# Patient Record
Sex: FEMALE | Race: WHITE | NOT HISPANIC OR LATINO | Employment: OTHER | URBAN - METROPOLITAN AREA
[De-identification: names, ages, dates, MRNs, and addresses within clinical notes are randomized per-mention and may not be internally consistent; named-entity substitution may affect disease eponyms.]

---
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
## 2022-06-16 ENCOUNTER — HOSPITAL ENCOUNTER (EMERGENCY)
Facility: HOSPITAL | Age: 22
Discharge: HOME | End: 2022-06-16
Attending: STUDENT IN AN ORGANIZED HEALTH CARE EDUCATION/TRAINING PROGRAM
Payer: MEDICAID

## 2022-06-16 VITALS
SYSTOLIC BLOOD PRESSURE: 141 MMHG | TEMPERATURE: 97 F | RESPIRATION RATE: 16 BRPM | DIASTOLIC BLOOD PRESSURE: 65 MMHG | HEART RATE: 82 BPM | OXYGEN SATURATION: 99 %

## 2022-06-16 DIAGNOSIS — R56.9 SEIZURE: ICD-10-CM

## 2022-06-16 DIAGNOSIS — Z34.90 PREGNANCY, UNSPECIFIED GESTATIONAL AGE: Primary | ICD-10-CM

## 2022-06-16 LAB
ALBUMIN SERPL-MCNC: 4.2 GM/DL (ref 3.5–5)
ALBUMIN/GLOB SERPL: 1.1 RATIO (ref 1.1–2)
ALP SERPL-CCNC: 103 UNIT/L (ref 40–150)
ALT SERPL-CCNC: 21 UNIT/L (ref 0–55)
AMPHET UR QL SCN: NEGATIVE
APPEARANCE UR: CLEAR
AST SERPL-CCNC: 20 UNIT/L (ref 5–34)
B-HCG SERPL QL: POSITIVE
BACTERIA #/AREA URNS AUTO: ABNORMAL /HPF
BARBITURATE SCN PRESENT UR: NEGATIVE
BASOPHILS # BLD AUTO: 0.08 X10(3)/MCL (ref 0–0.2)
BASOPHILS NFR BLD AUTO: 0.9 %
BENZODIAZ UR QL SCN: NEGATIVE
BILIRUB UR QL STRIP.AUTO: NEGATIVE MG/DL
BILIRUBIN DIRECT+TOT PNL SERPL-MCNC: 0.6 MG/DL
BUN SERPL-MCNC: 8 MG/DL (ref 7–18.7)
CALCIUM SERPL-MCNC: 9.5 MG/DL (ref 8.4–10.2)
CANNABINOIDS UR QL SCN: NEGATIVE
CHLORIDE SERPL-SCNC: 105 MMOL/L (ref 98–107)
CO2 SERPL-SCNC: 21 MMOL/L (ref 22–29)
COCAINE UR QL SCN: NEGATIVE
COLOR UR AUTO: YELLOW
CREAT SERPL-MCNC: 0.71 MG/DL (ref 0.55–1.02)
EOSINOPHIL # BLD AUTO: 0 X10(3)/MCL (ref 0–0.9)
EOSINOPHIL NFR BLD AUTO: 0 %
ERYTHROCYTE [DISTWIDTH] IN BLOOD BY AUTOMATED COUNT: 17.3 % (ref 11.5–17)
FENTANYL UR QL SCN: NEGATIVE
GLOBULIN SER-MCNC: 3.8 GM/DL (ref 2.4–3.5)
GLUCOSE SERPL-MCNC: 125 MG/DL (ref 74–100)
GLUCOSE UR QL STRIP.AUTO: NEGATIVE MG/DL
HCT VFR BLD AUTO: 31 % (ref 37–47)
HGB BLD-MCNC: 9.4 GM/DL (ref 12–16)
HYALINE CASTS URNS QL MICRO: ABNORMAL /HPF
IMM GRANULOCYTES # BLD AUTO: 0.01 X10(3)/MCL (ref 0–0.02)
IMM GRANULOCYTES NFR BLD AUTO: 0.1 % (ref 0–0.43)
KETONES UR QL STRIP.AUTO: ABNORMAL MG/DL
LEUKOCYTE ESTERASE UR QL STRIP.AUTO: NEGATIVE UNIT/L
LYMPHOCYTES # BLD AUTO: 2.67 X10(3)/MCL (ref 0.6–4.6)
LYMPHOCYTES NFR BLD AUTO: 29.9 %
MAGNESIUM SERPL-MCNC: 1.8 MG/DL (ref 1.6–2.6)
MCH RBC QN AUTO: 21.1 PG (ref 27–31)
MCHC RBC AUTO-ENTMCNC: 30.3 MG/DL (ref 33–36)
MCV RBC AUTO: 69.7 FL (ref 80–94)
MDMA UR QL SCN: NEGATIVE
MONOCYTES # BLD AUTO: 0.69 X10(3)/MCL (ref 0.1–1.3)
MONOCYTES NFR BLD AUTO: 7.7 %
MUCOUS THREADS URNS QL MICRO: ABNORMAL /LPF
NEUTROPHILS # BLD AUTO: 5.5 X10(3)/MCL (ref 2.1–9.2)
NEUTROPHILS NFR BLD AUTO: 61.4 %
NITRITE UR QL STRIP.AUTO: NEGATIVE
NRBC BLD AUTO-RTO: 0 %
OPIATES UR QL SCN: NEGATIVE
PCP UR QL: NEGATIVE
PH UR STRIP.AUTO: 6 [PH]
PH UR: 6 [PH] (ref 3–11)
PLATELET # BLD AUTO: 384 X10(3)/MCL (ref 130–400)
PMV BLD AUTO: 8.8 FL (ref 9.4–12.4)
POTASSIUM SERPL-SCNC: 3.5 MMOL/L (ref 3.5–5.1)
PROT SERPL-MCNC: 8 GM/DL (ref 6.4–8.3)
PROT UR QL STRIP.AUTO: ABNORMAL MG/DL
RBC # BLD AUTO: 4.45 X10(6)/MCL (ref 4.2–5.4)
RBC #/AREA URNS AUTO: ABNORMAL /HPF
RBC UR QL AUTO: NEGATIVE UNIT/L
SODIUM SERPL-SCNC: 139 MMOL/L (ref 136–145)
SP GR UR STRIP.AUTO: >=1.03
SPECIFIC GRAVITY, URINE AUTO (.000) (OHS): >=1.03 (ref 1–1.03)
SQUAMOUS #/AREA URNS AUTO: ABNORMAL /HPF
UROBILINOGEN UR STRIP-ACNC: 1 MG/DL
WBC # SPEC AUTO: 8.9 X10(3)/MCL (ref 4.5–11.5)
WBC #/AREA URNS AUTO: ABNORMAL /HPF

## 2022-06-16 PROCEDURE — 80053 COMPREHEN METABOLIC PANEL: CPT | Performed by: STUDENT IN AN ORGANIZED HEALTH CARE EDUCATION/TRAINING PROGRAM

## 2022-06-16 PROCEDURE — 93005 ELECTROCARDIOGRAM TRACING: CPT

## 2022-06-16 PROCEDURE — 99900031 HC PATIENT EDUCATION (STAT)

## 2022-06-16 PROCEDURE — 25000003 PHARM REV CODE 250: Performed by: STUDENT IN AN ORGANIZED HEALTH CARE EDUCATION/TRAINING PROGRAM

## 2022-06-16 PROCEDURE — 99284 EMERGENCY DEPT VISIT MOD MDM: CPT | Mod: 25

## 2022-06-16 PROCEDURE — 80307 DRUG TEST PRSMV CHEM ANLYZR: CPT | Performed by: STUDENT IN AN ORGANIZED HEALTH CARE EDUCATION/TRAINING PROGRAM

## 2022-06-16 PROCEDURE — 83735 ASSAY OF MAGNESIUM: CPT | Performed by: STUDENT IN AN ORGANIZED HEALTH CARE EDUCATION/TRAINING PROGRAM

## 2022-06-16 PROCEDURE — 81025 URINE PREGNANCY TEST: CPT | Performed by: STUDENT IN AN ORGANIZED HEALTH CARE EDUCATION/TRAINING PROGRAM

## 2022-06-16 PROCEDURE — 81001 URINALYSIS AUTO W/SCOPE: CPT | Mod: 59 | Performed by: STUDENT IN AN ORGANIZED HEALTH CARE EDUCATION/TRAINING PROGRAM

## 2022-06-16 PROCEDURE — 85025 COMPLETE CBC W/AUTO DIFF WBC: CPT | Performed by: STUDENT IN AN ORGANIZED HEALTH CARE EDUCATION/TRAINING PROGRAM

## 2022-06-16 PROCEDURE — 36415 COLL VENOUS BLD VENIPUNCTURE: CPT | Performed by: STUDENT IN AN ORGANIZED HEALTH CARE EDUCATION/TRAINING PROGRAM

## 2022-06-16 RX ORDER — CEPHALEXIN 500 MG/1
500 CAPSULE ORAL 2 TIMES DAILY
Qty: 14 CAPSULE | Refills: 0 | Status: SHIPPED | OUTPATIENT
Start: 2022-06-16 | End: 2022-06-23

## 2022-06-16 RX ORDER — CEPHALEXIN 500 MG/1
500 CAPSULE ORAL
Status: COMPLETED | OUTPATIENT
Start: 2022-06-16 | End: 2022-06-16

## 2022-06-16 RX ORDER — ALBUTEROL SULFATE 90 UG/1
1-2 AEROSOL, METERED RESPIRATORY (INHALATION) EVERY 6 HOURS PRN
Qty: 6.7 G | Refills: 3 | Status: SHIPPED | OUTPATIENT
Start: 2022-06-16 | End: 2024-03-11

## 2022-06-16 RX ADMIN — CEPHALEXIN 500 MG: 500 CAPSULE ORAL at 02:06

## 2022-06-16 NOTE — ED PROVIDER NOTES
Encounter Date: 6/16/2022       History     Chief Complaint   Patient presents with    Seizures     Reports of asthma attack followed by a seizure      22-year-old female who reports past medical history of seizures, DM, depression, anxiety, asthma presents to the emergency department the [facility] for evaluation after seizure.  EMS reports that they were recalled for an asthma attack that was followed by a seizure which lasted approximately 5 minutes and aborted without medications.  EMS arrived after 5 minutes and noted that the patient was alert and oriented with no respiratory distress.  Her vital signs are stable and her blood sugar was 134. Did not administer any medications.  Patient tells me that she has a period of memory loss, before that she was walking with her friends someplace but has difficulty remembering where.  She feels tired, has some pain of her left arm and left, has a headache described as pressure in, she has slurred speech, she had wheezing which has resolved.  She denies vision or hearing changes, sleep disturbance, urinary incontinence wears she tells me that she takes a pill in the morning and at night for seizures but does not know the name of the medication.  She thinks she had a seizure last line and tells me that she has seizures frequently.  She denies alcohol, drug use.  She smokes occasionally.  Denies medication allergies or previous surgeries.    The history is provided by the patient.     Review of patient's allergies indicates:  No Known Allergies  Past Medical History:   Diagnosis Date    Adjustment disorder     Anxiety     Bipolar disorder     Depression     History of psychiatric hospitalization     Hx of psychiatric care     Psychiatric exam requested by authority     Psychiatric problem     Sleep difficulties     Substance abuse     Suicide attempt      No past surgical history on file.  Family History   Problem Relation Age of Onset    Bipolar disorder Mother      Schizophrenia Mother     No Known Problems Father     Bipolar disorder Sister     Schizophrenia Sister     No Known Problems Brother     No Known Problems Maternal Aunt     No Known Problems Paternal Aunt     No Known Problems Maternal Uncle     No Known Problems Paternal Uncle     No Known Problems Maternal Grandfather     No Known Problems Maternal Grandmother     No Known Problems Paternal Grandfather     No Known Problems Paternal Grandmother     No Known Problems Cousin      Social History     Tobacco Use    Smoking status: Light Tobacco Smoker     Types: Vaping with nicotine, Cigarettes     Start date: 2/5/2020    Smokeless tobacco: Never Used   Substance Use Topics    Alcohol use: Yes     Comment: 1-2 beers very occasionally.    Drug use: Yes     Frequency: 21.0 times per week     Types: Marijuana     Review of Systems   Constitutional: Positive for fatigue.   HENT: Negative for hearing loss.    Eyes: Negative for visual disturbance.   Respiratory: Positive for wheezing.    Neurological: Positive for seizures and speech difficulty.   All other systems reviewed and are negative.      Physical Exam     Initial Vitals [06/16/22 0026]   BP Pulse Resp Temp SpO2   (!) 146/69 87 16 97.3 °F (36.3 °C) 100 %      MAP       --         Physical Exam    Constitutional: Vital signs are normal. She appears well-developed. She does not appear ill. No distress.   Somewhat tired-appearing female   HENT:   Head: Normocephalic and atraumatic.   Eyes: EOM are normal. Pupils are equal, round, and reactive to light.   Cardiovascular: Normal rate and regular rhythm.   Pulses:       Radial pulses are 2+ on the right side and 2+ on the left side.   Pulmonary/Chest: Breath sounds normal. No respiratory distress. She has no wheezes. She has no rhonchi. She has no rales.   Abdominal: Abdomen is soft. There is no abdominal tenderness.     Neurological: She is alert and oriented to person, place, and time. No cranial nerve  deficit or sensory deficit.   Equal strength of bilateral upper and lower extremities.  Subtle leftward nystagmus   Skin: Skin is warm and dry.         ED Course   Procedures  Labs Reviewed   COMPREHENSIVE METABOLIC PANEL - Abnormal; Notable for the following components:       Result Value    Carbon Dioxide 21 (*)     Glucose Level 125 (*)     Globulin 3.8 (*)     All other components within normal limits   URINALYSIS, REFLEX TO URINE CULTURE - Abnormal; Notable for the following components:    Protein, UA 2+ (*)     Ketones, UA 1+ (*)     All other components within normal limits   HCG QUALITATIVE URINE - Abnormal; Notable for the following components:    Beta hCG Qualitative, Urine Positive (*)     All other components within normal limits   CBC WITH DIFFERENTIAL - Abnormal; Notable for the following components:    Hgb 9.4 (*)     Hct 31.0 (*)     MCV 69.7 (*)     MCH 21.1 (*)     MCHC 30.3 (*)     RDW 17.3 (*)     MPV 8.8 (*)     All other components within normal limits   URINALYSIS, MICROSCOPIC - Abnormal; Notable for the following components:    Bacteria, UA Few (*)     Hyaline Casts, UA Rare (*)     Mucous, UA Many (*)     WBC, UA 11-20 (*)     Squamous Epithelial Cells, UA Many (*)     All other components within normal limits   MAGNESIUM - Normal   DRUG SCREEN, URINE (BEAKER) - Normal    Narrative:     Cut off concentrations:    Amphetamines - 1000 ng/ml  Barbiturates - 200 ng/ml  Benzodiazepine - 200 ng/ml  Cannabinoids (THC) - 50 ng/ml  Cocaine - 300 ng/ml  Fentanyl - 1.0 ng/ml  MDMA - 500 ng/ml  Opiates - 300 ng/ml   Phencyclidine (PCP) - 25 ng/ml    Specimen submitted for drug analysis and tested for pH and specific gravity in order to evaluate sample integrity. Suspect tampering if specific gravity is <1.003 and/or pH is not within the range of 4.5 - 8.0  False negatives may result form substances such as bleach added to urine.  False positives may result for the presence of a substance with similar  chemical structure to the drug or its metabolite.    This test provides only a PRELIMINARY analytical test result. A more specific alternate chemical method must be used in order to obtain a confirmed analytical result. Gas chromatography/mass spectrometry (GC/MS) is the preferred confirmatory method. Other chemical confirmation methods are available. Clinical consideration and professional judgement should be applied to any drug of abuse test result, particularly when preliminary positive results are used.    Positive results will be confirmed only at the physicians request. Unconfirmed screening results are to be used only for medical purposes (treatment).        CULTURE, URINE   CBC W/ AUTO DIFFERENTIAL    Narrative:     The following orders were created for panel order CBC auto differential.  Procedure                               Abnormality         Status                     ---------                               -----------         ------                     CBC with Differential[[id number]]        Abnormal            Final result                 Please view results for these tests on the individual orders.        ECG Results          EKG 12-lead (Preliminary result)  Result time 06/16/22 06:51:10    ED Interpretation by Porfirio oRsado MD (06/16/22 06:51:10, [facility] - Emergency Dept, Emergency Medicine)    Normal sinus rhythm with a rate of 83 beats per minute.  Parent will commence years duration, axis, QTC within normal limits.  No significant ST elevation or depression.  No hyperacute nor peaked T-waves.                  In process by Interface, Lab In [facility] (06/16/22 01:25:33)                 Narrative:    Test Reason : R56.9,    Vent. Rate : 083 BPM     Atrial Rate : 083 BPM     P-R Int : 128 ms          QRS Dur : 082 ms      QT Int : 364 ms       P-R-T Axes : 040 006 004 degrees     QTc Int : 427 ms    Normal sinus rhythm with sinus arrhythmia  Nonspecific ST abnormality  Abnormal  ECG  When compared with ECG of 24-AUG-2021 22:35,  Inverted T waves have replaced nonspecific T wave abnormality in Inferior  leads    Referred By: [email or web address]   SELF           Confirmed By:                             Imaging Results    None          Medications   cephALEXin capsule 500 mg (500 mg Oral Given 6/16/22 0232)     Medical Decision Making:   Initial Assessment:   22-year-old female who reports past medical history of seizures, DM, depression, anxiety, asthma presents to the emergency department the [facility] for evaluation after seizure.  Somewhat slow speech, no focal neurological deficits.  Differential Diagnosis:   Differential diagnosis includes was not limited to seizure, metabolic encephalopathy, intoxication  Clinical Tests:   Lab Tests: Ordered and Reviewed  Medical Tests: Ordered and Reviewed  ED Management:  Patient's mental status has improved significantly, her speech is much improved, her boyfriend at bedside reports that she is at her baseline.  Patient's workup revealed urinalysis suggestive of UTI although patient was asymptomatic she was found to be pregnant.  She took the news well despite not planning to become pregnant and was agreeable with plan to follow-up with an OBGYN.  She declined further workup and testing in the emergency department due to wanting private CNA accompanied by her boyfriend whom she noted was likely not the father.  Given the patient instructions regarding return precautions.             ED Course as of 06/16/22 0655   Thu Jun 16, 2022   0206 Preg Test, Ur(!): Positive [CW]   0207 WBC, UA(!): 11-20  Will cover for UTI in the setting of pregnancy [CW]   0222 Patient declines further workup of her pregnancy, stating that her boyfriend is with her is not the father. [CW]      ED Course User Index  [CW] Porfirio Rosado MD             Clinical Impression:   Final diagnoses:  [R56.9] Seizure  [Z34.90] Pregnancy, unspecified gestational age (Primary)          ED Disposition  Condition    Discharge Stable        ED Prescriptions     Medication Sig Dispense Start Date End Date Auth. Provider    cephALEXin (KEFLEX) 500 MG capsule Take 1 capsule (500 mg total) by mouth 2 (two) times a day. for 7 days 14 capsule 6/16/2022 6/23/2022 Porfirio Rosado MD    albuterol (PROVENTIL/VENTOLIN HFA) 90 mcg/actuation inhaler Inhale 1-2 puffs into the lungs every 6 (six) hours as needed for Wheezing. Rescue 6.7 g 6/16/2022 6/16/2023 Porfirio Rosado MD        Follow-up Information     Follow up With Specialties Details Why Contact Info    Kayla Blandon MD Obstetrics Call  For follow-up of today's complaints [address] [place] [address]  [phone]      [facility] - Emergency Dept Emergency Medicine Go to  If symptoms worsen [address] [facility] [id number] [phone]           Porfirio Rosado MD  06/16/22 0695

## 2022-06-16 NOTE — DISCHARGE INSTRUCTIONS
You were seen in the emergency department today for seizures, your lab revealed evidence of a urinary tract infection for which I am writing an antibiotic prescription.  Please call the number provided to follow-up.  Return to the emergency department for new or worsening symptoms.

## 2022-06-18 LAB — BACTERIA UR CULT: NO GROWTH

## 2022-07-20 ENCOUNTER — HOSPITAL ENCOUNTER (EMERGENCY)
Facility: HOSPITAL | Age: 22
Discharge: HOME | End: 2022-07-20
Attending: STUDENT IN AN ORGANIZED HEALTH CARE EDUCATION/TRAINING PROGRAM
Payer: MEDICAID

## 2022-07-20 VITALS
BODY MASS INDEX: 25.71 KG/M2 | HEART RATE: 87 BPM | WEIGHT: 160 LBS | DIASTOLIC BLOOD PRESSURE: 67 MMHG | HEIGHT: 66 IN | SYSTOLIC BLOOD PRESSURE: 132 MMHG | RESPIRATION RATE: 18 BRPM | TEMPERATURE: 98 F | OXYGEN SATURATION: 98 %

## 2022-07-20 DIAGNOSIS — Z34.90 PREGNANCY, UNSPECIFIED GESTATIONAL AGE: ICD-10-CM

## 2022-07-20 DIAGNOSIS — Z3A.20 20 WEEKS GESTATION OF PREGNANCY: Primary | ICD-10-CM

## 2022-07-20 DIAGNOSIS — O23.40 URINARY TRACT INFECTION AFFECTING PREGNANCY: ICD-10-CM

## 2022-07-20 DIAGNOSIS — Z55.6 DIFFICULTY DEMONSTRATING HEALTH LITERACY: ICD-10-CM

## 2022-07-20 LAB
ALBUMIN SERPL-MCNC: 4.3 GM/DL (ref 3.5–5)
ALBUMIN/GLOB SERPL: 1.2 RATIO (ref 1.1–2)
ALP SERPL-CCNC: 87 UNIT/L (ref 40–150)
ALT SERPL-CCNC: 19 UNIT/L (ref 0–55)
AMPHET UR QL SCN: NEGATIVE
APPEARANCE UR: ABNORMAL
AST SERPL-CCNC: 23 UNIT/L (ref 5–34)
BACTERIA #/AREA URNS AUTO: ABNORMAL /HPF
BARBITURATE SCN PRESENT UR: NEGATIVE
BASOPHILS # BLD AUTO: 0.06 X10(3)/MCL (ref 0–0.2)
BASOPHILS NFR BLD AUTO: 0.7 %
BENZODIAZ UR QL SCN: NEGATIVE
BILIRUB UR QL STRIP.AUTO: NEGATIVE MG/DL
BILIRUBIN DIRECT+TOT PNL SERPL-MCNC: 0.7 MG/DL
BUN SERPL-MCNC: 6 MG/DL (ref 7–18.7)
CALCIUM SERPL-MCNC: 9.7 MG/DL (ref 8.4–10.2)
CANNABINOIDS UR QL SCN: NEGATIVE
CHLORIDE SERPL-SCNC: 107 MMOL/L (ref 98–107)
CO2 SERPL-SCNC: 22 MMOL/L (ref 22–29)
COCAINE UR QL SCN: NEGATIVE
COLOR UR AUTO: YELLOW
CREAT SERPL-MCNC: 0.62 MG/DL (ref 0.55–1.02)
EOSINOPHIL # BLD AUTO: 0.14 X10(3)/MCL (ref 0–0.9)
EOSINOPHIL NFR BLD AUTO: 1.7 %
ERYTHROCYTE [DISTWIDTH] IN BLOOD BY AUTOMATED COUNT: 17.7 % (ref 11.5–17)
FENTANYL UR QL SCN: NEGATIVE
FLUAV AG UPPER RESP QL IA.RAPID: NOT DETECTED
FLUBV AG UPPER RESP QL IA.RAPID: NOT DETECTED
GLOBULIN SER-MCNC: 3.7 GM/DL (ref 2.4–3.5)
GLUCOSE SERPL-MCNC: 90 MG/DL (ref 74–100)
GLUCOSE UR QL STRIP.AUTO: NEGATIVE MG/DL
HCT VFR BLD AUTO: 30.6 % (ref 37–47)
HGB BLD-MCNC: 9.4 GM/DL (ref 12–16)
IMM GRANULOCYTES # BLD AUTO: 0.03 X10(3)/MCL (ref 0–0.04)
IMM GRANULOCYTES NFR BLD AUTO: 0.4 %
KETONES UR QL STRIP.AUTO: ABNORMAL MG/DL
LEUKOCYTE ESTERASE UR QL STRIP.AUTO: ABNORMAL UNIT/L
LYMPHOCYTES # BLD AUTO: 1.95 X10(3)/MCL (ref 0.6–4.6)
LYMPHOCYTES NFR BLD AUTO: 24.2 %
MCH RBC QN AUTO: 21.4 PG (ref 27–31)
MCHC RBC AUTO-ENTMCNC: 30.7 MG/DL (ref 33–36)
MCV RBC AUTO: 69.5 FL (ref 80–94)
MDMA UR QL SCN: NEGATIVE
MONOCYTES # BLD AUTO: 0.53 X10(3)/MCL (ref 0.1–1.3)
MONOCYTES NFR BLD AUTO: 6.6 %
NEUTROPHILS # BLD AUTO: 5.4 X10(3)/MCL (ref 2.1–9.2)
NEUTROPHILS NFR BLD AUTO: 66.4 %
NITRITE UR QL STRIP.AUTO: NEGATIVE
NRBC BLD AUTO-RTO: 0 %
OPIATES UR QL SCN: NEGATIVE
PCP UR QL: NEGATIVE
PH UR STRIP.AUTO: 5.5 [PH]
PH UR: 5.5 [PH] (ref 3–11)
PLATELET # BLD AUTO: 348 X10(3)/MCL (ref 130–400)
PMV BLD AUTO: 9.2 FL (ref 7.4–10.4)
POTASSIUM SERPL-SCNC: 3.5 MMOL/L (ref 3.5–5.1)
PROT SERPL-MCNC: 8 GM/DL (ref 6.4–8.3)
PROT UR QL STRIP.AUTO: ABNORMAL MG/DL
RBC # BLD AUTO: 4.4 X10(6)/MCL (ref 4.2–5.4)
RBC #/AREA URNS AUTO: ABNORMAL /HPF
RBC UR QL AUTO: NEGATIVE UNIT/L
SARS-COV-2 RNA RESP QL NAA+PROBE: NOT DETECTED
SODIUM SERPL-SCNC: 140 MMOL/L (ref 136–145)
SP GR UR STRIP.AUTO: >=1.03
SPECIFIC GRAVITY, URINE AUTO (.000) (OHS): >=1.03 (ref 1–1.03)
SQUAMOUS #/AREA URNS AUTO: ABNORMAL /HPF
UROBILINOGEN UR STRIP-ACNC: 1 MG/DL
WBC # SPEC AUTO: 8.1 X10(3)/MCL (ref 4.5–11.5)
WBC #/AREA URNS AUTO: ABNORMAL /HPF

## 2022-07-20 PROCEDURE — 63600175 PHARM REV CODE 636 W HCPCS: Performed by: STUDENT IN AN ORGANIZED HEALTH CARE EDUCATION/TRAINING PROGRAM

## 2022-07-20 PROCEDURE — 87636 SARSCOV2 & INF A&B AMP PRB: CPT | Performed by: STUDENT IN AN ORGANIZED HEALTH CARE EDUCATION/TRAINING PROGRAM

## 2022-07-20 PROCEDURE — 85025 COMPLETE CBC W/AUTO DIFF WBC: CPT | Performed by: STUDENT IN AN ORGANIZED HEALTH CARE EDUCATION/TRAINING PROGRAM

## 2022-07-20 PROCEDURE — 80307 DRUG TEST PRSMV CHEM ANLYZR: CPT | Performed by: STUDENT IN AN ORGANIZED HEALTH CARE EDUCATION/TRAINING PROGRAM

## 2022-07-20 PROCEDURE — 96360 HYDRATION IV INFUSION INIT: CPT

## 2022-07-20 PROCEDURE — 36415 COLL VENOUS BLD VENIPUNCTURE: CPT | Performed by: STUDENT IN AN ORGANIZED HEALTH CARE EDUCATION/TRAINING PROGRAM

## 2022-07-20 PROCEDURE — 99284 EMERGENCY DEPT VISIT MOD MDM: CPT | Mod: 25

## 2022-07-20 PROCEDURE — 81001 URINALYSIS AUTO W/SCOPE: CPT | Performed by: STUDENT IN AN ORGANIZED HEALTH CARE EDUCATION/TRAINING PROGRAM

## 2022-07-20 PROCEDURE — 80053 COMPREHEN METABOLIC PANEL: CPT | Performed by: STUDENT IN AN ORGANIZED HEALTH CARE EDUCATION/TRAINING PROGRAM

## 2022-07-20 RX ORDER — PNV NO.95/FERROUS FUM/FOLIC AC 28MG-0.8MG
1 TABLET ORAL DAILY
Qty: 30 TABLET | Refills: 10 | Status: SHIPPED | OUTPATIENT
Start: 2022-07-20 | End: 2022-07-20 | Stop reason: SDUPTHER

## 2022-07-20 RX ORDER — CEPHALEXIN 500 MG/1
500 CAPSULE ORAL 4 TIMES DAILY
Qty: 28 CAPSULE | Refills: 0 | Status: SHIPPED | OUTPATIENT
Start: 2022-07-20 | End: 2022-07-27

## 2022-07-20 RX ORDER — PNV NO.95/FERROUS FUM/FOLIC AC 28MG-0.8MG
1 TABLET ORAL DAILY
Qty: 30 TABLET | Refills: 10 | Status: SHIPPED | OUTPATIENT
Start: 2022-07-20

## 2022-07-20 RX ORDER — CEPHALEXIN 500 MG/1
500 CAPSULE ORAL 4 TIMES DAILY
Qty: 28 CAPSULE | Refills: 0 | Status: SHIPPED | OUTPATIENT
Start: 2022-07-20 | End: 2022-07-20 | Stop reason: SDUPTHER

## 2022-07-20 RX ADMIN — SODIUM CHLORIDE, POTASSIUM CHLORIDE, SODIUM LACTATE AND CALCIUM CHLORIDE 1000 ML: 600; 310; 30; 20 INJECTION, SOLUTION INTRAVENOUS at 03:07

## 2022-07-20 SDOH — SOCIAL DETERMINANTS OF HEALTH (SDOH): PROBLEMS RELATED TO HEALTH LITERACY: Z55.6

## 2022-07-20 NOTE — ED PROVIDER NOTES
Encounter Date: 7/20/2022       History     Chief Complaint   Patient presents with    Fatigue     20 weeks pregnant, fatigued, homeless     HPI     That this is a 22-year-old female at approximately 20 weeks gestational age presents emergency department for fatigue.  Patient had a positive pregnancy test on 06/16.  She has not followed up with OBGYN stating she cannot find any body take her insurance.  She also notes that her psychiatric doctor has stopped all her medications because they were not sure which was safe in pregnancy.  She is also not taking any of her seizure medication.  Denies having any recent seizures.  Patient states that she is relatively homeless at the time and has been spitting on time outside and feels like she might be dehydrated and she is fatigued.  She also notes she has intermittent headaches and took some medication from her friend which she thinks is Tylenol.    Review of patient's allergies indicates:  No Known Allergies  Past Medical History:   Diagnosis Date    Adjustment disorder     Anxiety     Bipolar disorder     Depression     History of psychiatric hospitalization     Hx of psychiatric care     Psychiatric exam requested by authority     Psychiatric problem     Sleep difficulties     Substance abuse     Suicide attempt      History reviewed. No pertinent surgical history.  Family History   Problem Relation Age of Onset    Bipolar disorder Mother     Schizophrenia Mother     No Known Problems Father     Bipolar disorder Sister     Schizophrenia Sister     No Known Problems Brother     No Known Problems Maternal Aunt     No Known Problems Paternal Aunt     No Known Problems Maternal Uncle     No Known Problems Paternal Uncle     No Known Problems Maternal Grandfather     No Known Problems Maternal Grandmother     No Known Problems Paternal Grandfather     No Known Problems Paternal Grandmother     No Known Problems Cousin      Social History      Tobacco Use    Smoking status: Light Tobacco Smoker     Types: Vaping with nicotine, Cigarettes     Start date: 2/5/2020    Smokeless tobacco: Never Used   Substance Use Topics    Alcohol use: Yes     Comment: 1-2 beers very occasionally.    Drug use: Yes     Frequency: 21.0 times per week     Types: Marijuana     Review of Systems   Constitutional: Positive for fatigue. Negative for fever.   Respiratory: Negative for cough and shortness of breath.    Cardiovascular: Negative for chest pain.   Gastrointestinal: Negative for abdominal pain, constipation, diarrhea, nausea and vomiting.   Genitourinary: Negative for decreased urine volume, difficulty urinating, dysuria, frequency, hematuria, pelvic pain, urgency, vaginal bleeding and vaginal discharge.   Neurological: Positive for headaches. Negative for dizziness.   All other systems reviewed and are negative.      Physical Exam     Initial Vitals [07/20/22 1430]   BP Pulse Resp Temp SpO2   (!) 148/86 81 18 98.4 °F (36.9 °C) 100 %      MAP       --         Physical Exam    Nursing note and vitals reviewed.  Constitutional: She appears well-developed and well-nourished. No distress.   Cardiovascular: Normal rate and regular rhythm.   Pulmonary/Chest: Breath sounds normal. No respiratory distress.   Abdominal: Abdomen is soft. Bowel sounds are normal. There is no abdominal tenderness.   Musculoskeletal:         General: No tenderness. Normal range of motion.     Neurological: She is alert and oriented to person, place, and time.   Skin: Skin is warm. Capillary refill takes less than 2 seconds.   Psychiatric: She has a normal mood and affect. Thought content normal.     Fetal heart tones 161    ED Course   Procedures  Labs Reviewed   COMPREHENSIVE METABOLIC PANEL - Abnormal; Notable for the following components:       Result Value    Blood Urea Nitrogen 6.0 (*)     Globulin 3.7 (*)     All other components within normal limits   URINALYSIS, REFLEX TO URINE  CULTURE - Abnormal; Notable for the following components:    Appearance, UA Cloudy (*)     Protein, UA 2+ (*)     Ketones, UA 2+ (*)     Leukocyte Esterase, UA 2+ (*)     All other components within normal limits   CBC WITH DIFFERENTIAL - Abnormal; Notable for the following components:    Hgb 9.4 (*)     Hct 30.6 (*)     MCV 69.5 (*)     MCH 21.4 (*)     MCHC 30.7 (*)     RDW 17.7 (*)     All other components within normal limits   URINALYSIS, MICROSCOPIC - Abnormal; Notable for the following components:    Bacteria, UA Many (*)     WBC, UA 51-99 (*)     Squamous Epithelial Cells, UA Many (*)     All other components within normal limits   DRUG SCREEN, URINE (BEAKER) - Normal    Narrative:     Cut off concentrations:    Amphetamines - 1000 ng/ml  Barbiturates - 200 ng/ml  Benzodiazepine - 200 ng/ml  Cannabinoids (THC) - 50 ng/ml  Cocaine - 300 ng/ml  Fentanyl - 1.0 ng/ml  MDMA - 500 ng/ml  Opiates - 300 ng/ml   Phencyclidine (PCP) - 25 ng/ml    Specimen submitted for drug analysis and tested for pH and specific gravity in order to evaluate sample integrity. Suspect tampering if specific gravity is <1.003 and/or pH is not within the range of 4.5 - 8.0  False negatives may result form substances such as bleach added to urine.  False positives may result for the presence of a substance with similar chemical structure to the drug or its metabolite.    This test provides only a PRELIMINARY analytical test result. A more specific alternate chemical method must be used in order to obtain a confirmed analytical result. Gas chromatography/mass spectrometry (GC/MS) is the preferred confirmatory method. Other chemical confirmation methods are available. Clinical consideration and professional judgement should be applied to any drug of abuse test result, particularly when preliminary positive results are used.    Positive results will be confirmed only at the physicians request. Unconfirmed screening results are to be used only  for medical purposes (treatment).        COVID/FLU A&B PCR - Normal   CULTURE, URINE   CBC W/ AUTO DIFFERENTIAL    Narrative:     The following orders were created for panel order CBC auto differential.  Procedure                               Abnormality         Status                     ---------                               -----------         ------                     CBC with Differential[[id number]]        Abnormal            Final result                 Please view results for these tests on the individual orders.          Imaging Results    None          Medications   lactated ringers bolus 1,000 mL (1,000 mLs Intravenous New Bag 7/20/22 1523)     Medical Decision Making:   Differential Diagnosis:   Dehydration, UTI, viral syndrome             ED Course as of 07/20/22 1619   Wed Jul 20, 2022   1601 Spoke with [facility] to obtain a follow-up.  Referral sent a message placed in the computer system.  Patient states she will be able to have a ride to [place] [place] for appointment. [BS]   1603 Protein, UA(!): 2+  Patient has chronic 2+ protein in her urine.  Blood pressure has now normalized. [BS]   1612 Spoke with patient's dad who states that she will move back in with him and that she left because he did not allow her boyfriend to be there.  He states that he will better bring her to an appointment and get her medications [BS]      ED Course User Index  [BS] Richard Crowe MD             Clinical Impression:   Final diagnoses:  [Z3A.20] 20 weeks gestation of pregnancy (Primary)  [Z34.90] Pregnancy, unspecified gestational age  [O23.40] Urinary tract infection affecting pregnancy  [Z78.9] Difficulty demonstrating health literacy          ED Disposition Condition    Discharge Stable        ED Prescriptions     Medication Sig Dispense Start Date End Date Auth. Provider    cephALEXin (KEFLEX) 500 MG capsule  (Status: Discontinued) Take 1 capsule (500 mg total) by mouth 4 (four) times daily. for 7 days 28  capsule 7/20/2022 7/20/2022 Richard Crowe MD    prenatal vit no.124-iron-folic (PRENATAL VITAMIN) 27 mg iron- 800 mcg Tab  (Status: Discontinued) Take 1 tablet by mouth once daily at 6am. 30 tablet 7/20/2022 7/20/2022 Richard Crowe MD    prenatal vit no.124-iron-folic (PRENATAL VITAMIN) 27 mg iron- 800 mcg Tab Take 1 tablet by mouth once daily at 6am. 30 tablet 7/20/2022  Richard Crowe MD    cephALEXin (KEFLEX) 500 MG capsule Take 1 capsule (500 mg total) by mouth 4 (four) times daily. for 7 days 28 capsule 7/20/2022 7/27/2022 Richard Crowe MD        Follow-up Information     Follow up With Specialties Details Why Contact Info    [facility] - Emergency Dept Emergency Medicine Go to  If symptoms worsen [address] [facility] [id number] [phone]    Follow-up with OB care               Richard Crowe MD  07/20/22 0723

## 2022-07-20 NOTE — ED NOTES
Pt per sintia with headache and weakness, reports 2 months pregnant.  States she's been living outside, sleeping outside near the [employer].  States her dad lives in town and will come to pick her up after this er visit.

## 2022-07-22 LAB — BACTERIA UR CULT: NORMAL

## 2023-04-11 DIAGNOSIS — R56.9 SEIZURES: Primary | ICD-10-CM

## 2023-06-22 DIAGNOSIS — O24.111 TYPE 2 DIABETES MELLITUS AFFECTING PREGNANCY IN FIRST TRIMESTER, ANTEPARTUM: Primary | ICD-10-CM

## 2023-07-13 ENCOUNTER — PROCEDURE VISIT (OUTPATIENT)
Dept: MATERNAL FETAL MEDICINE | Facility: CLINIC | Age: 23
End: 2023-07-13
Payer: MEDICAID

## 2023-07-13 DIAGNOSIS — O24.111 TYPE 2 DIABETES MELLITUS AFFECTING PREGNANCY IN FIRST TRIMESTER, ANTEPARTUM: ICD-10-CM

## 2023-07-13 PROBLEM — O03.9 SPONTANEOUS ABORTION: Status: ACTIVE | Noted: 2023-07-13

## 2023-07-13 PROBLEM — E11.9 DIABETES MELLITUS: Status: ACTIVE | Noted: 2023-07-13

## 2023-10-19 ENCOUNTER — LAB VISIT (OUTPATIENT)
Dept: LAB | Facility: HOSPITAL | Age: 23
End: 2023-10-19
Attending: STUDENT IN AN ORGANIZED HEALTH CARE EDUCATION/TRAINING PROGRAM
Payer: MEDICAID

## 2023-10-19 DIAGNOSIS — O24.419 GDM (GESTATIONAL DIABETES MELLITUS): ICD-10-CM

## 2023-10-19 DIAGNOSIS — O13.2 GESTATIONAL HYPERTENSION WITHOUT SIGNIFICANT PROTEINURIA IN SECOND TRIMESTER: Primary | ICD-10-CM

## 2023-10-19 PROBLEM — O03.9 SPONTANEOUS ABORTION: Status: RESOLVED | Noted: 2023-07-13 | Resolved: 2023-10-19

## 2023-10-19 PROBLEM — F32.A DEPRESSION: Status: RESOLVED | Noted: 2021-08-25 | Resolved: 2023-10-19

## 2023-10-19 PROBLEM — O09.92 HIGH-RISK PREGNANCY IN SECOND TRIMESTER: Status: ACTIVE | Noted: 2023-10-19

## 2023-10-19 PROBLEM — G40.909 SEIZURE DISORDER: Status: ACTIVE | Noted: 2023-10-19

## 2023-10-19 PROBLEM — E11.9 DIABETES MELLITUS: Status: RESOLVED | Noted: 2023-07-13 | Resolved: 2023-10-19

## 2023-10-19 PROBLEM — O09.299 H/O SHOULDER DYSTOCIA IN PRIOR PREGNANCY, CURRENTLY PREGNANT: Status: ACTIVE | Noted: 2023-10-19

## 2023-10-19 PROBLEM — U07.1 COVID-19: Status: RESOLVED | Noted: 2021-08-29 | Resolved: 2023-10-19

## 2023-10-19 LAB
ALBUMIN SERPL-MCNC: 3.7 G/DL (ref 3.5–5)
ALBUMIN/GLOB SERPL: 0.9 RATIO (ref 1.1–2)
ALP SERPL-CCNC: 145 UNIT/L
ALT SERPL-CCNC: 30 UNIT/L (ref 0–55)
AST SERPL-CCNC: 46 UNIT/L (ref 5–34)
BILIRUB SERPL-MCNC: 0.5 MG/DL
BUN SERPL-MCNC: 4.1 MG/DL
CALCIUM SERPL-MCNC: 9.5 MG/DL
CHLORIDE SERPL-SCNC: 104 MMOL/L (ref 98–107)
CO2 SERPL-SCNC: 23 MMOL/L (ref 22–29)
CREAT SERPL-MCNC: 0.55 MG/DL
ERYTHROCYTE [DISTWIDTH] IN BLOOD BY AUTOMATED COUNT: 18.5 % (ref 11.5–17)
EST. AVERAGE GLUCOSE BLD GHB EST-MCNC: 111.2 MG/DL
GFR SERPLBLD CREATININE-BSD FMLA CKD-EPI: >60 MLS/MIN/1.73/M2
GLOBULIN SER-MCNC: 4.1 GM/DL (ref 2.4–3.5)
GLUCOSE SERPL-MCNC: 103 MG/DL (ref 74–100)
HBA1C MFR BLD: 5.5 %
HCT VFR BLD AUTO: 30.4 % (ref 42–52)
HGB BLD-MCNC: 9.2 G/DL (ref 14–18)
LDH SERPL-CCNC: 201 U/L (ref 125–220)
MCH RBC QN AUTO: 21.6 PG (ref 27–31)
MCHC RBC AUTO-ENTMCNC: 30.3 G/DL (ref 33–36)
MCV RBC AUTO: 71.4 FL (ref 80–94)
NRBC BLD AUTO-RTO: 0 %
PLATELET # BLD AUTO: 318 X10(3)/MCL (ref 130–400)
PMV BLD AUTO: 9.3 FL (ref 7.4–10.4)
POTASSIUM SERPL-SCNC: 3.8 MMOL/L (ref 3.5–5.1)
PROT SERPL-MCNC: 7.8 GM/DL (ref 6.4–8.3)
RBC # BLD AUTO: 4.26 X10(6)/MCL
SODIUM SERPL-SCNC: 137 MMOL/L (ref 136–145)
TSH SERPL-ACNC: 1.36 UIU/ML (ref 0.35–4.94)
URATE SERPL-MCNC: 5.2 MG/DL
WBC # SPEC AUTO: 5.71 X10(3)/MCL (ref 4.5–11.5)

## 2023-10-19 PROCEDURE — 80053 COMPREHEN METABOLIC PANEL: CPT

## 2023-10-19 PROCEDURE — 84443 ASSAY THYROID STIM HORMONE: CPT

## 2023-10-19 PROCEDURE — 93010 ELECTROCARDIOGRAM REPORT: CPT | Mod: ,,, | Performed by: INTERNAL MEDICINE

## 2023-10-19 PROCEDURE — 85027 COMPLETE CBC AUTOMATED: CPT

## 2023-10-19 PROCEDURE — 84550 ASSAY OF BLOOD/URIC ACID: CPT

## 2023-10-19 PROCEDURE — 83615 LACTATE (LD) (LDH) ENZYME: CPT

## 2023-10-19 PROCEDURE — 93005 ELECTROCARDIOGRAM TRACING: CPT

## 2023-10-19 PROCEDURE — 93010 EKG 12-LEAD: ICD-10-PCS | Mod: ,,, | Performed by: INTERNAL MEDICINE

## 2023-10-19 PROCEDURE — 83036 HEMOGLOBIN GLYCOSYLATED A1C: CPT

## 2023-10-19 PROCEDURE — 36415 COLL VENOUS BLD VENIPUNCTURE: CPT

## 2023-10-31 DIAGNOSIS — O24.111 TYPE 2 DIABETES MELLITUS AFFECTING PREGNANCY IN FIRST TRIMESTER, ANTEPARTUM: Primary | ICD-10-CM

## 2023-11-01 ENCOUNTER — OFFICE VISIT (OUTPATIENT)
Dept: MATERNAL FETAL MEDICINE | Facility: CLINIC | Age: 23
End: 2023-11-01
Payer: MEDICAID

## 2023-11-01 ENCOUNTER — PROCEDURE VISIT (OUTPATIENT)
Dept: MATERNAL FETAL MEDICINE | Facility: CLINIC | Age: 23
End: 2023-11-01
Payer: MEDICAID

## 2023-11-01 VITALS
DIASTOLIC BLOOD PRESSURE: 64 MMHG | SYSTOLIC BLOOD PRESSURE: 106 MMHG | HEART RATE: 96 BPM | WEIGHT: 195 LBS | HEIGHT: 66 IN | BODY MASS INDEX: 31.34 KG/M2

## 2023-11-01 DIAGNOSIS — O99.352 SEIZURE DISORDER DURING PREGNANCY IN SECOND TRIMESTER: ICD-10-CM

## 2023-11-01 DIAGNOSIS — O99.012 ANEMIA DURING PREGNANCY IN SECOND TRIMESTER: ICD-10-CM

## 2023-11-01 DIAGNOSIS — O09.299 H/O SHOULDER DYSTOCIA IN PRIOR PREGNANCY, CURRENTLY PREGNANT: Primary | ICD-10-CM

## 2023-11-01 DIAGNOSIS — G40.909 SEIZURE DISORDER DURING PREGNANCY IN SECOND TRIMESTER: ICD-10-CM

## 2023-11-01 DIAGNOSIS — O24.111 TYPE 2 DIABETES MELLITUS AFFECTING PREGNANCY IN FIRST TRIMESTER, ANTEPARTUM: ICD-10-CM

## 2023-11-01 DIAGNOSIS — O24.112 PRE-EXISTING TYPE 2 DIABETES MELLITUS DURING PREGNANCY IN SECOND TRIMESTER: Primary | ICD-10-CM

## 2023-11-01 DIAGNOSIS — O09.292 HX OF PREECLAMPSIA, PRIOR PREGNANCY, CURRENTLY PREGNANT, SECOND TRIMESTER: ICD-10-CM

## 2023-11-01 DIAGNOSIS — O24.112 PRE-EXISTING TYPE 2 DIABETES MELLITUS DURING PREGNANCY IN SECOND TRIMESTER: ICD-10-CM

## 2023-11-01 DIAGNOSIS — O09.90 AT HIGH RISK FOR COMPLICATIONS OF INTRAUTERINE PREGNANCY (IUP): ICD-10-CM

## 2023-11-01 DIAGNOSIS — O99.212 OBESITY AFFECTING PREGNANCY IN SECOND TRIMESTER, UNSPECIFIED OBESITY TYPE: ICD-10-CM

## 2023-11-01 PROBLEM — F31.9 BIPOLAR DISEASE DURING PREGNANCY IN SECOND TRIMESTER: Status: ACTIVE | Noted: 2021-08-25

## 2023-11-01 PROBLEM — O99.342 DEPRESSION DURING PREGNANCY IN SECOND TRIMESTER: Status: ACTIVE | Noted: 2021-08-25

## 2023-11-01 PROCEDURE — 3078F DIAST BP <80 MM HG: CPT | Mod: CPTII,S$GLB,, | Performed by: OBSTETRICS & GYNECOLOGY

## 2023-11-01 PROCEDURE — 99204 OFFICE O/P NEW MOD 45 MIN: CPT | Mod: TH,S$GLB,, | Performed by: OBSTETRICS & GYNECOLOGY

## 2023-11-01 PROCEDURE — 3008F PR BODY MASS INDEX (BMI) DOCUMENTED: ICD-10-PCS | Mod: CPTII,S$GLB,, | Performed by: OBSTETRICS & GYNECOLOGY

## 2023-11-01 PROCEDURE — 3044F HG A1C LEVEL LT 7.0%: CPT | Mod: CPTII,S$GLB,, | Performed by: OBSTETRICS & GYNECOLOGY

## 2023-11-01 PROCEDURE — 76811 OB US DETAILED SNGL FETUS: CPT | Mod: S$GLB,,, | Performed by: OBSTETRICS & GYNECOLOGY

## 2023-11-01 PROCEDURE — 99204 PR OFFICE/OUTPT VISIT, NEW, LEVL IV, 45-59 MIN: ICD-10-PCS | Mod: TH,S$GLB,, | Performed by: OBSTETRICS & GYNECOLOGY

## 2023-11-01 PROCEDURE — 3074F SYST BP LT 130 MM HG: CPT | Mod: CPTII,S$GLB,, | Performed by: OBSTETRICS & GYNECOLOGY

## 2023-11-01 PROCEDURE — 1159F MED LIST DOCD IN RCRD: CPT | Mod: CPTII,S$GLB,, | Performed by: OBSTETRICS & GYNECOLOGY

## 2023-11-01 PROCEDURE — 3074F PR MOST RECENT SYSTOLIC BLOOD PRESSURE < 130 MM HG: ICD-10-PCS | Mod: CPTII,S$GLB,, | Performed by: OBSTETRICS & GYNECOLOGY

## 2023-11-01 PROCEDURE — 76811 PR US, OB FETAL EVAL & EXAM, TRANSABDOM,FIRST GESTATION: ICD-10-PCS | Mod: S$GLB,,, | Performed by: OBSTETRICS & GYNECOLOGY

## 2023-11-01 PROCEDURE — 3008F BODY MASS INDEX DOCD: CPT | Mod: CPTII,S$GLB,, | Performed by: OBSTETRICS & GYNECOLOGY

## 2023-11-01 PROCEDURE — 3078F PR MOST RECENT DIASTOLIC BLOOD PRESSURE < 80 MM HG: ICD-10-PCS | Mod: CPTII,S$GLB,, | Performed by: OBSTETRICS & GYNECOLOGY

## 2023-11-01 PROCEDURE — 1159F PR MEDICATION LIST DOCUMENTED IN MEDICAL RECORD: ICD-10-PCS | Mod: CPTII,S$GLB,, | Performed by: OBSTETRICS & GYNECOLOGY

## 2023-11-01 PROCEDURE — 3044F PR MOST RECENT HEMOGLOBIN A1C LEVEL <7.0%: ICD-10-PCS | Mod: CPTII,S$GLB,, | Performed by: OBSTETRICS & GYNECOLOGY

## 2023-11-01 RX ORDER — BLOOD-GLUCOSE METER
EACH MISCELLANEOUS
COMMUNITY
Start: 2023-10-19

## 2023-11-01 RX ORDER — VITAMIN E 268 MG
250 CAPSULE ORAL 2 TIMES DAILY
Qty: 60 EACH | Refills: 3 | Status: SHIPPED | OUTPATIENT
Start: 2023-11-01 | End: 2023-12-05 | Stop reason: SDUPTHER

## 2023-11-01 RX ORDER — NAPROXEN SODIUM 220 MG
1 TABLET ORAL 2 TIMES DAILY
Qty: 60 EACH | Refills: 3 | Status: SHIPPED | OUTPATIENT
Start: 2023-11-01 | End: 2023-12-05 | Stop reason: SDUPTHER

## 2023-11-01 RX ORDER — INSULIN HUMAN 100 [IU]/ML
INJECTION, SUSPENSION SUBCUTANEOUS
Qty: 10 ML | Refills: 3 | Status: SHIPPED | OUTPATIENT
Start: 2023-11-01 | End: 2023-12-06 | Stop reason: SDUPTHER

## 2023-11-01 RX ORDER — ASCORBIC ACID, CHOLECALCIFEROL, DL-.ALPHA.-TOCOPHEROL ACETATE, THIAMINE HYDROCHLORIDE, RIBOFLAVIN, NIACINAMIDE, PYRIDOXINE HYDROCHLORIDE, FOLIC ACID, CYANOCOBALAMIN, CALCIUM CARBONATE, FERROUS FUMARATE, ZINC OXIDE, CUPRIC SULFATE 100; 400; 30; 1.6; 1.6; 20; 3.1; 1; 12; 200; 27; 10; 2 MG/1; [IU]/1; [IU]/1; MG/1; MG/1; MG/1; MG/1; MG/1; UG/1; MG/1; MG/1; MG/1; MG/1
1 TABLET, COATED ORAL
COMMUNITY
Start: 2023-10-19 | End: 2023-11-29 | Stop reason: SDUPTHER

## 2023-11-01 RX ORDER — LANCETS 33 GAUGE
EACH MISCELLANEOUS
COMMUNITY
Start: 2023-10-19

## 2023-11-01 NOTE — PROGRESS NOTES
Maternal Fetal Medicine New Consult    Subjective     Patient ID: [id number]    Chief Complaint: MFM CONSULT W/US (T2DM, SEIZURE DISORDER)      HPI 23 y.o.  adult  at 18w4d gestation with Estimated Date of Delivery: 3/30/24 by early US, not consistent with LMP. She is sent for MFM consultation for T2DM, seizure disorder.  She has type 2 diabetes, diagnosed as a child.  She was on metformin previously but discontinued it a few months ago due to feeling sick with it.  On 10/19/2023, hemoglobin A1c was 5.5.  TSH was 1.36.  She is currently checking her sugars and reports fastings in the 100s and 1 hour postprandial in the 100s.  She has history of shoulder dystocia in her last pregnancy.  Baby weighed 8 lb 4 oz and she reports had some nerve damage to 1 arm and does physical therapy.  She had preeclampsia in her last pregnancy.  She is taking low-dose aspirin daily.  She has history of seizure disorder, diagnosed in childhood.  She is currently on Keppra 500 mg twice daily after report of recent seizure earlier in October.  She denies any personal or family history of aneuploidy or anomalies.  She reports she has an appointment with Neurology in December with Dr. High.  She has increased BMI of 31 at consult visit.  She is on low-dose aspirin daily.  She has history of bipolar disorder and depression and is on Lexapro 10 mg daily.  She has history of anemia.  She is currently on prenatal and folic acid.  On 10/19/2023, H&H 9.2/30.4.  She denies any exposure to high fevers, viral rashes, illicit drugs or alcohol in this pregnancy.  She denies any leaking fluid, vaginal bleeding, contractions, decreased fetal movement. Denies headaches, visual disturbances, or epigastric pain.    Pregnancy complications include:   Patient Active Problem List   Diagnosis    Bipolar disease during pregnancy in second trimester    Pre-existing type 2 diabetes mellitus during pregnancy in second trimester    Seizure disorder during  pregnancy in second trimester    High-risk pregnancy in second trimester    H/O shoulder dystocia in prior pregnancy, currently pregnant    Hx of preeclampsia, prior pregnancy, currently pregnant, second trimester    At high risk for complications of intrauterine pregnancy (IUP)    Increased BMI affecting pregnancy in second trimester    Anemia during pregnancy in second trimester        Past Medical History:   Diagnosis Date    Adjustment disorder 2008    Anemia 2023    BEGAN WITH PREGNANCY OF LAST BABY, CURRENT    Anxiety 2008    Asthma 2002    CURRENT    Bipolar disorder 2008    Depression 2008    Diabetes mellitus 07/13/2023    TYPE 2 DIABETIC    History of psychiatric hospitalization 2008    Hx of psychiatric care 2008    Hypertension 2023    ECLAMPSIA WITH LABOR    Psychiatric exam requested by authority 2008    Psychiatric problem 2008    Seizure disorder 2002    DIAGNOSED WHEN BORN    Sleep difficulties     Substance abuse     BEEN SOBER FOR 2 YEARS IN FEBRUARY    Suicide attempt     HAPPENS OFTEN, LAST ATTEMPT WAS 2021       Past Surgical History:   Procedure Laterality Date    NO PAST SURGERIES         Family History   Problem Relation Age of Onset    Bipolar disorder Mother     Schizophrenia Mother     No Known Problems Father     Bipolar disorder Sister     Schizophrenia Sister     No Known Problems Brother     No Known Problems Maternal Aunt     No Known Problems Paternal Aunt     No Known Problems Maternal Uncle     No Known Problems Paternal Uncle     No Known Problems Maternal Grandfather     No Known Problems Maternal Grandmother     No Known Problems Paternal Grandfather     No Known Problems Paternal Grandmother     No Known Problems Cousin        Social History     Socioeconomic History    Marital status:     Number of children: 0   Occupational History    Occupation: unemployed   Tobacco Use    Smoking status: Former     Types: Vaping with nicotine, Cigarettes     Start date: 2/5/2020      Quit date:      Years since quittin.8     Passive exposure: Never    Smokeless tobacco: Never   Substance and Sexual Activity    Alcohol use: Not Currently    Drug use: Not Currently     Frequency: 21.0 times per week     Types: Marijuana, Heroin    Sexual activity: Yes     Partners: Male   Other Topics Concern    Patient feels they ought to cut down on drinking/drug use No    Patient annoyed by others criticizing their drinking/drug use No    Patient has felt bad or guilty about drinking/drug use No    Patient has had a drink/used drugs as an eye opener in the AM No   Social History Narrative    ** Merged History Encounter **         Pt is a 21 year old female who recently broke up with her  Boyfriend, whom she had been living with.   Pt reports that she does not attend school nor does she have a job. Pt states that she completed 11 th grade.       Current Outpatient Medications   Medication Sig Dispense Refill    aspirin (ECOTRIN) 81 MG EC tablet Take 2 tablets (162 mg total) by mouth once daily. Start at 12 weeks gestation.  At 36 weeks gestation, decrease to one 81mg tablet daily. 60 tablet 6    blood sugar diagnostic (TRUE METRIX GLUCOSE TEST STRIP) Strp USE TO TEST BLOOD GLUCOSE AS DIRECTED 6-8 X/DAY      blood sugar diagnostic Strp To check BG 4 times daily, to use with insurance preferred meter 50 each 11    blood-glucose meter kit To check BG 4 times daily, to use with insurance preferred meter 1 each 0    EScitalopram oxalate (LEXAPRO) 10 MG tablet Take 1 tablet (10 mg total) by mouth once daily. 30 tablet 1    folic acid (FOLVITE) 1 MG tablet Take 1 tablet (1 mg total) by mouth once daily. 30 tablet 11    lancets Misc To check BG 4 times daily, to use with insurance preferred meter 50 each 11    levETIRAcetam (KEPPRA) 500 MG Tab Take 1 tablet (500 mg total) by mouth 2 (two) times daily. 60 tablet 11    ondansetron (ZOFRAN-ODT) 4 MG TbDL Take 1 tablet (4 mg total) by mouth every 6 (six) hours as  needed (nausea). 40 tablet 1    prenatal vit no.124-iron-folic (PRENATAL VITAMIN) 27 mg iron- 800 mcg Tab Take 1 tablet by mouth once daily at 6am. 30 tablet 10    TRUE METRIX GLUCOSE METER Misc USE TO CHECK BLOOD GLUCOSE FOUR TIMES DAILY.      TRUEPLUS LANCETS 33 gauge Misc USE TO CHECK BLOOD GLUCOSE FOUR TIMES DAILY.      albuterol (PROVENTIL/VENTOLIN HFA) 90 mcg/actuation inhaler Inhale 1-2 puffs into the lungs every 6 (six) hours as needed for Wheezing. Rescue 6.7 g 3    ascorbic acid, vitamin C, (VITAMIN C) 500 mg TbSR Take 250 mg by mouth 2 (two) times a day. 60 each 3    mirtazapine (REMERON) 30 MG tablet Take 1 tablet (30 mg total) by mouth every evening. 30 tablet 1     27 mg iron- 1 mg Tab Take 1 tablet by mouth.       No current facility-administered medications for this visit.       Review of patient's allergies indicates:  No Active Allergies      Medications:  Current Outpatient Medications   Medication Instructions    albuterol (PROVENTIL/VENTOLIN HFA) 90 mcg/actuation inhaler 1-2 puffs, Inhalation, Every 6 hours PRN, Rescue    ascorbic acid (vitamin C) (VITAMIN C) 250 mg, Oral, 2 times daily    aspirin (ECOTRIN) 162 mg, Oral, Daily, Start at 12 weeks gestation.  At 36 weeks gestation, decrease to one 81mg tablet daily.    blood sugar diagnostic (TRUE METRIX GLUCOSE TEST STRIP) Strp USE TO TEST BLOOD GLUCOSE AS DIRECTED 6-8 X/DAY    blood sugar diagnostic Strp To check BG 4 times daily, to use with insurance preferred meter    blood-glucose meter kit To check BG 4 times daily, to use with insurance preferred meter    EScitalopram oxalate (LEXAPRO) 10 mg, Oral, Daily    folic acid (FOLVITE) 1 mg, Oral, Daily    lancets Misc To check BG 4 times daily, to use with insurance preferred meter    levETIRAcetam (KEPPRA) 500 mg, Oral, 2 times daily    mirtazapine (REMERON) 30 mg, Oral, Nightly    ondansetron (ZOFRAN-ODT) 4 mg, Oral, Every 6 hours PRN    prenatal vit no.124-iron-folic (PRENATAL VITAMIN)  "27 mg iron- 800 mcg Tab 1 tablet, Oral, Daily     27 mg iron- 1 mg Tab 1 tablet, Oral    TRUE METRIX GLUCOSE METER Misc USE TO CHECK BLOOD GLUCOSE FOUR TIMES DAILY.    TRUEPLUS LANCETS 33 gauge Misc USE TO CHECK BLOOD GLUCOSE FOUR TIMES DAILY.       Review of Systems   12 point review of systems conducted, negative except as stated in the history of present illness. See HPI for details.      Objective     Visit Vitals  /64 (BP Location: Left arm, Patient Position: Sitting, BP Method: Large (Automatic))   Pulse 96   Ht 5' 6" (1.676 m)   Wt 88.5 kg (195 lb)   LMP 07/01/2023 (Exact Date)   BMI 31.47 kg/m²        Physical Exam  Vitals and nursing note reviewed.   Constitutional:       General: Arlen Denise is not in acute distress.     Appearance: Normal appearance.      Comments: Increased BMI   HENT:      Head: Normocephalic and atraumatic.      Nose: Nose normal. No congestion.      Mouth/Throat:      Pharynx: Oropharynx is clear.   Eyes:      General: No scleral icterus.     Pupils: Pupils are equal, round, and reactive to light.   Cardiovascular:      Rate and Rhythm: Normal rate and regular rhythm.   Pulmonary:      Effort: No respiratory distress.      Breath sounds: Normal breath sounds. No wheezing.   Abdominal:      General: Abdomen is flat.      Palpations: Abdomen is soft.      Tenderness: There is no abdominal tenderness. There is no right CVA tenderness, left CVA tenderness or guarding.      Comments: No CVA tenderness gravid uterus.    Musculoskeletal:         General: Normal range of motion.      Cervical back: Neck supple.      Right lower leg: No edema.      Left lower leg: No edema.   Skin:     General: Skin is warm.      Findings: No bruising or rash.   Neurological:      General: No focal deficit present.      Mental Status: Arlen Denise is oriented to person, place, and time.      Deep Tendon Reflexes: Reflexes normal.      Comments: Normal reflexes   Psychiatric:         Mood and " Affect: Mood normal.         Behavior: Behavior normal.         Thought Content: Thought content normal.         Judgment: Judgment normal.         ASSESSMENT/PLAN:     23 y.o.  female with IUP at 18w4d    Type 2 diabetes mellitus in pregnancy  I discussed with her risks associated with diabetes in pregnancy including higher risk for polyhydramnios, fetal macrosomia and  metabolic complications (hypoglycemia, hyperbilirubinemia, hypocalcemia, erythema). She was advised of the association of diabetes mellitus with anomalies especially with risk of anomalies correlated with hemoglobin A1C. The higher the hemoglobin A1C, the higher the risk of anomalies. Hemoglobin A1C was discussed with 24-hour urine for creatinine clearance and protein was discussed as baseline test to check for any micro proteinuria. The need for ophthalmology consultation to rule out diabetic retinopathy was discussed and recommended ophthalmologic evaluation for that. Patient will schedule appointment with Eye doctor when due With risk of congenital heart defect, I discussed the option of fetal echocardiogram to assess cardiac anatomy and will schedule fetal echo around 20 weeks and will schedule level 2 obstetrical ultrasound around 20 weeks. .    I have shared with her the options of treatment including insulin treatment which is the gold standard of care for diabetes in pregnancy versus a recent use of glyburide or metformin as alternatives. Neither Glyburide nor metformin are indicated for treatment of Type 1 DM. Although, glyburide has been used for around 20 years as primary alternative to insulin, a recent meta-analyis (2015) suggested that metformin should be the alternative to insulin and not glyburide. The conclusion of the study showed a higher birth weight (100 g) associated with glyburide use compared to insulin, two fold higher risk of  hypoglycemia, and more than 2x higher risk of macrosomia associated with  glyburide use. This difference is likely to be secondary to hyperinsulinism in fetus secondary to fetal exposure to glyburide. Metformin, also had lower maternal weight gain, lower birth rate and less risk of macrosomia when compared with glyburide. When compared to insulin, Metformin had lower maternal weight gain, increased  birth and lower gestational age at delivery and lower incidence of gestational hypertension. There was a trend for less  hypoglycemia and high failure rate of 30-35%, when compared to insulin. In addition, the lack of long term data on glyburide and metformin use regarding safety was addressed and recommended use of Insulin for treatment, which is the gold standard, with excellent efficiency and safety, albeit more inconvenience. Questions were answered.    I recommend 2200 calorie ADA diet during pregnancy. It is recommended that she have 30 grams of carbohydrates with breakfast, and 60 grams with lunch and dinner. In addition, she should have three snacks in between meals with 15 grams of carbohydrates and at bedtime with 30 grams of carbohydrates.    Values discussed.  After counseling on Insulin use, side effects and administration, the patient agreed agreed to start outpatient treatment with insulin 14 of NPH in a.m. and 14 units of NPH at bedtime. With insulin use/metformin, there is risk for hypoglycemia. I discussed symptoms of hypoglycemia with recommendation to assess blood sugar then eat something high in sugar. I informed her that the best way to decrease episodes of low blood sugar is well balanced healthy diabetic diet with appropriate snacks between meals. Brochures given. Questions answered.    She was advised to keep her blood sugars checks and to call me weekly with her blood sugars. The need for strict blood sugar control with goals of treatment to keep pre-prandial blood sugars between 65 and 90 and 1 hr postprandial blood sugars less than 140 was  discussed.    Use asa as discussed.    Will plan to do level 2 ultrasound around 20 weeks.  Fetal testing  could be started in this setting around 230 weeks gestation. If additional complications occur, then closer fetal surveillance will be needed. She needs to do fetal kick counts throughout the pregnancy starting at 24 weeks.     She has order to do 24 hour urine, advised to do as soon as possible.      History of shoulder dystocia in previous pregnancy, antepartum  The risk of induction were discussed, including the risk of recurrence of shoulder dystocia. Maneuvers to relieve shoulder dystocia were explained. Recommend avoiding operative vaginal delivery in view of risk of shoulder dystocia in this setting, if EFW is similar or slight less than weight in affected pregnancy.     It would be reasonable to consider a primary cs if EFW is more than 8 lb (previous baby was 8 lb 4 oz), understanding the degree of error of ultrasound with potential lesser or higher actual fetal weight. Recommend limiting weight gain in pregnancy, as it is an independent risk factor for recurrence.      Seizure disorder in pregnancy  I discussed with her the association of seizure disorder with higher risk of anomalies even without any use of medication. There is an increased risk of both major and minor malformations in fetuses exposed to anti-epileptic drugs (4-6% vs population risk of 2-3%). The most common major congenital malformations associated with these medications are neural tube, congenital heart and urinary tract defects, skeletal abnormalities, and cleft palate. The risk of fetal malformations is strongly influenced by the specific medication used.    The risk is higher with the potential use of seizure medications.  Polytherapy with more than one antiepileptic drug increases the risk for malformations to 6-9%. In addition, the gestational timing of the exposure is critical as most organogenesis is complete by 10 weeks  gestation. At this stage (past first trimester), there is no additional risk of anomalies. I discussed that the risk of seizure outweigh any risk of medication and recommend her to continue current medication regimen per neurology, and I started her on Folic acid 1 mg daily. If she has any seizures, she needs to report that immediately.  It is also recommended to avoid driving or operating heavy/hazardous equipment until 6 months post last seizure.    There are risks to the fetus from maternal seizures. Fetal hypoxia may occur as a result of decreased placental blood flow or postictal apnea and there are risks of injury to the fetus, abruption, or miscarriage due to maternal trauma sustained during a seizure.    Additionally, the higher risk of fetal growth restriction with seizure disorder was addressed.  I recommend doing fetal kick counts throughout the second and third trimester. If evidence of fetal growth restriction or other complications occur, then more formal fetal surveillance will be needed.    Keppra is among the newer anti-epileptic drugs, and the risks are not yet certain, but small studies report minimal increased risks.     I discussed that the risk of seizure outweigh any risk of medication. I advised her to followup with neurologist for further evaluation and potential changing of medication In future pregnancy, recommend switching of antiepileptic prior to conception to one with higher safety profile should she get pregnant again.       Increased BMI in pregnancy  Body mass index is 31.47 kg/m².    I discussed the risk of miscarriage in first trimester, recurrent miscarriages, congenital anomalies, hypertension, diabetes,  labor and the higher risk of  section and the higher risk of fetal demise in-utero. There is also higher risk of for excessive fetal weight and large for gestational age (LGA) fetuses. Mothers with LGA fetuses are at higher risk of prolonged labor,   delivery, shoulder dystocia and birth trauma. LGA neonates are increased risk of fetal hypoxia and intrauterine death, and are at risk to develop diabetes, obesity, metabolic syndrome, asthma and cancer later in life. She was advised of the importance of eating healthy and limiting weight gain to 11-20 lbs during the pregnancy, as optimal in this situation. I recommended low calorie, low fat diet avoiding any additional excessive weight gain. Excess weight gain would be associated with gestational hypertension, gestational diabetes and adverse  outcomes, including fetal demise in utero.    It is important to do FKC from 24 weeks till delivery.       Importance of working on losing weight after the pregnancy is over, especially before a future pregnancy was discussed. Breastfeeding may be an important tool in reducing the postpartum weight retention. Fetal risks were discussed with short term risk of fetal/ obesity and long term risk of adolescent component of metabolic syndrome.      History of preeclampsia  I discussed with her higher risk of recurrence even at an earlier gestational age with morbidity and mortality associated with that. I advised her of the option of the potential benefit of baby aspirin to decrease risk of recurrence that outweighs potential risk associated with its use. Low-dose aspirin as preventive medication does not increase the risk for placental abruption, postpartum hemorrhage, or fetal intracranial bleeding. Low-dose aspirin (range, 60 to 150 mg/d) reduced the risk for preeclampsia by 24% in clinical trials and reduced the risk for  birth by 14% and FGR by 20%.      With her risk factors, including  preeclampsia/GHTN in a previous pregnancy and diabetes (T1DM or T2DM) BMI over 30, low socioeconomic status, and patient born with low birthweight or SGA, it was agreed to adjust to asa 81 mg BID, due to multiple risk factors for preeclampsia. After discussing benefits  (more effective than daily) vs potential risks (less data on safety with use at delivery), and continue until 34 6/7weeks, then decrease to once daily until delivery.. Also recommend baby aspirin use in all future pregnancies starting at 12 weeks and work on having healthy weight prior to any future pregnancy. Questions answered. Patient verbalized understanding.      Bipolar disorder in pregnancy  Discussed risks with depression and risks/benefits of antidepressant medication use in pregnancy. Although earlier limited data suggested association of paroxetine (Paxil) with right ventricular outflow tract obstruction and sertraline (Zoloft) with ventricular septal heart defects, a recent (2014) large population based study showed no substantial increase in the risk of cardiac malformations attributable to antidepressant use in 1st trimester. The absolute risk of other reported risks in some studies is very small: omphalocele of 1 in 5,000 births, craniosynostosis 1 in 1,800 births, and anencephaly of 1 in 1,000 births. I discussed with her that the SSRI medication used in pregnancy is associated with potential small risk of pulmonary hypertension when used after 20 weeks gestation (in 6-12 per thousand exposed women). However, discontinuing medication is associated with a higher risk with recurrence of symptoms . Relapse rate is 68% if medication is discontinued, vs 25% with continued medication use. Untreated depression may increase the risk of low weight gain, sexually transmitted diseases, alcohol & substance abuse, all of which have maternal and fetal health implications. Factors associated with relapse during pregnancy include a history of more than 5 years of depressive illness and of more than four episodes of relapse.     ACOG recommends that therapy for mental health disorders during pregnancy be individualized. Treatment of anxiety and depression should incorporate the clinical expertise of her mental health  clinician, her obstetrician, her primary care provider, and her pediatrician. The risks of untreated depression and the benefits of treatment must be weighed against the risks associated with the use of psychiatric medications during pregnancy.    Depending upon the severity of her symptoms and previous response to discontinued medication, consider weaning down or off medications if possible. Although discontinuing the medication for a few weeks before delivery  has been suggested, to avoid  withdrawal, the potential risks to mom and lack of proven benefit from this approach, makes continuing medication till delivery a reasonable option. Discussed the association of higher  morbidity with SSRI medication use close to delivery (in 30 % of neonates) including higher rate of admission to intensive care unit, jitteriness, mild respiratory distress, tachypnea of the , weak cry, and poor tone.     With symptom control, reasonable to continue Lexapro 10 mg daily at this time. She was also advised to report any worsening of symptoms or SI/HI tendencies immediately to provider/ER for prompt intervention.  She was advised to continue follow up care with her Mental Health provider.       Anemia in pregnancy  The World Health Organization (WHO) defines anemia as a hemoglobin level <11 g/dL (approximately equivalent to a hematocrit <33 percent) in the first trimester, <10.5 g/dL in the second trimester, <10.5 to 11 g/dL in the third trimester, or <10 g/dL postpartum. Anemia affects approximately 30 percent of reproductive-age females and 40 percent of pregnant individuals, mostly due to iron deficiency.       Physiologic anemia of pregnancy and iron deficiency are the two most common causes of anemia in pregnancy. Much less common causes of anemia include hemoglobinopathies (sickle cell, thalassemia), RBC membrane disorders (hereditary spherocytosis and had hereditary Elliptocytosis), and acquired anemias  (folate deficiency, vitamin B12 deficiency, other vitamin deficiencies, autoimmune hemolytic anemia Anemia, hypothyroidism and chronic kidney disease). All gravidas with anemia or symptoms of anemia should have prompt testing for iron deficiency because iron deficiency can progress to anemia; iron deficiency is the most common pathologic cause of anemia in pregnancy.    I discussed with her that iron deficiency during the first two trimesters of pregnancy is associated with a 2-fold increased risk for  delivery and a 3-fold increased risk for delivering a low-birth-weight baby.    Advised patient to take her iron twice a day and we will add vitamin-C to take twice a day to increase absorption.  Advised her to continue the folic acid daily.  Order given for iron studies.  Recommend intermittent CBC throughout pregnancy to assess response to therapy.      Follow up in about 2 weeks (around 11/15/2023) for MFM follow-up, Send sugars weekly.     Future Appointments   Date Time Provider Department Center   11/15/2023  9:15 AM [facility]   2023 11:05 AM Isma Payne MD [facility] Ob   2023  1:15 PM Zhanna High MD [facility] [place] [facility]        Blood sugars little bit elevated with fastings in the 100s and in the afternoon elevated up to 150s no other sugars reported.  Agreed to start with the NPH only in the morning at bedtime and adjust later after counseling on the options of different alternatives, agreed to start aspirin twice a day.  Continue hematinic therapy with added vitamin-C to help with the absorption.  After counseling, she would like to continue Lexapro at this time, as a shared decision-making process in this regard. accompanied by partner father of the baby Prakash Menjivar    Patient was evaluated by JAYCE Chan and Dr. Juárez.  Final assessment and recommendations as stated above were made by Dr. Juárez.    Components of this note were  documented using voice recognition systems and are subject to errors not corrected at proofreading.  Please contact the author for any clarifications.

## 2023-11-15 ENCOUNTER — OFFICE VISIT (OUTPATIENT)
Dept: MATERNAL FETAL MEDICINE | Facility: CLINIC | Age: 23
End: 2023-11-15
Payer: MEDICAID

## 2023-11-15 VITALS
BODY MASS INDEX: 31.5 KG/M2 | HEIGHT: 66 IN | SYSTOLIC BLOOD PRESSURE: 125 MMHG | HEART RATE: 100 BPM | DIASTOLIC BLOOD PRESSURE: 85 MMHG | WEIGHT: 196 LBS

## 2023-11-15 DIAGNOSIS — O99.012 ANEMIA DURING PREGNANCY IN SECOND TRIMESTER: ICD-10-CM

## 2023-11-15 DIAGNOSIS — O24.112 PRE-EXISTING TYPE 2 DIABETES MELLITUS DURING PREGNANCY IN SECOND TRIMESTER: ICD-10-CM

## 2023-11-15 DIAGNOSIS — O99.342 BIPOLAR DISEASE DURING PREGNANCY IN SECOND TRIMESTER: ICD-10-CM

## 2023-11-15 DIAGNOSIS — O09.299 H/O SHOULDER DYSTOCIA IN PRIOR PREGNANCY, CURRENTLY PREGNANT: ICD-10-CM

## 2023-11-15 DIAGNOSIS — G40.909 SEIZURE DISORDER DURING PREGNANCY IN SECOND TRIMESTER: Primary | ICD-10-CM

## 2023-11-15 DIAGNOSIS — O09.292 HX OF PREECLAMPSIA, PRIOR PREGNANCY, CURRENTLY PREGNANT, SECOND TRIMESTER: ICD-10-CM

## 2023-11-15 DIAGNOSIS — F31.9 BIPOLAR DISEASE DURING PREGNANCY IN SECOND TRIMESTER: ICD-10-CM

## 2023-11-15 DIAGNOSIS — O09.90 AT HIGH RISK FOR COMPLICATIONS OF INTRAUTERINE PREGNANCY (IUP): ICD-10-CM

## 2023-11-15 DIAGNOSIS — O99.352 SEIZURE DISORDER DURING PREGNANCY IN SECOND TRIMESTER: Primary | ICD-10-CM

## 2023-11-15 DIAGNOSIS — O99.212 OBESITY AFFECTING PREGNANCY IN SECOND TRIMESTER, UNSPECIFIED OBESITY TYPE: ICD-10-CM

## 2023-11-15 PROCEDURE — 3074F SYST BP LT 130 MM HG: CPT | Mod: CPTII,S$GLB,, | Performed by: OBSTETRICS & GYNECOLOGY

## 2023-11-15 PROCEDURE — 3079F DIAST BP 80-89 MM HG: CPT | Mod: CPTII,S$GLB,, | Performed by: OBSTETRICS & GYNECOLOGY

## 2023-11-15 PROCEDURE — 1159F MED LIST DOCD IN RCRD: CPT | Mod: CPTII,S$GLB,, | Performed by: OBSTETRICS & GYNECOLOGY

## 2023-11-15 PROCEDURE — 3044F PR MOST RECENT HEMOGLOBIN A1C LEVEL <7.0%: ICD-10-PCS | Mod: CPTII,S$GLB,, | Performed by: OBSTETRICS & GYNECOLOGY

## 2023-11-15 PROCEDURE — 99214 OFFICE O/P EST MOD 30 MIN: CPT | Mod: TH,S$GLB,, | Performed by: OBSTETRICS & GYNECOLOGY

## 2023-11-15 PROCEDURE — 99214 PR OFFICE/OUTPT VISIT, EST, LEVL IV, 30-39 MIN: ICD-10-PCS | Mod: TH,S$GLB,, | Performed by: OBSTETRICS & GYNECOLOGY

## 2023-11-15 PROCEDURE — 3079F PR MOST RECENT DIASTOLIC BLOOD PRESSURE 80-89 MM HG: ICD-10-PCS | Mod: CPTII,S$GLB,, | Performed by: OBSTETRICS & GYNECOLOGY

## 2023-11-15 PROCEDURE — 1159F PR MEDICATION LIST DOCUMENTED IN MEDICAL RECORD: ICD-10-PCS | Mod: CPTII,S$GLB,, | Performed by: OBSTETRICS & GYNECOLOGY

## 2023-11-15 PROCEDURE — 3008F BODY MASS INDEX DOCD: CPT | Mod: CPTII,S$GLB,, | Performed by: OBSTETRICS & GYNECOLOGY

## 2023-11-15 PROCEDURE — 3008F PR BODY MASS INDEX (BMI) DOCUMENTED: ICD-10-PCS | Mod: CPTII,S$GLB,, | Performed by: OBSTETRICS & GYNECOLOGY

## 2023-11-15 PROCEDURE — 3074F PR MOST RECENT SYSTOLIC BLOOD PRESSURE < 130 MM HG: ICD-10-PCS | Mod: CPTII,S$GLB,, | Performed by: OBSTETRICS & GYNECOLOGY

## 2023-11-15 PROCEDURE — 3044F HG A1C LEVEL LT 7.0%: CPT | Mod: CPTII,S$GLB,, | Performed by: OBSTETRICS & GYNECOLOGY

## 2023-11-15 NOTE — PROGRESS NOTES
Maternal Fetal Medicine Follow Up Consult    Subjective     Patient ID: [id number]    Chief Complaint: M follow up with US (H/o shoulder dystocia in prior pregnancy, Type 2 Diabetes.  Patient did not bring sugar log Fasting , Post lunch 107-130 and Post supper 155.)      HPI: Norma Denise is a 23 y.o. adult  at 20w4d gestation with Estimated Date of Delivery: 3/30/24  who is here for follow  up consultation by M.  She has type 2 diabetes, diagnosed as a child.  She was on metformin previously but discontinued it recently due to feeling sick with it. She is supposed to be on 14 units of NPH in a.m. and 14 units of NPH at bedtime.  She stated she stopped taking the insulin because she was feeling dizzy and sleepy for a couple of days.  She did not bring her sugar log with her.  But reported fasting between , and post lunch 107-130, and after dinner 1 reading at 155.  On 10/19/2023, hemoglobin A1c was 5.5.  TSH was 1.36. She has history of shoulder dystocia in her last pregnancy.  Baby weighed 8 lb 4 oz and she reports had some nerve damage to 1 arm and does physical therapy.  She had preeclampsia in her last pregnancy.  She is taking low-dose aspirin daily.  She has history of seizure disorder, diagnosed in childhood.  She is currently on Keppra 500 mg twice daily after report of recent seizure earlier in October.  She is also on folic acid 1 mg daily She denies any personal or family history of aneuploidy or anomalies.  She reports she has an appointment with Neurology in December with Dr. High.  She has increased BMI of 31 at consult visit.  She is on low-dose aspirin twice daily.  She has history of bipolar disorder and depression and is on Lexapro 10 mg daily.  She has history of anemia and is on oral hematinic therapy.  On 10/19/2023, H&H 9.2/30.4.  She was given order to do iron studies consult visit which she has not done yet.         Interval history since last [facility] visit: None.. She  "denies any leaking fluid, vaginal bleeding, contractions, decreased fetal movement. Denies headaches, visual disturbances, or epigastric pain.    Pregnancy complications include:   Patient Active Problem List   Diagnosis    Bipolar disease during pregnancy in second trimester    Pre-existing type 2 diabetes mellitus during pregnancy in second trimester    Seizure disorder during pregnancy in second trimester    High-risk pregnancy in second trimester    H/O shoulder dystocia in prior pregnancy, currently pregnant    Hx of preeclampsia, prior pregnancy, currently pregnant, second trimester    At high risk for complications of intrauterine pregnancy (IUP)    Increased BMI affecting pregnancy in second trimester    Anemia during pregnancy in second trimester        No changes to medical, surgical, family, social, or obstetric history.    Medications:  Current Outpatient Medications   Medication Instructions    albuterol (PROVENTIL/VENTOLIN HFA) 90 mcg/actuation inhaler 1-2 puffs, Inhalation, Every 6 hours PRN, Rescue    ascorbic acid (vitamin C) (VITAMIN C) 250 mg, Oral, 2 times daily    aspirin (ECOTRIN) 162 mg, Oral, Daily, Start at 12 weeks gestation.  At 36 weeks gestation, decrease to one 81mg tablet daily.    blood sugar diagnostic (TRUE METRIX GLUCOSE TEST STRIP) Strp USE TO TEST BLOOD GLUCOSE AS DIRECTED 6-8 X/DAY    blood sugar diagnostic Strp To check BG 4 times daily, to use with insurance preferred meter    blood-glucose meter kit To check BG 4 times daily, to use with insurance preferred meter    EScitalopram oxalate (LEXAPRO) 10 mg, Oral, Daily    folic acid (FOLVITE) 1 mg, Oral, Daily    insulin NPH (HUMULIN N NPH U-100 INSULIN) 100 unit/mL injection Inject 14 units in AM and 14 units at bedtime.  OK to substitute    insulin syringe-needle U-100 0.5 mL 31 gauge x 5/16" Syrg 1 Syringe, Misc.(Non-Drug; Combo Route), 2 times daily    lancets Misc To check BG 4 times daily, to use with insurance preferred " "meter    levETIRAcetam (KEPPRA) 500 mg, Oral, 2 times daily    mirtazapine (REMERON) 30 mg, Oral, Nightly    ondansetron (ZOFRAN-ODT) 4 mg, Oral, Every 6 hours PRN    prenatal vit no.124-iron-folic (PRENATAL VITAMIN) 27 mg iron- 800 mcg Tab 1 tablet, Oral, Daily     27 mg iron- 1 mg Tab 1 tablet, Oral    TRUE METRIX GLUCOSE METER Misc USE TO CHECK BLOOD GLUCOSE FOUR TIMES DAILY.    TRUEPLUS LANCETS 33 gauge Misc USE TO CHECK BLOOD GLUCOSE FOUR TIMES DAILY.       Review of Systems   12 point review of systems conducted, negative except as stated in the history of present illness. See HPI for details.      Objective     Visit Vitals  /85 (BP Location: Right arm, Patient Position: Sitting, BP Method: Large (Automatic))   Pulse 100   Ht 5' 6" (1.676 m)   Wt 88.9 kg (196 lb)   LMP 2023 (Exact Date)   BMI 31.64 kg/m²        Physical Exam  Vitals and nursing note reviewed.   Constitutional:       Appearance: Normal appearance.      Comments: Increased BMI   HENT:      Head: Normocephalic and atraumatic.      Nose: Nose normal. No congestion.   Cardiovascular:      Rate and Rhythm: Normal rate.   Pulmonary:      Effort: Pulmonary effort is normal.   Skin:     Findings: No rash.   Neurological:      Mental Status: Arlen Denise is alert and oriented to person, place, and time.   Psychiatric:         Mood and Affect: Mood normal.         Behavior: Behavior normal.         Thought Content: Thought content normal.         Judgment: Judgment normal.           ASSESSMENT/PLAN:     23 y.o.  female with IUP at 20w4d    Type 2 diabetes mellitus in pregnancy  Risks associated with diabetes in pregnancy include higher risk for polyhydramnios, fetal macrosomia and  metabolic complications (hypoglycemia, hyperbilirubinemia, hypocalcemia, erythema).     Continue 2200 calorie ADA diet during pregnancy. It is recommended that she have 30 grams of carbohydrates with breakfast, and 60 grams with lunch and " dinner. In addition, she should have three snacks in between meals with 15 grams of carbohydrates and at bedtime with 30 grams of carbohydrates.    Patient did not bring sugar log.  Values discussed.  She reports fasting , 1 hour post lunch 107-130, and 1 hour post supper 155.  She reports that when she takes her insulin she sometimes feels bad (dizzy and sleepy).  She reports she stopped the insulin a couple of days ago.  Patient advised to adjust dose of insulin to 10 units of NPH in the morning and 10 units of NPH at bedtime.    She was advised to keep her blood sugars checks and to call me weekly with her blood sugars. The need for strict blood sugar control with goals of treatment to keep pre-prandial blood sugars between 65 and 90 and 1 hr postprandial blood sugars less than 140 was reviewed.     Low dose aspirin as discussed.    Will plan to recheck fetal growth i every 4-5 weeks. Fetal testing  could be started in this setting around 30 weeks gestation .She needs to do fetal kick counts throughout the pregnancy starting at 24 weeks.     She has order to do 24 hour urine, advised to do as soon as possible.    Referral sent for fetal echocardiogram today.      History of shoulder dystocia in previous pregnancy, antepartum  It would be reasonable to consider a primary  if EFW is more than 8 lb (previous baby was 8 lb 4 oz), understanding the degree of error of ultrasound with potential lesser or higher actual fetal weight. Recommend limiting weight gain in pregnancy and adequate sugar control, which are risk factors for recurrence.      Seizure disorder in pregnancy  I reviewed with her the association of seizure disorder with higher risk of anomalies even without any use of medication. I discussed that the risk of seizure outweigh any risk of medication and recommend her to continue current medication regimen per neurology (Keppra 50 mg twice daily), and continue Folic acid 1 mg daily. If she  has any seizures, she needs to report that immediately.  It is also recommended to avoid driving or operating heavy/hazardous equipment until 6 months post last seizure.    There are risks to the fetus from maternal seizures. Fetal hypoxia may occur as a result of decreased placental blood flow or postictal apnea and there are risks of injury to the fetus, abruption, or miscarriage due to maternal trauma sustained during a seizure.    Additionally, the higher risk of fetal growth restriction with seizure disorder was previously addressed.  I recommend serial ultrasounds to monitor fetal growth. I recommend doing fetal kick counts throughout the second and third trimester. If evidence of fetal growth restriction or other complications occur, then more formal fetal surveillance will be needed.      Increased BMI in pregnancy  Body mass index is 31.64 kg/m². With reasonable weight gain since last visit, she was advised to continue a healthy low caloric diet and diabetic diet.  Excess weight gain would be associated with gestational hypertension, gestational diabetes and adverse  outcomes, including fetal demise in utero.    With risk factors associated with increased BMI, she is to do fetal kick counts throughout the pregnancy (after 24 weeks).    It is important to lose weight after the pregnancy is over, especially before a future pregnancy was discussed. Breastfeeding may be an important tool in reducing the postpartum weight retention. Fetal risks were discussed with short term risk of fetal/ obesity and long term risk of adolescent component of metabolic syndrome.      History of preeclampsia  With increased risk for recurrence, it was agreed to continue asa 81 mg BID until 34 6/7 weeks, then decrease to once daily until delivery.. Preeclampsia precautions reviewed.      Bipolar disorder in pregnancy  Reviewed risks with depression and risks/benefits of medication use in pregnancy.    Although  discontinuing the medication for a few weeks before delivery  has been suggested, to avoid  withdrawal, the potential risks to mom and lack of proven benefit from this approach, makes continuing medication till delivery a reasonable option.With symptom control, reasonable to continue Lexapro 10 mg daily.     She was also advised to report any worsening of symptoms or SI/HI tendencies immediately to provider/ER for prompt intervention. She was advised to continue follow up care with her Mental Health provider.        Anemia in pregnancy  Anemia in pregnancy is associated with a 2-fold increased risk for  delivery and 3-fold increased risk for delivering a low birth weight baby.      Continue current supplementation. Recommend intermittent H/H throughout the pregnancy to assess response to supplementation and guide titration of dosing.     Advised to do iron studies previously ordered.      Patient has had no seizures we will continue Keppra 500 twice a day along with folic acid 1 mg daily.  Reviewed the issue with the sugars.  Advised her that dizziness and sleepiness are nonspecific symptoms.  If the sugars get low she needs to report that and will adjusted dose of insulin as needed.  At this time with a reported 1 sugar reading at 50, advised patient to adjusted dose of NPH to 10 units in the morning 10 units at night and call her sugars this coming Monday and next Monday.  Will plan to see her again in 2 weeks.  She will continue taking aspirin daily and continue hematinic therapy.    Follow up in about 2 weeks (around 2023) for MFM follow-up, Remind to call blood sugars weekly.     Future Appointments   Date Time Provider Department Center   2023 11:05 AM Isma Payne MD [facility] Ob   2023  1:15 PM Zhanna High MD [facility] NEURO [facility]        [employer] involvement: Patient was evaluated by JAYCE Chan and Dr. Juárez.  Final assessment and recommendations as stated  above were made by Dr. Juárez.    Components of this note were documented using voice recognition systems and are subject to errors not corrected at proofreading. Please contact the author for any clarifications.

## 2023-11-29 ENCOUNTER — OFFICE VISIT (OUTPATIENT)
Dept: MATERNAL FETAL MEDICINE | Facility: CLINIC | Age: 23
End: 2023-11-29
Payer: MEDICAID

## 2023-11-29 VITALS
HEART RATE: 103 BPM | HEIGHT: 66 IN | SYSTOLIC BLOOD PRESSURE: 120 MMHG | DIASTOLIC BLOOD PRESSURE: 70 MMHG | WEIGHT: 198 LBS | BODY MASS INDEX: 31.82 KG/M2

## 2023-11-29 DIAGNOSIS — O99.012 ANEMIA DURING PREGNANCY IN SECOND TRIMESTER: ICD-10-CM

## 2023-11-29 DIAGNOSIS — O09.292 HX OF PREECLAMPSIA, PRIOR PREGNANCY, CURRENTLY PREGNANT, SECOND TRIMESTER: ICD-10-CM

## 2023-11-29 DIAGNOSIS — O99.212 OBESITY AFFECTING PREGNANCY IN SECOND TRIMESTER, UNSPECIFIED OBESITY TYPE: ICD-10-CM

## 2023-11-29 DIAGNOSIS — F31.9 BIPOLAR DISEASE DURING PREGNANCY IN SECOND TRIMESTER: ICD-10-CM

## 2023-11-29 DIAGNOSIS — O99.352 SEIZURE DISORDER DURING PREGNANCY IN SECOND TRIMESTER: Primary | ICD-10-CM

## 2023-11-29 DIAGNOSIS — O99.342 BIPOLAR DISEASE DURING PREGNANCY IN SECOND TRIMESTER: ICD-10-CM

## 2023-11-29 DIAGNOSIS — O09.90 AT HIGH RISK FOR COMPLICATIONS OF INTRAUTERINE PREGNANCY (IUP): ICD-10-CM

## 2023-11-29 DIAGNOSIS — O09.299 H/O SHOULDER DYSTOCIA IN PRIOR PREGNANCY, CURRENTLY PREGNANT: ICD-10-CM

## 2023-11-29 DIAGNOSIS — G40.909 SEIZURE DISORDER DURING PREGNANCY IN SECOND TRIMESTER: Primary | ICD-10-CM

## 2023-11-29 DIAGNOSIS — O24.112 PRE-EXISTING TYPE 2 DIABETES MELLITUS DURING PREGNANCY IN SECOND TRIMESTER: ICD-10-CM

## 2023-11-29 PROCEDURE — 3078F PR MOST RECENT DIASTOLIC BLOOD PRESSURE < 80 MM HG: ICD-10-PCS | Mod: CPTII,S$GLB,, | Performed by: OBSTETRICS & GYNECOLOGY

## 2023-11-29 PROCEDURE — 3008F PR BODY MASS INDEX (BMI) DOCUMENTED: ICD-10-PCS | Mod: CPTII,S$GLB,, | Performed by: OBSTETRICS & GYNECOLOGY

## 2023-11-29 PROCEDURE — 3078F DIAST BP <80 MM HG: CPT | Mod: CPTII,S$GLB,, | Performed by: OBSTETRICS & GYNECOLOGY

## 2023-11-29 PROCEDURE — 1159F PR MEDICATION LIST DOCUMENTED IN MEDICAL RECORD: ICD-10-PCS | Mod: CPTII,S$GLB,, | Performed by: OBSTETRICS & GYNECOLOGY

## 2023-11-29 PROCEDURE — 3074F SYST BP LT 130 MM HG: CPT | Mod: CPTII,S$GLB,, | Performed by: OBSTETRICS & GYNECOLOGY

## 2023-11-29 PROCEDURE — 99213 OFFICE O/P EST LOW 20 MIN: CPT | Mod: TH,S$GLB,, | Performed by: OBSTETRICS & GYNECOLOGY

## 2023-11-29 PROCEDURE — 3044F HG A1C LEVEL LT 7.0%: CPT | Mod: CPTII,S$GLB,, | Performed by: OBSTETRICS & GYNECOLOGY

## 2023-11-29 PROCEDURE — 3074F PR MOST RECENT SYSTOLIC BLOOD PRESSURE < 130 MM HG: ICD-10-PCS | Mod: CPTII,S$GLB,, | Performed by: OBSTETRICS & GYNECOLOGY

## 2023-11-29 PROCEDURE — 3044F PR MOST RECENT HEMOGLOBIN A1C LEVEL <7.0%: ICD-10-PCS | Mod: CPTII,S$GLB,, | Performed by: OBSTETRICS & GYNECOLOGY

## 2023-11-29 PROCEDURE — 99213 PR OFFICE/OUTPT VISIT, EST, LEVL III, 20-29 MIN: ICD-10-PCS | Mod: TH,S$GLB,, | Performed by: OBSTETRICS & GYNECOLOGY

## 2023-11-29 PROCEDURE — 1159F MED LIST DOCD IN RCRD: CPT | Mod: CPTII,S$GLB,, | Performed by: OBSTETRICS & GYNECOLOGY

## 2023-11-29 PROCEDURE — 3008F BODY MASS INDEX DOCD: CPT | Mod: CPTII,S$GLB,, | Performed by: OBSTETRICS & GYNECOLOGY

## 2023-11-29 NOTE — PROGRESS NOTES
Maternal Fetal Medicine Follow Up Consult    Subjective     Patient ID: [id number]    Chief Complaint: M follow up       HPI: Norma Denise is a 23 y.o. adult  at 22w4d gestation with Estimated Date of Delivery: 3/30/24  who is here for follow  up consultation by [facility].  She has type 2 diabetes, diagnosed as a child.  She was on metformin previously but discontinued it recently due to feeling sick with it. She is supposed to be on 10 units of NPH in a.m. and 10 units of NPH at bedtime. On 10/19/2023, hemoglobin A1c was 5.5.  TSH was 1.36. She has history of shoulder dystocia in her last pregnancy.  Baby weighed 8 lb 4 oz and she reports had some nerve damage to 1 arm and does physical therapy.  She had preeclampsia in her last pregnancy.  She is taking low-dose aspirin daily.  She has history of seizure disorder, diagnosed in childhood.  She is currently on Keppra 500 mg twice daily after report of seizure earlier in October.  She is also on folic acid 1 mg daily.  She reports she has an appointment with Neurology in December with Dr. High.  She has increased BMI of 31 at consult visit.  She is on low-dose aspirin twice daily.  She has history of bipolar disorder and depression and is on Lexapro 10 mg daily.  She has history of anemia and is on oral hematinic therapy.  On 10/19/2023, H&H 9.2/30.4.  She was given order to do iron studies consult visit which she has not done yet.         Interval history since last [facility] visit: None.. She denies any leaking fluid, vaginal bleeding, contractions, decreased fetal movement. Denies headaches, visual disturbances, or epigastric pain.    Pregnancy complications include:   Patient Active Problem List   Diagnosis    Bipolar disease during pregnancy in second trimester    Pre-existing type 2 diabetes mellitus during pregnancy in second trimester    Seizure disorder during pregnancy in second trimester    High-risk pregnancy in second trimester    H/O shoulder dystocia in  "prior pregnancy, currently pregnant    Hx of preeclampsia, prior pregnancy, currently pregnant, second trimester    At high risk for complications of intrauterine pregnancy (IUP)    Increased BMI affecting pregnancy in second trimester    Anemia during pregnancy in second trimester        No changes to medical, surgical, family, social, or obstetric history.    Medications:  Current Outpatient Medications   Medication Instructions    albuterol (PROVENTIL/VENTOLIN HFA) 90 mcg/actuation inhaler 1-2 puffs, Inhalation, Every 6 hours PRN, Rescue    ascorbic acid (vitamin C) (VITAMIN C) 250 mg, Oral, 2 times daily    aspirin (ECOTRIN) 162 mg, Oral, Daily, Start at 12 weeks gestation.  At 36 weeks gestation, decrease to one 81mg tablet daily.    blood sugar diagnostic (TRUE METRIX GLUCOSE TEST STRIP) Strp USE TO TEST BLOOD GLUCOSE AS DIRECTED 6-8 X/DAY    EScitalopram oxalate (LEXAPRO) 10 mg, Oral, Daily    folic acid (FOLVITE) 1 mg, Oral, Daily    insulin NPH (HUMULIN N NPH U-100 INSULIN) 100 unit/mL injection Inject 14 units in AM and 14 units at bedtime.  OK to substitute    insulin syringe-needle U-100 0.5 mL 31 gauge x 5/16" Syrg 1 Syringe, Misc.(Non-Drug; Combo Route), 2 times daily    levETIRAcetam (KEPPRA) 500 mg, Oral, 2 times daily    mirtazapine (REMERON) 30 mg, Oral, Nightly    ondansetron (ZOFRAN-ODT) 4 mg, Oral, Every 6 hours PRN    prenatal vit no.124-iron-folic (PRENATAL VITAMIN) 27 mg iron- 800 mcg Tab 1 tablet, Oral, Daily    TRUE METRIX GLUCOSE METER Misc USE TO CHECK BLOOD GLUCOSE FOUR TIMES DAILY.    TRUEPLUS LANCETS 33 gauge Misc USE TO CHECK BLOOD GLUCOSE FOUR TIMES DAILY.       Review of Systems   12 point review of systems conducted, negative except as stated in the history of present illness. See HPI for details.      Objective     Visit Vitals  /70 (BP Location: Left arm, Patient Position: Sitting, BP Method: Large (Automatic))   Pulse 103   Ht 5' 6" (1.676 m)   Wt 89.8 kg (198 lb)   LMP " 2023 (Exact Date)   BMI 31.96 kg/m²        Physical Exam  Vitals and nursing note reviewed.   Constitutional:       Appearance: Normal appearance.      Comments: Increased BMI   HENT:      Head: Normocephalic and atraumatic.      Nose: Nose normal. No congestion.   Cardiovascular:      Rate and Rhythm: Normal rate.   Pulmonary:      Effort: Pulmonary effort is normal.   Skin:     Findings: No rash.   Neurological:      Mental Status: Arlen Garrisonet is alert and oriented to person, place, and time.   Psychiatric:         Mood and Affect: Mood normal.         Behavior: Behavior normal.         Thought Content: Thought content normal.         Judgment: Judgment normal.           ASSESSMENT/PLAN:     23 y.o.  female with IUP at 22w4d    Type 2 diabetes mellitus in pregnancy  Risks associated with diabetes in pregnancy include higher risk for polyhydramnios, fetal macrosomia and  metabolic complications (hypoglycemia, hyperbilirubinemia, hypocalcemia, erythema).     Continue 2200 calorie ADA diet during pregnancy. It is recommended that she have 30 grams of carbohydrates with breakfast, and 60 grams with lunch and dinner. In addition, she should have three snacks in between meals with 15 grams of carbohydrates and at bedtime with 30 grams of carbohydrates.    Patient did not bring sugar log.  Values discussed. Patient advised to adjust dose of insulin to 10 units of NPH in the morning and 6 units of Humalog before supper, 10 units of NPH at bedtime.  Advised her to have corrective insulin of 1 unit for every 8 mg of sugar over 140.     She was advised to keep her blood sugars checks and to call me weekly with her blood sugars. The need for strict blood sugar control with goals of treatment to keep pre-prandial blood sugars between 65 and 90 and 1 hr postprandial blood sugars less than 140 was reviewed.     Low dose aspirin as discussed.    Will plan to recheck fetal growth every 4-5 weeks. Fetal  testing  could be started in this setting around 30 weeks gestation .She needs to do fetal kick counts throughout the pregnancy starting at 24 weeks.     She has order to do 24 hour urine, advised to do as soon as possible.    Referral sent for fetal echocardiogram today.      History of shoulder dystocia in previous pregnancy, antepartum  It would be reasonable to consider a primary  if EFW is more than 8 lb (previous baby was 8 lb 4 oz), understanding the degree of error of ultrasound with potential lesser or higher actual fetal weight. Recommend limiting weight gain in pregnancy and adequate sugar control, which are risk factors for recurrence.      Seizure disorder in pregnancy  I reviewed with her the association of seizure disorder with higher risk of anomalies even without any use of medication. I discussed that the risk of seizure outweigh any risk of medication and recommend her to continue current medication regimen per neurology (Keppra 50 mg twice daily), and continue Folic acid 1 mg daily. If she has any seizures, she needs to report that immediately.  It is also recommended to avoid driving or operating heavy/hazardous equipment until 6 months post last seizure.    There are risks to the fetus from maternal seizures. Fetal hypoxia may occur as a result of decreased placental blood flow or postictal apnea and there are risks of injury to the fetus, abruption, or miscarriage due to maternal trauma sustained during a seizure.    Additionally, the higher risk of fetal growth restriction with seizure disorder was previously addressed.  I recommend serial ultrasounds to monitor fetal growth. I recommend doing fetal kick counts throughout the second and third trimester. If evidence of fetal growth restriction or other complications occur, then more formal fetal surveillance will be needed.      Increased BMI in pregnancy  Body mass index is 31.96 kg/m². With reasonable weight gain since last visit,  she was advised to continue a healthy low caloric diet and diabetic diet.  Excess weight gain would be associated with gestational hypertension, gestational diabetes and adverse  outcomes, including fetal demise in utero.    With risk factors associated with increased BMI, she is to do fetal kick counts throughout the pregnancy (after 24 weeks).    It is important to lose weight after the pregnancy is over, especially before a future pregnancy was discussed. Breastfeeding may be an important tool in reducing the postpartum weight retention. Fetal risks were discussed with short term risk of fetal/ obesity and long term risk of adolescent component of metabolic syndrome.      History of preeclampsia  With increased risk for recurrence, it was agreed to continue asa 81 mg BID until 34 6/7 weeks, then decrease to once daily until delivery.. Preeclampsia precautions reviewed.      Bipolar disorder in pregnancy  Reviewed risks with depression and risks/benefits of medication use in pregnancy.    Although discontinuing the medication for a few weeks before delivery  has been suggested, to avoid  withdrawal, the potential risks to mom and lack of proven benefit from this approach, makes continuing medication till delivery a reasonable option.With symptom control, reasonable to continue Lexapro 10 mg daily.     She was also advised to report any worsening of symptoms or SI/HI tendencies immediately to provider/ER for prompt intervention. She was advised to continue follow up care with her Mental Health provider.        Anemia in pregnancy  Anemia in pregnancy is associated with a 2-fold increased risk for  delivery and 3-fold increased risk for delivering a low birth weight baby.      Continue current supplementation. Recommend intermittent H/H throughout the pregnancy to assess response to supplementation and guide titration of dosing.     Reminded to do iron studies previously ordered.   Patient was advised to go do the iron studies today.      Follow up in about 2 weeks (around 12/13/2023) for MFM follow-up, Repeat ultrasound, Room 1 or 2.     Future Appointments   Date Time Provider Department Center   12/14/2023  2:20 PM Isma Payne MD [facility]   12/27/2023  1:15 PM Zhanna High MD [facility] NEURO [facility]        [employer] involvement: Patient was evaluated and examined by Dr. Juárez. JAYCE Chan, helped in pre charting of part of note.    Components of this note were documented using voice recognition systems and are subject to errors not corrected at proofreading. Please contact the author for any clarifications.

## 2023-12-01 ENCOUNTER — TELEPHONE (OUTPATIENT)
Dept: MATERNAL FETAL MEDICINE | Facility: CLINIC | Age: 23
End: 2023-12-01
Payer: MEDICAID

## 2023-12-01 NOTE — TELEPHONE ENCOUNTER
Called Pt to inquire about overdue labs. She says she is going to get them done on tomorrow and that she forgot.

## 2023-12-05 DIAGNOSIS — O99.012 ANEMIA DURING PREGNANCY IN SECOND TRIMESTER: ICD-10-CM

## 2023-12-05 DIAGNOSIS — O24.112 PRE-EXISTING TYPE 2 DIABETES MELLITUS DURING PREGNANCY IN SECOND TRIMESTER: ICD-10-CM

## 2023-12-05 RX ORDER — INSULIN HUMAN 100 [IU]/ML
INJECTION, SUSPENSION SUBCUTANEOUS
Qty: 10 ML | Refills: 3 | Status: CANCELLED | OUTPATIENT
Start: 2023-12-05

## 2023-12-06 ENCOUNTER — TELEPHONE (OUTPATIENT)
Dept: MATERNAL FETAL MEDICINE | Facility: CLINIC | Age: 23
End: 2023-12-06
Payer: MEDICAID

## 2023-12-06 RX ORDER — NAPROXEN SODIUM 220 MG
1 TABLET ORAL 2 TIMES DAILY
Qty: 60 EACH | Refills: 3 | Status: SHIPPED | OUTPATIENT
Start: 2023-12-06 | End: 2024-01-05

## 2023-12-06 RX ORDER — BLACK COHOSH ROOT 200 MG
250 CAPSULE ORAL 2 TIMES DAILY
Qty: 60 EACH | Refills: 3 | Status: SHIPPED | OUTPATIENT
Start: 2023-12-06 | End: 2024-01-05

## 2023-12-06 RX ORDER — INSULIN HUMAN 100 [IU]/ML
INJECTION, SUSPENSION SUBCUTANEOUS
Qty: 10 ML | Refills: 3 | Status: ON HOLD | OUTPATIENT
Start: 2023-12-06 | End: 2024-03-08 | Stop reason: HOSPADM

## 2023-12-06 NOTE — TELEPHONE ENCOUNTER
Called pharmacy. Pt's meds are ready for pickup.       Called Pt to inform her that her meds are ready for pickup.

## 2023-12-11 DIAGNOSIS — O09.90 AT HIGH RISK FOR COMPLICATIONS OF INTRAUTERINE PREGNANCY (IUP): ICD-10-CM

## 2023-12-11 DIAGNOSIS — O99.342 BIPOLAR DISEASE DURING PREGNANCY IN SECOND TRIMESTER: ICD-10-CM

## 2023-12-11 DIAGNOSIS — O99.012 ANEMIA DURING PREGNANCY IN SECOND TRIMESTER: ICD-10-CM

## 2023-12-11 DIAGNOSIS — O24.112 PRE-EXISTING TYPE 2 DIABETES MELLITUS DURING PREGNANCY IN SECOND TRIMESTER: ICD-10-CM

## 2023-12-11 DIAGNOSIS — G40.909 SEIZURE DISORDER DURING PREGNANCY IN SECOND TRIMESTER: Primary | ICD-10-CM

## 2023-12-11 DIAGNOSIS — F31.9 BIPOLAR DISEASE DURING PREGNANCY IN SECOND TRIMESTER: ICD-10-CM

## 2023-12-11 DIAGNOSIS — O99.352 SEIZURE DISORDER DURING PREGNANCY IN SECOND TRIMESTER: Primary | ICD-10-CM

## 2023-12-13 ENCOUNTER — PROCEDURE VISIT (OUTPATIENT)
Dept: MATERNAL FETAL MEDICINE | Facility: CLINIC | Age: 23
End: 2023-12-13
Payer: MEDICAID

## 2023-12-13 ENCOUNTER — OFFICE VISIT (OUTPATIENT)
Dept: MATERNAL FETAL MEDICINE | Facility: CLINIC | Age: 23
End: 2023-12-13
Payer: MEDICAID

## 2023-12-13 VITALS
SYSTOLIC BLOOD PRESSURE: 115 MMHG | HEIGHT: 66 IN | BODY MASS INDEX: 32.47 KG/M2 | WEIGHT: 202 LBS | DIASTOLIC BLOOD PRESSURE: 76 MMHG | HEART RATE: 92 BPM

## 2023-12-13 DIAGNOSIS — O99.012 ANEMIA DURING PREGNANCY IN SECOND TRIMESTER: ICD-10-CM

## 2023-12-13 DIAGNOSIS — F31.9 BIPOLAR DISEASE DURING PREGNANCY IN SECOND TRIMESTER: ICD-10-CM

## 2023-12-13 DIAGNOSIS — O09.299 H/O SHOULDER DYSTOCIA IN PRIOR PREGNANCY, CURRENTLY PREGNANT: ICD-10-CM

## 2023-12-13 DIAGNOSIS — G40.909 SEIZURE DISORDER DURING PREGNANCY IN SECOND TRIMESTER: ICD-10-CM

## 2023-12-13 DIAGNOSIS — G40.909 SEIZURE DISORDER DURING PREGNANCY IN SECOND TRIMESTER: Primary | ICD-10-CM

## 2023-12-13 DIAGNOSIS — O09.90 AT HIGH RISK FOR COMPLICATIONS OF INTRAUTERINE PREGNANCY (IUP): ICD-10-CM

## 2023-12-13 DIAGNOSIS — O99.352 SEIZURE DISORDER DURING PREGNANCY IN SECOND TRIMESTER: Primary | ICD-10-CM

## 2023-12-13 DIAGNOSIS — O99.342 BIPOLAR DISEASE DURING PREGNANCY IN SECOND TRIMESTER: ICD-10-CM

## 2023-12-13 DIAGNOSIS — O99.212 OBESITY AFFECTING PREGNANCY IN SECOND TRIMESTER, UNSPECIFIED OBESITY TYPE: ICD-10-CM

## 2023-12-13 DIAGNOSIS — O24.112 PRE-EXISTING TYPE 2 DIABETES MELLITUS DURING PREGNANCY IN SECOND TRIMESTER: ICD-10-CM

## 2023-12-13 DIAGNOSIS — O99.352 SEIZURE DISORDER DURING PREGNANCY IN SECOND TRIMESTER: ICD-10-CM

## 2023-12-13 DIAGNOSIS — O09.292 HX OF PREECLAMPSIA, PRIOR PREGNANCY, CURRENTLY PREGNANT, SECOND TRIMESTER: ICD-10-CM

## 2023-12-13 PROCEDURE — 99214 OFFICE O/P EST MOD 30 MIN: CPT | Mod: TH,S$GLB,, | Performed by: OBSTETRICS & GYNECOLOGY

## 2023-12-13 PROCEDURE — 99214 PR OFFICE/OUTPT VISIT, EST, LEVL IV, 30-39 MIN: ICD-10-PCS | Mod: TH,S$GLB,, | Performed by: OBSTETRICS & GYNECOLOGY

## 2023-12-13 PROCEDURE — 3008F BODY MASS INDEX DOCD: CPT | Mod: CPTII,S$GLB,, | Performed by: OBSTETRICS & GYNECOLOGY

## 2023-12-13 PROCEDURE — 3078F DIAST BP <80 MM HG: CPT | Mod: CPTII,S$GLB,, | Performed by: OBSTETRICS & GYNECOLOGY

## 2023-12-13 PROCEDURE — 1159F PR MEDICATION LIST DOCUMENTED IN MEDICAL RECORD: ICD-10-PCS | Mod: CPTII,S$GLB,, | Performed by: OBSTETRICS & GYNECOLOGY

## 2023-12-13 PROCEDURE — 76816 OB US FOLLOW-UP PER FETUS: CPT | Mod: S$GLB,,, | Performed by: OBSTETRICS & GYNECOLOGY

## 2023-12-13 PROCEDURE — 1159F MED LIST DOCD IN RCRD: CPT | Mod: CPTII,S$GLB,, | Performed by: OBSTETRICS & GYNECOLOGY

## 2023-12-13 PROCEDURE — 3074F PR MOST RECENT SYSTOLIC BLOOD PRESSURE < 130 MM HG: ICD-10-PCS | Mod: CPTII,S$GLB,, | Performed by: OBSTETRICS & GYNECOLOGY

## 2023-12-13 PROCEDURE — 3078F PR MOST RECENT DIASTOLIC BLOOD PRESSURE < 80 MM HG: ICD-10-PCS | Mod: CPTII,S$GLB,, | Performed by: OBSTETRICS & GYNECOLOGY

## 2023-12-13 PROCEDURE — 3008F PR BODY MASS INDEX (BMI) DOCUMENTED: ICD-10-PCS | Mod: CPTII,S$GLB,, | Performed by: OBSTETRICS & GYNECOLOGY

## 2023-12-13 PROCEDURE — 76816 PR  US,PREGNANT UTERUS,F/U,TRANSABD APP: ICD-10-PCS | Mod: S$GLB,,, | Performed by: OBSTETRICS & GYNECOLOGY

## 2023-12-13 PROCEDURE — 3044F HG A1C LEVEL LT 7.0%: CPT | Mod: CPTII,S$GLB,, | Performed by: OBSTETRICS & GYNECOLOGY

## 2023-12-13 PROCEDURE — 3044F PR MOST RECENT HEMOGLOBIN A1C LEVEL <7.0%: ICD-10-PCS | Mod: CPTII,S$GLB,, | Performed by: OBSTETRICS & GYNECOLOGY

## 2023-12-13 PROCEDURE — 3074F SYST BP LT 130 MM HG: CPT | Mod: CPTII,S$GLB,, | Performed by: OBSTETRICS & GYNECOLOGY

## 2023-12-13 RX ORDER — SYRING-NEEDL,DISP,INSUL,0.3 ML 30 GX5/16"
SYRINGE, EMPTY DISPOSABLE MISCELLANEOUS
COMMUNITY
Start: 2023-12-06 | End: 2024-01-17 | Stop reason: SDUPTHER

## 2023-12-13 NOTE — PROGRESS NOTES
Maternal Fetal Medicine Follow Up Consult    Subjective     Patient ID: [id number]    Chief Complaint: M follow up with US (Seizure disorder and Type 2 Diabetes.)      HPI: Norma Denise is a 23 y.o. adult  at 24w4d gestation with Estimated Date of Delivery: 3/30/24  who is here for follow  up consultation by M.  She has type 2 diabetes, diagnosed as a child. She is on 10 units of NPH in the morning,  and 10 units of NPH at bedtime. On 10/19/2023, hemoglobin A1c was 5.5.  Patient has a fetal echocardiogram scheduled for 2023.  TSH was 1.36. She has history of shoulder dystocia in her last pregnancy.  Baby weighed 8 lb 4 oz and she reports had some nerve damage to 1 arm and does physical therapy.  She had preeclampsia in her last pregnancy.  She is taking low-dose aspirin daily.  She has history of seizure disorder, diagnosed in childhood.  She is currently on Keppra 500 mg twice daily after report of seizure earlier in October.  She is also on folic acid 1 mg daily.  She has an appointment with Neurology on 2023 with Dr. High.  She has increased BMI of 31 at consult visit.  She is on low-dose aspirin twice daily.  She has history of bipolar disorder and depression and is on Lexapro 10 mg daily.  She has history of anemia and is on oral hematinic therapy.  On 10/19/2023, H&H 9.2/30.4.  She was given order to do iron studies consult visit which she has not done yet.  She is accompanied by her partner today         Interval history since last M visit: None.. She denies any leaking fluid, vaginal bleeding, contractions, decreased fetal movement. Denies headaches, visual disturbances, or epigastric pain.    Pregnancy complications include:   Patient Active Problem List   Diagnosis    Bipolar disease during pregnancy in second trimester    Pre-existing type 2 diabetes mellitus during pregnancy in second trimester    Seizure disorder during pregnancy in second trimester    High-risk pregnancy in  "second trimester    H/O shoulder dystocia in prior pregnancy, currently pregnant    Hx of preeclampsia, prior pregnancy, currently pregnant, second trimester    At high risk for complications of intrauterine pregnancy (IUP)    Increased BMI affecting pregnancy in second trimester    Anemia during pregnancy in second trimester        No changes to medical, surgical, family, social, or obstetric history.    Medications:  Current Outpatient Medications   Medication Instructions    albuterol (PROVENTIL/VENTOLIN HFA) 90 mcg/actuation inhaler 1-2 puffs, Inhalation, Every 6 hours PRN, Rescue    aspirin (ECOTRIN) 162 mg, Oral, Daily, Start at 12 weeks gestation.  At 36 weeks gestation, decrease to one 81mg tablet daily.    blood sugar diagnostic (TRUE METRIX GLUCOSE TEST STRIP) Strp USE TO TEST BLOOD GLUCOSE AS DIRECTED 6-8 X/DAY    EScitalopram oxalate (LEXAPRO) 10 mg, Oral, Daily    folic acid (FOLVITE) 1 mg, Oral, Daily    insulin NPH (HUMULIN N NPH U-100 INSULIN) 100 unit/mL injection Inject 10 units in AM and 10 units at bedtime.  OK to substitute    insulin syringe-needle U-100 0.5 mL 31 gauge x 5/16" Syrg 1 Syringe, Misc.(Non-Drug; Combo Route), 2 times daily    levETIRAcetam (KEPPRA) 500 mg, Oral, 2 times daily    mirtazapine (REMERON) 30 mg, Oral, Nightly    ondansetron (ZOFRAN-ODT) 4 mg, Oral, Every 6 hours PRN    prenatal vit no.124-iron-folic (PRENATAL VITAMIN) 27 mg iron- 800 mcg Tab 1 tablet, Oral, Daily    TRUE METRIX GLUCOSE METER Misc USE TO CHECK BLOOD GLUCOSE FOUR TIMES DAILY.    TRUEPLUS INSULIN 1 mL 31 gauge x 5/16 Syrg SMARTSIG:Injection Twice Daily    TRUEPLUS LANCETS 33 gauge Misc USE TO CHECK BLOOD GLUCOSE FOUR TIMES DAILY.    VITAMIN C 250 mg, Oral, 2 times daily       Review of Systems   12 point review of systems conducted, negative except as stated in the history of present illness. See HPI for details.      Objective     Visit Vitals  /76 (BP Location: Left arm, Patient Position: Sitting, " "BP Method: Large (Automatic))   Pulse 92   Ht 5' 6" (1.676 m)   Wt 91.6 kg (202 lb)   LMP 2023 (Exact Date)   BMI 32.60 kg/m²        Physical Exam  Vitals and nursing note reviewed.   Constitutional:       Appearance: Normal appearance.      Comments: Increased BMI   HENT:      Head: Normocephalic and atraumatic.      Nose: Nose normal. No congestion.   Cardiovascular:      Rate and Rhythm: Normal rate.   Pulmonary:      Effort: Pulmonary effort is normal.   Skin:     Findings: No rash.   Neurological:      Mental Status: Arlen Denise is alert and oriented to person, place, and time.   Psychiatric:         Mood and Affect: Mood normal.         Behavior: Behavior normal.         Thought Content: Thought content normal.         Judgment: Judgment normal.           ASSESSMENT/PLAN:     23 y.o.  female with IUP at 24w4d    Type 2 diabetes mellitus in pregnancy  There is upper normal fetal growth with an EFW of 857 g at the 68% and the AC at the 84% on 2023.  AFV is normal.     Risks associated with diabetes in pregnancy include higher risk for polyhydramnios, fetal macrosomia and  metabolic complications (hypoglycemia, hyperbilirubinemia, hypocalcemia, erythema).     Continue 2200 calorie ADA diet during pregnancy. It is recommended that she have 30 grams of carbohydrates with breakfast, and 60 grams with lunch and dinner. In addition, she should have three snacks in between meals with 15 grams of carbohydrates and at bedtime with 30 grams of carbohydrates.    Log reviewed.  Patient advised to adjust dose of insulin to 10 units of NPH in the morning and 16 units of NPH at bedtime.    She was advised to keep her blood sugars checks and to call me weekly with her blood sugars. The need for strict blood sugar control with goals of treatment to keep pre-prandial blood sugars between 65 and 90 and 1 hr postprandial blood sugars less than 140 was reviewed.     Low dose aspirin as discussed.    Will " plan to recheck fetal growth every 4-5 weeks. Fetal testing  could be started in this setting around 30 weeks gestation .She needs to do fetal kick counts throughout the pregnancy starting at 24 weeks.     She has order to do 24 hour urine, advised to do as soon as possible.    Keep scheduled fetal echocardiogram on 2023      History of shoulder dystocia in previous pregnancy, antepartum  It would be reasonable to consider a primary  if EFW is more than 8 lb (previous baby was 8 lb 4 oz), understanding the degree of error of ultrasound with potential lesser or higher actual fetal weight. Recommend limiting weight gain in pregnancy and adequate sugar control, which are risk factors for recurrence.      Seizure disorder in pregnancy  I reviewed with her the association of seizure disorder with higher risk of anomalies even without any use of medication. I discussed that the risk of seizure outweigh any risk of medication and recommend her to continue current medication regimen per neurology (Keppra 50 mg twice daily), and continue Folic acid 1 mg daily. If she has any seizures, she needs to report that immediately.  It is also recommended to avoid driving or operating heavy/hazardous equipment until 6 months post last seizure.    There are risks to the fetus from maternal seizures. Fetal hypoxia may occur as a result of decreased placental blood flow or postictal apnea and there are risks of injury to the fetus, abruption, or miscarriage due to maternal trauma sustained during a seizure.    Additionally, the higher risk of fetal growth restriction with seizure disorder was previously addressed.  I recommend serial ultrasounds to monitor fetal growth. I recommend doing fetal kick counts throughout the second and third trimester. If evidence of fetal growth restriction or other complications occur, then more formal fetal surveillance will be needed.      Increased BMI in pregnancy  Body mass index  is 32.6 kg/m². With mild excessive weight gain of 4 lb since last visit, 2 weeks ago, she was advised to decrease caloric intake and follow diabetic diet.  Excess weight gain would be associated with gestational hypertension, gestational diabetes and adverse  outcomes, including fetal demise in utero.    With risk factors associated with increased BMI, she is to do fetal kick counts throughout the pregnancy (after 24 weeks).    It is important to lose weight after the pregnancy is over, especially before a future pregnancy was discussed. Breastfeeding may be an important tool in reducing the postpartum weight retention. Fetal risks were discussed with short term risk of fetal/ obesity and long term risk of adolescent component of metabolic syndrome.      History of preeclampsia  With increased risk for recurrence, it was agreed to continue asa 81 mg BID until 34 6/7 weeks, then decrease to once daily until delivery.. Preeclampsia precautions reviewed.      Bipolar disorder in pregnancy  Reviewed risks with depression and risks/benefits of medication use in pregnancy.    Although discontinuing the medication for a few weeks before delivery  has been suggested, to avoid  withdrawal, the potential risks to mom and lack of proven benefit from this approach, makes continuing medication till delivery a reasonable option.With symptom control, reasonable to continue Lexapro 10 mg daily.     She was also advised to report any worsening of symptoms or SI/HI tendencies immediately to provider/ER for prompt intervention. She was advised to continue follow up care with her Mental Health provider.        Anemia in pregnancy  Anemia in pregnancy is associated with a 2-fold increased risk for  delivery and 3-fold increased risk for delivering a low birth weight baby.      Continue current supplementation. Recommend intermittent H/H throughout the pregnancy to assess response to supplementation and  guide titration of dosing.     Reminded to do iron studies previously ordered.  Patient was advised to go do the iron studies today.  Also ordered a follow-up CBC.    Patient stated that she will go today, 12/13/2023, do the blood test      Follow up in about 2 weeks (around 12/27/2023) for MFM follow-up, Send sugars weekly.     Future Appointments   Date Time Provider Department Center   12/14/2023  2:20 PM Isma Payne MD [facility]   12/27/2023  1:15 PM Zhanna High MD [facility]        YES[facility] involvement: Patient was evaluated and examined by Dr. Juárez. JAYCE Chan, helped in pre charting of part of note.    Components of this note were documented using voice recognition systems and are subject to errors not corrected at proofreading. Please contact the author for any clarifications.

## 2023-12-14 ENCOUNTER — HOSPITAL ENCOUNTER (OUTPATIENT)
Facility: HOSPITAL | Age: 23
Discharge: HOME | End: 2023-12-15
Attending: STUDENT IN AN ORGANIZED HEALTH CARE EDUCATION/TRAINING PROGRAM | Admitting: STUDENT IN AN ORGANIZED HEALTH CARE EDUCATION/TRAINING PROGRAM
Payer: MEDICAID

## 2023-12-14 DIAGNOSIS — O24.119 TYPE 2 DIABETES MELLITUS AFFECTING PREGNANCY, ANTEPARTUM: ICD-10-CM

## 2023-12-14 LAB
ABORH RETYPE: NORMAL
ANISOCYTOSIS BLD QL SMEAR: ABNORMAL
BASOPHILS # BLD AUTO: 0.06 X10(3)/MCL
BASOPHILS NFR BLD AUTO: 0.5 %
EOSINOPHIL # BLD AUTO: 0.17 X10(3)/MCL (ref 0–0.9)
EOSINOPHIL NFR BLD AUTO: 1.5 %
ERYTHROCYTE [DISTWIDTH] IN BLOOD BY AUTOMATED COUNT: 17.9 % (ref 11.5–17)
GROUP & RH: NORMAL
HCT VFR BLD AUTO: 25.2 % (ref 42–52)
HGB BLD-MCNC: 7.5 G/DL (ref 14–18)
IMM GRANULOCYTES # BLD AUTO: 0.15 X10(3)/MCL (ref 0–0.04)
IMM GRANULOCYTES NFR BLD AUTO: 1.4 %
INDIRECT COOMBS GEL: NORMAL
IRON SATN MFR SERPL: 4 % (ref 20–50)
IRON SERPL-MCNC: 21 UG/DL
LYMPHOCYTES # BLD AUTO: 2.17 X10(3)/MCL (ref 0.6–4.6)
LYMPHOCYTES NFR BLD AUTO: 19.5 %
MCH RBC QN AUTO: 20.7 PG (ref 27–31)
MCHC RBC AUTO-ENTMCNC: 29.8 G/DL (ref 33–36)
MCV RBC AUTO: 69.4 FL (ref 80–94)
MICROCYTES BLD QL SMEAR: ABNORMAL
MONOCYTES # BLD AUTO: 0.67 X10(3)/MCL (ref 0.1–1.3)
MONOCYTES NFR BLD AUTO: 6 %
NEUTROPHILS # BLD AUTO: 7.89 X10(3)/MCL (ref 2.1–9.2)
NEUTROPHILS NFR BLD AUTO: 71.1 %
NRBC BLD AUTO-RTO: 0.5 %
PLATELET # BLD AUTO: 369 X10(3)/MCL (ref 130–400)
PLATELET # BLD EST: ABNORMAL 10*3/UL
PMV BLD AUTO: 9 FL (ref 7.4–10.4)
POCT GLUCOSE: 104 MG/DL (ref 70–110)
POCT GLUCOSE: 111 MG/DL (ref 70–110)
RBC # BLD AUTO: 3.63 X10(6)/MCL
RBC MORPH BLD: ABNORMAL
RUBELLA IMMUNE STATUS: NORMAL
SPECIMEN OUTDATE: NORMAL
T PALLIDUM AB SER QL: NONREACTIVE
TIBC SERPL-MCNC: 452 UG/DL
TIBC SERPL-MCNC: 473 UG/DL (ref 250–450)
TRANSFERRIN SERPL-MCNC: 484 MG/DL
WBC # SPEC AUTO: 11.11 X10(3)/MCL (ref 4.5–11.5)

## 2023-12-14 PROCEDURE — 85025 COMPLETE CBC W/AUTO DIFF WBC: CPT | Performed by: STUDENT IN AN ORGANIZED HEALTH CARE EDUCATION/TRAINING PROGRAM

## 2023-12-14 PROCEDURE — G0379 DIRECT REFER HOSPITAL OBSERV: HCPCS

## 2023-12-14 PROCEDURE — 63600175 PHARM REV CODE 636 W HCPCS: Performed by: STUDENT IN AN ORGANIZED HEALTH CARE EDUCATION/TRAINING PROGRAM

## 2023-12-14 PROCEDURE — 86780 TREPONEMA PALLIDUM: CPT | Performed by: STUDENT IN AN ORGANIZED HEALTH CARE EDUCATION/TRAINING PROGRAM

## 2023-12-14 PROCEDURE — G0378 HOSPITAL OBSERVATION PER HR: HCPCS

## 2023-12-14 PROCEDURE — 86850 RBC ANTIBODY SCREEN: CPT | Performed by: STUDENT IN AN ORGANIZED HEALTH CARE EDUCATION/TRAINING PROGRAM

## 2023-12-14 PROCEDURE — 83540 ASSAY OF IRON: CPT | Performed by: STUDENT IN AN ORGANIZED HEALTH CARE EDUCATION/TRAINING PROGRAM

## 2023-12-14 PROCEDURE — 87389 HIV-1 AG W/HIV-1&-2 AB AG IA: CPT | Performed by: STUDENT IN AN ORGANIZED HEALTH CARE EDUCATION/TRAINING PROGRAM

## 2023-12-14 PROCEDURE — 96372 THER/PROPH/DIAG INJ SC/IM: CPT | Performed by: STUDENT IN AN ORGANIZED HEALTH CARE EDUCATION/TRAINING PROGRAM

## 2023-12-14 PROCEDURE — 25000003 PHARM REV CODE 250: Performed by: STUDENT IN AN ORGANIZED HEALTH CARE EDUCATION/TRAINING PROGRAM

## 2023-12-14 RX ORDER — LEVETIRACETAM 500 MG/1
500 TABLET ORAL 2 TIMES DAILY
Status: DISCONTINUED | OUTPATIENT
Start: 2023-12-14 | End: 2023-12-15 | Stop reason: HOSPADM

## 2023-12-14 RX ORDER — FOLIC ACID 1 MG/1
1 TABLET ORAL DAILY
Status: DISCONTINUED | OUTPATIENT
Start: 2023-12-15 | End: 2023-12-15 | Stop reason: HOSPADM

## 2023-12-14 RX ORDER — ESCITALOPRAM OXALATE 10 MG/1
10 TABLET ORAL DAILY
Status: DISCONTINUED | OUTPATIENT
Start: 2023-12-15 | End: 2023-12-15 | Stop reason: HOSPADM

## 2023-12-14 RX ORDER — NAPROXEN SODIUM 220 MG/1
81 TABLET, FILM COATED ORAL DAILY
Status: DISCONTINUED | OUTPATIENT
Start: 2023-12-15 | End: 2023-12-15 | Stop reason: HOSPADM

## 2023-12-14 RX ADMIN — INSULIN HUMAN 16 UNITS: 100 INJECTION, SUSPENSION SUBCUTANEOUS at 09:12

## 2023-12-14 RX ADMIN — SODIUM CHLORIDE 125 MG: 9 INJECTION, SOLUTION INTRAVENOUS at 08:12

## 2023-12-14 RX ADMIN — LEVETIRACETAM 500 MG: 500 TABLET, FILM COATED ORAL at 09:12

## 2023-12-15 VITALS
OXYGEN SATURATION: 100 % | DIASTOLIC BLOOD PRESSURE: 52 MMHG | TEMPERATURE: 98 F | RESPIRATION RATE: 18 BRPM | HEART RATE: 85 BPM | SYSTOLIC BLOOD PRESSURE: 101 MMHG

## 2023-12-15 LAB
GLUCOSE P FAST SERPL-MCNC: 109 MG/DL (ref 70–100)
GLUCOSE SERPL-MCNC: 109 MG/DL (ref 74–100)
HIV 1+2 AB+HIV1 P24 AG SERPL QL IA: NONREACTIVE
POCT GLUCOSE: 109 MG/DL (ref 70–110)
POCT GLUCOSE: 129 MG/DL (ref 70–110)
POCT GLUCOSE: 149 MG/DL (ref 70–110)

## 2023-12-15 PROCEDURE — G0378 HOSPITAL OBSERVATION PER HR: HCPCS

## 2023-12-15 PROCEDURE — 82947 ASSAY GLUCOSE BLOOD QUANT: CPT | Performed by: STUDENT IN AN ORGANIZED HEALTH CARE EDUCATION/TRAINING PROGRAM

## 2023-12-15 PROCEDURE — 96372 THER/PROPH/DIAG INJ SC/IM: CPT | Performed by: STUDENT IN AN ORGANIZED HEALTH CARE EDUCATION/TRAINING PROGRAM

## 2023-12-15 PROCEDURE — 82947 ASSAY GLUCOSE BLOOD QUANT: CPT | Mod: 91 | Performed by: STUDENT IN AN ORGANIZED HEALTH CARE EDUCATION/TRAINING PROGRAM

## 2023-12-15 PROCEDURE — 63600175 PHARM REV CODE 636 W HCPCS: Performed by: STUDENT IN AN ORGANIZED HEALTH CARE EDUCATION/TRAINING PROGRAM

## 2023-12-15 PROCEDURE — 25000003 PHARM REV CODE 250: Performed by: STUDENT IN AN ORGANIZED HEALTH CARE EDUCATION/TRAINING PROGRAM

## 2023-12-15 RX ADMIN — LEVETIRACETAM 500 MG: 500 TABLET, FILM COATED ORAL at 09:12

## 2023-12-15 RX ADMIN — SODIUM CHLORIDE 125 MG: 9 INJECTION, SOLUTION INTRAVENOUS at 09:12

## 2023-12-15 RX ADMIN — INSULIN HUMAN 10 UNITS: 100 INJECTION, SUSPENSION SUBCUTANEOUS at 07:12

## 2023-12-15 RX ADMIN — ASPIRIN 81 MG CHEWABLE TABLET 81 MG: 81 TABLET CHEWABLE at 09:12

## 2023-12-15 RX ADMIN — ESCITALOPRAM OXALATE 10 MG: 10 TABLET ORAL at 09:12

## 2023-12-15 RX ADMIN — FOLIC ACID 1 MG: 1 TABLET ORAL at 09:12

## 2023-12-15 NOTE — DISCHARGE SUMMARY
Discharge Summary  Antepartum      Primary OB Clinician: Isma Payne MD    Discharge Provider: Isma Payne MD    Admission date: 2023  Discharge date: 12/15/2023     Admit Dx:  Patient Active Problem List   Diagnosis    Bipolar disease during pregnancy in second trimester    Pre-existing type 2 diabetes mellitus during pregnancy in second trimester    Seizure disorder during pregnancy in second trimester    High-risk pregnancy in second trimester    H/O shoulder dystocia in prior pregnancy, currently pregnant    Hx of preeclampsia, prior pregnancy, currently pregnant, second trimester    At high risk for complications of intrauterine pregnancy (IUP)    Increased BMI affecting pregnancy in second trimester    Anemia during pregnancy in second trimester        Discharge Dx:    Same    Procedure: accuchecks, NST, IV iron    Hospital Course:  Norma Denise is a 23 y.o. now  who was admitted on 2023 for observation for elevated glucose.     Her antepartum course was uncomplicated. Labs reassuring. EFM reassuring.  On the date of discharge, Pt in stable condition and ready for discharge. Glucose acceptable for outpatient therapy./    Disposition: To home, self care    Follow Up: 1 weeks to email glucose logs    Patient Instructions:   Standard OB precautions, with modifications for any above problems

## 2023-12-15 NOTE — DISCHARGE INSTRUCTIONS
Please come back to labor and delivery with any vaginal bleeding, leaking fluids or decreased fetal movement or contractions. Follow up with your MD as scheduled.

## 2023-12-18 NOTE — H&P
Ante - History & Physical    Chief Complaint: high glucose     HPI:      Norma Denise is a 23 y.o.  at 25w2d who presents today for evaluation of above chief complaint.       Complains of nothing. Doing well. Does not have glucose log. Reports compliance with insulin       No chief complaint on file.       Patient's last menstrual period was 2023 (exact date).     OB History    Para Term  AB Living   3 1 1 0 1 1   SAB IAB Ectopic Multiple Live Births   1 0 0   1      # Outcome Date GA Lbr Fantasma/2nd Weight Sex Delivery Anes PTL Lv   3 Current            2 Term 2022 38w0d  3.742 kg (8 lb 4 oz) M Vag-Spont EPI N CRESENCIO   1 SAB 21     SAB   FD       Past Medical History:   Diagnosis Date    Adjustment disorder     Anemia     BEGAN WITH PREGNANCY OF LAST BABY, CURRENT    Anxiety     Asthma     CURRENT    Bipolar disorder 2008    Depression     Diabetes mellitus 2023    TYPE 2 DIABETIC    History of psychiatric hospitalization     Hx of psychiatric care     Hypertension     ECLAMPSIA WITH LABOR    Psychiatric exam requested by authority     Psychiatric problem     Seizure disorder 2002    DIAGNOSED WHEN BORN    Sleep difficulties     Substance abuse     BEEN SOBER FOR 2 YEARS IN FEBRUARY    Suicide attempt     HAPPENS OFTEN, LAST ATTEMPT WAS        Past Surgical History:   Procedure Laterality Date    NO PAST SURGERIES         Family History   Problem Relation Age of Onset    Bipolar disorder Mother     Schizophrenia Mother     No Known Problems Father     Bipolar disorder Sister     Schizophrenia Sister     No Known Problems Brother     No Known Problems Maternal Aunt     No Known Problems Paternal Aunt     No Known Problems Maternal Uncle     No Known Problems Paternal Uncle     No Known Problems Maternal Grandfather     No Known Problems Maternal Grandmother     No Known Problems Paternal Grandfather     No Known Problems Paternal  Grandmother     No Known Problems Cousin        Social History     Socioeconomic History    Marital status:     Number of children: 0   Occupational History    Occupation: unemployed   Tobacco Use    Smoking status: Former     Types: Vaping with nicotine, Cigarettes     Start date: 2020     Quit date:      Years since quittin.9     Passive exposure: Current    Smokeless tobacco: Never   Substance and Sexual Activity    Alcohol use: Not Currently    Drug use: Not Currently     Frequency: 21.0 times per week     Types: Marijuana, Heroin    Sexual activity: Yes     Partners: Male   Other Topics Concern    Patient feels they ought to cut down on drinking/drug use No    Patient annoyed by others criticizing their drinking/drug use No    Patient has felt bad or guilty about drinking/drug use No    Patient has had a drink/used drugs as an eye opener in the AM No   Social History Narrative    ** Merged History Encounter **         Pt is a 21 year old female who recently broke up with her  Boyfriend, whom she had been living with.   Pt reports that she does not attend school nor does she have a job. Pt states that she completed 11 th grade.       No current facility-administered medications for this encounter.     Current Outpatient Medications   Medication Sig Dispense Refill    albuterol (PROVENTIL/VENTOLIN HFA) 90 mcg/actuation inhaler Inhale 1-2 puffs into the lungs every 6 (six) hours as needed for Wheezing. Rescue 6.7 g 3    ascorbic acid, vitamin C, (VITAMIN C) 500 mg TbSR Take 250 mg by mouth 2 (two) times a day. 60 each 3    aspirin (ECOTRIN) 81 MG EC tablet Take 2 tablets (162 mg total) by mouth once daily. Start at 12 weeks gestation.  At 36 weeks gestation, decrease to one 81mg tablet daily. 60 tablet 6    blood sugar diagnostic (TRUE METRIX GLUCOSE TEST STRIP) Strp USE TO TEST BLOOD GLUCOSE AS DIRECTED 6-8 X/DAY      EScitalopram oxalate (LEXAPRO) 10 MG tablet Take 1 tablet (10 mg total) by  "mouth once daily. 30 tablet 1    folic acid (FOLVITE) 1 MG tablet Take 1 tablet (1 mg total) by mouth once daily. 30 tablet 11    insulin NPH (HUMULIN N NPH U-100 INSULIN) 100 unit/mL injection Inject 10 units in AM and 10 units at bedtime.  OK to substitute 10 mL 3    insulin syringe-needle U-100 0.5 mL 31 gauge x 5/16" Syrg 1 Syringe by Misc.(Non-Drug; Combo Route) route 2 (two) times daily. 60 each 3    levETIRAcetam (KEPPRA) 500 MG Tab Take 1 tablet (500 mg total) by mouth 2 (two) times daily. 60 tablet 11    mirtazapine (REMERON) 30 MG tablet Take 1 tablet (30 mg total) by mouth every evening. 30 tablet 1    ondansetron (ZOFRAN-ODT) 4 MG TbDL Take 1 tablet (4 mg total) by mouth every 6 (six) hours as needed (nausea). 40 tablet 1    prenatal vit no.124-iron-folic (PRENATAL VITAMIN) 27 mg iron- 800 mcg Tab Take 1 tablet by mouth once daily at 6am. 30 tablet 10    TRUE METRIX GLUCOSE METER Misc USE TO CHECK BLOOD GLUCOSE FOUR TIMES DAILY.      TRUEPLUS INSULIN 1 mL 31 gauge x 5/16 Syrg SMARTSIG:Injection Twice Daily      TRUEPLUS LANCETS 33 gauge Misc USE TO CHECK BLOOD GLUCOSE FOUR TIMES DAILY.         Review of patient's allergies indicates:  No Known Allergies      Physical Exam:      BP (!) 101/52   Pulse 85   Temp 98.1 °F (36.7 °C) (Oral)   Resp 18   LMP 07/01/2023 (Exact Date)   SpO2 100%   There is no height or weight on file to calculate BMI.     APPEARANCE: Well nourished, well developed, in no acute distress.  PSYCH: Appropriate mood and affect.  CHEST: Normal respiratory effort,  CV: Normal capillary refill.  ABDOMEN: Soft.  No tenderness or masses.    PELVIC:  deferred   EXTREMITIES: No edema       Results:         Assessment/Plan:     Active Problem List with Overview Notes    Diagnosis Date Noted    Hx of preeclampsia, prior pregnancy, currently pregnant, second trimester 11/01/2023    At high risk for complications of intrauterine pregnancy (IUP) 11/01/2023    Increased BMI affecting pregnancy " in second trimester 11/01/2023    Anemia during pregnancy in second trimester 11/01/2023    Seizure disorder during pregnancy in second trimester 10/19/2023    High-risk pregnancy in second trimester 10/19/2023    H/O shoulder dystocia in prior pregnancy, currently pregnant 10/19/2023    Pre-existing type 2 diabetes mellitus during pregnancy in second trimester 07/13/2023    Bipolar disease during pregnancy in second trimester 08/25/2021      Accucheck 214, 1hr after apple, and ramen at 0800 today.   After elevated glucose in clinic, sent for obs for glucose monitoring    Isma Payne MD  12/18/2023 12:29 PM

## 2023-12-22 NOTE — PROGRESS NOTES
[facility] Neurology Initial Office Visit Note    Initial Visit Date: 12/27/2023  Current Visit Date:  12/27/2023    Chief Complaint:     Chief Complaint   Patient presents with    Seizures     Patient states she has been having seizures since her childhood. Sometimes her seizures are convulsive and at times she still stares off and nobody can get her attention and she is non responsive. She has seizures in her sleep as well. She states her last seizure was around Halloween, patient does not remember them but fiance is there when she has them.  Patient is currently 26 weeks pregnant, she is due in March of 2024.        History of Present Illness:      This is 23 y.o. right hand dominant female currently 26 weeks gestation, bipolar disorder, DM II who is referred for seizure disorder.    Age of Onset : childhood    Semiology:   nocturnal: jerking motion back to back lasting up to 1 hour   Behavioral arrest, lasting up to arms flailing, head with variable movement, eyes closed, lasting up to 20 minutes. Can occur back to back.     Frequency: variable frequency. Cluster of events in 10/2023 due to stressors in life.      Provocation Factors: stress     Risk Factors  - Family history of seizure disorders:  Yes paternal grandmother, father, niece with seizure disorder.   - History of focal CNS lesions: Not Applicable  - History of CNS infections: No  - History head trauma with prolonged LOC: No  - History of childhood seizures, including febrile seizures: Yes as above.   - History of development delay: Yes learning disability.    - History of underlying mood disorder: Yes bipolar disorder    - History of sleep disorder: No  - Recreational drug use: No  - Alcohol use: No  - Family planning and contraceptive use: Yes due date 3/30/2024      Medications:     Current Anti-Seizure Medication(s):  Levetiracetam 500 mg twice a day (10/2023 to present)    Prior Anti-Seizure Medication(s):  Unknown    Surgical Intervention & Devices:      - VNS:  - DBS  - RNS:  - Lobectomy:    Labs:     Results for orders placed or performed during the hospital encounter of 12/14/23   HIV 1/2 Ag/Ab (4th Gen)   Result Value Ref Range    HIV Nonreactive Nonreactive   SYPHILIS ANTIBODY (WITH REFLEX RPR)   Result Value Ref Range    Syphilis Antibody Nonreactive Nonreactive, Equivocal   Iron Panel   Result Value Ref Range    Iron Binding Capacity Unsaturated 452 ug/dL    Iron Level 21 ug/dL    Transferrin 484 mg/dL    Iron Binding Capacity Total 473 (H) 250 - 450 ug/dL    Iron Saturation 4 (L) 20 - 50 %   CBC with Differential   Result Value Ref Range    WBC 11.11 4.50 - 11.50 x10(3)/mcL    RBC 3.63 x10(6)/mcL    Hgb 7.5 (L) 14.0 - 18.0 g/dL    Hct 25.2 (L) 42.0 - 52.0 %    MCV 69.4 (L) 80.0 - 94.0 fL    MCH 20.7 (L) 27.0 - 31.0 pg    MCHC 29.8 (L) 33.0 - 36.0 g/dL    RDW 17.9 (H) 11.5 - 17.0 %    Platelet 369 130 - 400 x10(3)/mcL    MPV 9.0 7.4 - 10.4 fL    Neut % 71.1 %    Lymph % 19.5 %    Mono % 6.0 %    Eos % 1.5 %    Basophil % 0.5 %    Lymph # 2.17 0.6 - 4.6 x10(3)/mcL    Neut # 7.89 2.1 - 9.2 x10(3)/mcL    Mono # 0.67 0.1 - 1.3 x10(3)/mcL    Eos # 0.17 0 - 0.9 x10(3)/mcL    Baso # 0.06 <=0.2 x10(3)/mcL    IG# 0.15 (H) 0 - 0.04 x10(3)/mcL    IG% 1.4 %    NRBC% 0.5 %   Blood Smear Microscopic Exam   Result Value Ref Range    RBC Morph Abnormal (A) Normal    Anisocytosis 1+ (A) (none)    Microcytosis 1+ (A) (none)    Platelets Increased (A) Normal, Adequate   Glucose, Fasting   Result Value Ref Range    Glucose Fasting 109 (H) 70 - 100 mg/dL   Glucose, Random   Result Value Ref Range    Glucose Level 109 (H) 74 - 100 mg/dL   Type & Screen   Result Value Ref Range    Group & Rh B POS     Indirect Eve GEL NEG     Specimen Outdate 12/17/2023 23:59    ABORH RETYPE   Result Value Ref Range    ABORH Retype B POS    POCT glucose   Result Value Ref Range    POCT Glucose 104 70 - 110 mg/dL   POCT glucose   Result Value Ref Range    POCT Glucose 111 (H) 70 - 110 mg/dL    POCT glucose   Result Value Ref Range    POCT Glucose 129 (H) 70 - 110 mg/dL   POCT glucose   Result Value Ref Range    POCT Glucose 109 70 - 110 mg/dL   POCT glucose   Result Value Ref Range    POCT Glucose 149 (H) 70 - 110 mg/dL       Studies:      - routine EEG 9/19/2022 at [facility]:  This is a normal EEG without evidence of focal or  epileptiform activity as far as the artifact-obscured recording allows  interpretation    - one hour EEG:   - 24 hour EEG:  - Ambulatory EEG:  - EMU Video EEG:  - MRI Brain:   - NCHCT:  - WADA:    Review of Systems:     Review of Systems   All other systems reviewed and are negative.      Physical Exams:     Vitals:    12/27/23 1247   BP: 111/78   Pulse: 104   Temp: 98.2 °F (36.8 °C)       Physical Exam  Vitals and nursing note reviewed.   Constitutional:       Appearance: Normal appearance.   HENT:      Head: Normocephalic and atraumatic.      Nose: Nose normal.      Mouth/Throat:      Mouth: Mucous membranes are moist.      Pharynx: Oropharynx is clear.   Eyes:      Conjunctiva/sclera: Conjunctivae normal.   Cardiovascular:      Rate and Rhythm: Normal rate and regular rhythm.      Pulses: Normal pulses.      Heart sounds: Normal heart sounds.   Pulmonary:      Effort: Pulmonary effort is normal.      Breath sounds: Normal breath sounds.   Abdominal:      General: Abdomen is flat.      Palpations: Abdomen is soft.   Musculoskeletal:         General: Normal range of motion.      Cervical back: Normal range of motion.   Neurological:      Mental Status: Arlen A Woodland is alert.   Psychiatric:         Mood and Affect: Mood normal.         Comprehensive Neurological Exam:  Mental Status: Alert Oriented to Self, Date, and Place.  Comprehension wnl. No dysarthria.   CN II - XII: BRANDEN, No APD, VFFC, No ptosis OU, EOMI without nystagmus, LT/Temp symmetric in CN V1-3 distribution, Hearing grossly intact, Face Symmetric, Tongue and Uvula midline, Trapezius symmetric bilateral.   Motor: tone  and bulk wnl throughout, no abnormal involuntary or voluntary movements, 5/5 to confrontation, Fine finger movements wnl b/l, No pronator drift.   Sensory: LT, Proprioception, Vibration, PP, Temp symmetric. No sensory simultagnosia.   Reflexes: 2+ throughout, plantar reflexes downward bilateral.   Cerebellar: FNF wnl b/l, RAHM wnl b/l  Romberg: Negative  Gait: normal. Heel Gait, Toe Gait, Tandem Gait wnl.     Assessment:     This is 23 y.o. 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 Patient had previously seen Dr. Dallas Carey at [facility].     Problem List Items Addressed This Visit          Neuro    Seizure disorder during pregnancy in second trimester    Relevant Medications    levETIRAcetam (KEPPRA) 500 MG Tab     Other Visit Diagnoses       Localization-related (focal) (partial) idiopathic epilepsy and epileptic syndromes with seizures of localized onset, not intractable, without status epilepticus    -  Primary    Relevant Orders    Levetiracetam Level    Seizures                Plan:     [] continue with Levetiracetam 500 mg twice a day  [] Keppra AM trough tomorrow    RTC 2 months telemedicine     Seizure education provided: including family planning and teratogenicity of AEDs, risk of uncontrolled seizure disorder, SUDEP. No driving until seizure free for six months ([place] state law). No swimming, climbing heights, or operate heavy machinery without supervision. Patient is advised to avoid Benadryl, Tramadol, Wellbutrin, flashing lights, alcohol, sleep deprivation, and certain anti-biotics that can lower seizure threshold.     I have explained the treatment plan, diagnosis, and prognosis to patient. All questions are answered to the best of my knowledge.     Face to face time 45 minutes, including documentation, chart review, counseling, education, review of test results, relevant medical  records, and coordination of care.   I have explained the treatment plan, diagnosis, and prognosis to patient. All questions are answered to the best of my knowledge.         Zhanna High MD   General Neurology  12/27/2023

## 2023-12-27 ENCOUNTER — OFFICE VISIT (OUTPATIENT)
Dept: NEUROLOGY | Facility: CLINIC | Age: 23
End: 2023-12-27
Payer: MEDICAID

## 2023-12-27 VITALS
BODY MASS INDEX: 31.71 KG/M2 | DIASTOLIC BLOOD PRESSURE: 78 MMHG | HEIGHT: 67 IN | SYSTOLIC BLOOD PRESSURE: 111 MMHG | TEMPERATURE: 98 F | HEART RATE: 104 BPM | OXYGEN SATURATION: 98 % | WEIGHT: 202 LBS

## 2023-12-27 DIAGNOSIS — R56.9 SEIZURES: ICD-10-CM

## 2023-12-27 DIAGNOSIS — G40.009 LOCALIZATION-RELATED (FOCAL) (PARTIAL) IDIOPATHIC EPILEPSY AND EPILEPTIC SYNDROMES WITH SEIZURES OF LOCALIZED ONSET, NOT INTRACTABLE, WITHOUT STATUS EPILEPTICUS: Primary | ICD-10-CM

## 2023-12-27 DIAGNOSIS — F44.5 FUNCTIONAL NEUROLOGICAL SYMPTOM DISORDER WITH ATTACKS OR SEIZURES: ICD-10-CM

## 2023-12-27 DIAGNOSIS — O99.352 SEIZURE DISORDER DURING PREGNANCY IN SECOND TRIMESTER: ICD-10-CM

## 2023-12-27 DIAGNOSIS — G40.909 SEIZURE DISORDER DURING PREGNANCY IN SECOND TRIMESTER: ICD-10-CM

## 2023-12-27 PROCEDURE — 1159F MED LIST DOCD IN RCRD: CPT | Mod: CPTII,,, | Performed by: PSYCHIATRY & NEUROLOGY

## 2023-12-27 PROCEDURE — 99214 OFFICE O/P EST MOD 30 MIN: CPT | Mod: PBBFAC | Performed by: PSYCHIATRY & NEUROLOGY

## 2023-12-27 PROCEDURE — 99204 OFFICE O/P NEW MOD 45 MIN: CPT | Mod: S$PBB,,, | Performed by: PSYCHIATRY & NEUROLOGY

## 2023-12-27 PROCEDURE — 3008F BODY MASS INDEX DOCD: CPT | Mod: CPTII,,, | Performed by: PSYCHIATRY & NEUROLOGY

## 2023-12-27 PROCEDURE — 1159F PR MEDICATION LIST DOCUMENTED IN MEDICAL RECORD: ICD-10-PCS | Mod: CPTII,,, | Performed by: PSYCHIATRY & NEUROLOGY

## 2023-12-27 PROCEDURE — 3044F HG A1C LEVEL LT 7.0%: CPT | Mod: CPTII,,, | Performed by: PSYCHIATRY & NEUROLOGY

## 2023-12-27 PROCEDURE — 3008F PR BODY MASS INDEX (BMI) DOCUMENTED: ICD-10-PCS | Mod: CPTII,,, | Performed by: PSYCHIATRY & NEUROLOGY

## 2023-12-27 PROCEDURE — 99204 PR OFFICE/OUTPT VISIT, NEW, LEVL IV, 45-59 MIN: ICD-10-PCS | Mod: S$PBB,,, | Performed by: PSYCHIATRY & NEUROLOGY

## 2023-12-27 PROCEDURE — 3074F SYST BP LT 130 MM HG: CPT | Mod: CPTII,,, | Performed by: PSYCHIATRY & NEUROLOGY

## 2023-12-27 PROCEDURE — 3078F PR MOST RECENT DIASTOLIC BLOOD PRESSURE < 80 MM HG: ICD-10-PCS | Mod: CPTII,,, | Performed by: PSYCHIATRY & NEUROLOGY

## 2023-12-27 PROCEDURE — 3044F PR MOST RECENT HEMOGLOBIN A1C LEVEL <7.0%: ICD-10-PCS | Mod: CPTII,,, | Performed by: PSYCHIATRY & NEUROLOGY

## 2023-12-27 PROCEDURE — 3074F PR MOST RECENT SYSTOLIC BLOOD PRESSURE < 130 MM HG: ICD-10-PCS | Mod: CPTII,,, | Performed by: PSYCHIATRY & NEUROLOGY

## 2023-12-27 PROCEDURE — 3078F DIAST BP <80 MM HG: CPT | Mod: CPTII,,, | Performed by: PSYCHIATRY & NEUROLOGY

## 2023-12-27 RX ORDER — B-COMPLEX WITH VITAMIN C
TABLET ORAL
COMMUNITY
Start: 2023-12-14

## 2023-12-27 RX ORDER — LEVETIRACETAM 500 MG/1
500 TABLET ORAL 2 TIMES DAILY
Qty: 60 TABLET | Refills: 3 | Status: SHIPPED | OUTPATIENT
Start: 2023-12-27 | End: 2024-02-18 | Stop reason: SDUPTHER

## 2024-01-03 ENCOUNTER — OFFICE VISIT (OUTPATIENT)
Dept: MATERNAL FETAL MEDICINE | Facility: CLINIC | Age: 24
End: 2024-01-03
Payer: MEDICAID

## 2024-01-03 ENCOUNTER — TELEPHONE (OUTPATIENT)
Dept: MATERNAL FETAL MEDICINE | Facility: CLINIC | Age: 24
End: 2024-01-03

## 2024-01-03 VITALS
DIASTOLIC BLOOD PRESSURE: 74 MMHG | BODY MASS INDEX: 30.76 KG/M2 | WEIGHT: 196 LBS | HEIGHT: 67 IN | SYSTOLIC BLOOD PRESSURE: 109 MMHG | HEART RATE: 84 BPM

## 2024-01-03 DIAGNOSIS — O99.012 ANEMIA DURING PREGNANCY IN SECOND TRIMESTER: ICD-10-CM

## 2024-01-03 DIAGNOSIS — O24.112 PRE-EXISTING TYPE 2 DIABETES MELLITUS DURING PREGNANCY IN SECOND TRIMESTER: ICD-10-CM

## 2024-01-03 DIAGNOSIS — O99.342 BIPOLAR DISEASE DURING PREGNANCY IN SECOND TRIMESTER: ICD-10-CM

## 2024-01-03 DIAGNOSIS — O09.292 HX OF PREECLAMPSIA, PRIOR PREGNANCY, CURRENTLY PREGNANT, SECOND TRIMESTER: ICD-10-CM

## 2024-01-03 DIAGNOSIS — O99.212 OBESITY AFFECTING PREGNANCY IN SECOND TRIMESTER, UNSPECIFIED OBESITY TYPE: ICD-10-CM

## 2024-01-03 DIAGNOSIS — O09.92 HIGH-RISK PREGNANCY IN SECOND TRIMESTER: ICD-10-CM

## 2024-01-03 DIAGNOSIS — G40.909 SEIZURE DISORDER DURING PREGNANCY IN SECOND TRIMESTER: Primary | ICD-10-CM

## 2024-01-03 DIAGNOSIS — O99.352 SEIZURE DISORDER DURING PREGNANCY IN SECOND TRIMESTER: Primary | ICD-10-CM

## 2024-01-03 DIAGNOSIS — F31.9 BIPOLAR DISEASE DURING PREGNANCY IN SECOND TRIMESTER: ICD-10-CM

## 2024-01-03 DIAGNOSIS — O09.90 AT HIGH RISK FOR COMPLICATIONS OF INTRAUTERINE PREGNANCY (IUP): ICD-10-CM

## 2024-01-03 PROCEDURE — 3008F BODY MASS INDEX DOCD: CPT | Mod: CPTII,S$GLB,, | Performed by: OBSTETRICS & GYNECOLOGY

## 2024-01-03 PROCEDURE — 99214 OFFICE O/P EST MOD 30 MIN: CPT | Mod: TH,S$GLB,, | Performed by: OBSTETRICS & GYNECOLOGY

## 2024-01-03 PROCEDURE — 3078F DIAST BP <80 MM HG: CPT | Mod: CPTII,S$GLB,, | Performed by: OBSTETRICS & GYNECOLOGY

## 2024-01-03 PROCEDURE — 3074F SYST BP LT 130 MM HG: CPT | Mod: CPTII,S$GLB,, | Performed by: OBSTETRICS & GYNECOLOGY

## 2024-01-03 PROCEDURE — 1159F MED LIST DOCD IN RCRD: CPT | Mod: CPTII,S$GLB,, | Performed by: OBSTETRICS & GYNECOLOGY

## 2024-01-03 NOTE — PROGRESS NOTES
Maternal Fetal Medicine Follow Up    Subjective     Patient ID: [id number]    Chief Complaint: MFM follow up       HPI: Norma Denise is a 23 y.o. adult  at 27w4d gestation with Estimated Date of Delivery: 3/30/24  who is here for follow  up consultation by M.    She has type 2 diabetes, diagnosed as a child. She is on 10 units of NPH in the morning,  and 16 units of NPH at bedtime. On 10/19/2023, hemoglobin A1c was 5.5.  Patient had a normal fetal echocardiogram on 2023. She has history of shoulder dystocia in her last pregnancy.  Baby weighed 8 lb 4 oz and she reports had some nerve damage to 1 arm and does physical therapy.  She had preeclampsia in her last pregnancy.  She is taking low-dose aspirin daily.  She has history of seizure disorder, diagnosed in childhood.  She is currently on Keppra 500 mg twice daily after report of seizure earlier in October.  She is also on folic acid 1 mg daily.  She had an appointment with Neurology on 2023 with Dr. High and was advised to continue Keppra 500 mg b.i.d. and folic acid daily..  She has increased BMI of 31 at consult visit.  She is on low-dose aspirin twice daily.  She has history of bipolar disorder and depression and is on Lexapro 10 mg daily.  She has history of anemia and is on oral hematinic therapy.  On 10/19/2023, H&H 9.2/30.4.  On 2023, iron level was 21, TIBC was 473, H&H 7.5/25.2, significantly decreased from previous assessment.    She was sent to the hospital on 12/15/2023 for observation due to very elevated blood sugar with discrepancy between Accu-Chek and reported blood sugar values.  Since she reports does not her dose of insulin was adjusted. She was given 2 doses of IV Ferrlecit due to significant iron deficiency anemia.      Interval history since last [facility] visit: None.. She denies any leaking fluid, vaginal bleeding, contractions, decreased fetal movement. Denies headaches, visual disturbances, or epigastric  "pain.    Pregnancy complications include:   Patient Active Problem List   Diagnosis    Bipolar disease during pregnancy in second trimester    Pre-existing type 2 diabetes mellitus during pregnancy in second trimester    Seizure disorder during pregnancy in second trimester    High-risk pregnancy in second trimester    H/O shoulder dystocia in prior pregnancy, currently pregnant    Hx of preeclampsia, prior pregnancy, currently pregnant, second trimester    At high risk for complications of intrauterine pregnancy (IUP)    Increased BMI affecting pregnancy in second trimester    Anemia during pregnancy in second trimester    Functional neurological symptom disorder with attacks or seizures    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    Medications:  Current Outpatient Medications   Medication Instructions    albuterol (PROVENTIL/VENTOLIN HFA) 90 mcg/actuation inhaler 1-2 puffs, Inhalation, Every 6 hours PRN, Rescue    aspirin (ECOTRIN) 162 mg, Oral, Daily, Start at 12 weeks gestation.  At 36 weeks gestation, decrease to one 81mg tablet daily.    blood sugar diagnostic (TRUE METRIX GLUCOSE TEST STRIP) Strp USE TO TEST BLOOD GLUCOSE AS DIRECTED 6-8 X/DAY    EScitalopram oxalate (LEXAPRO) 10 mg, Oral, Daily    folic acid (FOLVITE) 1 mg, Oral, Daily    insulin NPH (HUMULIN N NPH U-100 INSULIN) 100 unit/mL injection Inject 10 units in AM and 10 units at bedtime.  OK to substitute    insulin syringe-needle U-100 0.5 mL 31 gauge x 5/16" Syrg 1 Syringe, Misc.(Non-Drug; Combo Route), 2 times daily    levETIRAcetam (KEPPRA) 500 mg, Oral, 2 times daily    mirtazapine (REMERON) 30 mg, Oral, Nightly    ondansetron (ZOFRAN-ODT) 4 mg, Oral, Every 6 hours PRN    prenatal vit no.124-iron-folic (PRENATAL VITAMIN) 27 mg iron- 800 mcg Tab 1 " "tablet, Oral, Daily    PRENATAL VITAMIN 27 mg iron- 0.8 mg Tab     TRUE METRIX GLUCOSE METER Misc USE TO CHECK BLOOD GLUCOSE FOUR TIMES DAILY.    TRUEPLUS INSULIN 1 mL 31 gauge x 5/16 Syrg SMARTSIG:Injection Twice Daily    TRUEPLUS LANCETS 33 gauge Misc USE TO CHECK BLOOD GLUCOSE FOUR TIMES DAILY.    VITAMIN C 250 mg, Oral, 2 times daily       Review of Systems   12 point review of systems conducted, negative except as stated in the history of present illness. See HPI for details.      Objective     Visit Vitals  /74 (BP Location: Left arm, Patient Position: Sitting, BP Method: Large (Automatic))   Pulse 84   Ht 5' 7.2" (1.707 m)   Wt 88.9 kg (196 lb)   LMP 2023 (Exact Date)   BMI 30.52 kg/m²        Physical Exam  Vitals and nursing note reviewed.   Constitutional:       Appearance: Normal appearance.      Comments: Increased BMI   HENT:      Head: Normocephalic and atraumatic.      Nose: Nose normal. No congestion.   Cardiovascular:      Rate and Rhythm: Normal rate.   Pulmonary:      Effort: Pulmonary effort is normal.   Skin:     Findings: No rash.   Neurological:      Mental Status: Arlen Denise is alert and oriented to person, place, and time.   Psychiatric:         Mood and Affect: Mood normal.         Behavior: Behavior normal.         Thought Content: Thought content normal.         Judgment: Judgment normal.           ASSESSMENT/PLAN:     23 y.o.  female with IUP at 27w4d    Type 2 diabetes mellitus in pregnancy  There is upper normal fetal growth with an EFW of 857 g at the 68% and the AC at the 84% on 2023.  AFV is normal.      per Doppler today.     Risks associated with diabetes in pregnancy include higher risk for polyhydramnios, fetal macrosomia and  metabolic complications (hypoglycemia, hyperbilirubinemia, hypocalcemia, erythema).     Continue 2200 calorie ADA diet during pregnancy. It is recommended that she have 30 grams of carbohydrates with breakfast, and " 60 grams with lunch and dinner. In addition, she should have three snacks in between meals with 15 grams of carbohydrates and at bedtime with 30 grams of carbohydrates.    Log reviewed.  Patient advised to adjust dose of insulin to 8 units of NPH in the morning and 22 units of NPH at bedtime.  Added a prescription for 1 unit of Humalog for every 8 mg of sugar over 140 in view of the scattered elevated blood sugars postprandials.    She was advised to keep her blood sugars checks and to call me weekly with her blood sugars. The need for strict blood sugar control with goals of treatment to keep pre-prandial blood sugars between 65 and 90 and 1 hr postprandial blood sugars less than 140 was reviewed.     Low dose aspirin as discussed.    Will plan to recheck fetal growth every 4-5 weeks. Fetal testing  could be started in this setting around 30 weeks gestation .She needs to do fetal kick counts throughout the pregnancy starting at 24 weeks.       History of shoulder dystocia in previous pregnancy, antepartum  It would be reasonable to consider a primary  if EFW is more than 8 lb (previous baby was 8 lb 4 oz), understanding the degree of error of ultrasound with potential lesser or higher actual fetal weight. Recommend limiting weight gain in pregnancy and adequate sugar control, which are risk factors for recurrence.      Seizure disorder in pregnancy  I reviewed with her the association of seizure disorder with higher risk of anomalies even without any use of medication. I discussed that the risk of seizure outweigh any risk of medication and recommend her to continue current medication regimen per neurology (Keppra 50 mg twice daily), and continue Folic acid 1 mg daily. If she has any seizures, she needs to report that immediately.  It is also recommended to avoid driving or operating heavy/hazardous equipment until 6 months post last seizure.    There are risks to the fetus from maternal seizures. Fetal  hypoxia may occur as a result of decreased placental blood flow or postictal apnea and there are risks of injury to the fetus, abruption, or miscarriage due to maternal trauma sustained during a seizure.    Additionally, the higher risk of fetal growth restriction with seizure disorder was previously addressed.  I recommend serial ultrasounds to monitor fetal growth. I recommend doing fetal kick counts throughout the second and third trimester. If evidence of fetal growth restriction or other complications occur, then more formal fetal surveillance will be needed.      Increased BMI in pregnancy  Body mass index is 30.52 kg/m².  With fluctuating weight but overall stable since November, continue healthy low caloric diet and follow diabetic diet.  Excess weight gain would be associated with gestational hypertension, gestational diabetes and adverse  outcomes, including fetal demise in utero.    With risk factors associated with increased BMI, she is to do fetal kick counts throughout the pregnancy (after 24 weeks).    It is important to lose weight after the pregnancy is over, especially before a future pregnancy was discussed. Breastfeeding may be an important tool in reducing the postpartum weight retention. Fetal risks were discussed with short term risk of fetal/ obesity and long term risk of adolescent component of metabolic syndrome.      History of preeclampsia  With increased risk for recurrence, it was agreed to continue asa 81 mg BID until 34 6/7 weeks, then decrease to once daily until delivery.. Preeclampsia precautions reviewed.      Bipolar disorder in pregnancy  Reviewed risks with depression and risks/benefits of medication use in pregnancy.    Although discontinuing the medication for a few weeks before delivery  has been suggested, to avoid  withdrawal, the potential risks to mom and lack of proven benefit from this approach, makes continuing medication till delivery a  reasonable option.With symptom control, reasonable to continue Lexapro 10 mg daily.     She was also advised to report any worsening of symptoms or SI/HI tendencies immediately to provider/ER for prompt intervention. She was advised to continue follow up care with her Mental Health provider.        Anemia in pregnancy  Anemia in pregnancy is associated with a 2-fold increased risk for  delivery and 3-fold increased risk for delivering a low birth weight baby.      Continue current supplementation. Recommend intermittent H/H throughout the pregnancy to assess response to supplementation and guide titration of dosing.    Was significantly worsened H&H, order the follow-up CBC to see if the 2 doses were sufficient and if not enough improved, we could give additional IV iron.  She will continue oral hematinic therapy at this time.      Fasting blood sugars are elevated in the afternoon are a little bit low I adjusted the dose of insulin as above and added rescue Humalog.  Follow-up CBC ordered.  She will continue taking her seizure medication and folic acid.  Continue hematinic therapy.  Continue aspirin b.i.d..  No follow-ups on file.     Future Appointments   Date Time Provider Department Center   2024  2:15 PM Isma Payne MD [facility]   3/11/2024  2:00 PM Zhanna High MD [facility] NEURO [facility]        YES[facility] involvement: Patient was evaluated by JAYCE Chan and Dr. Juárez.  Final assessment and recommendations as stated above were made by Dr. Juárez.    This note was created with the assistance of [employer] voice recognition software. There may be transcription errors as a result of using this technology, however minimal. Effort has been made to ensure accuracy of transcription, but any obvious errors or omissions should be clarified with the author of the document.

## 2024-01-09 DIAGNOSIS — O24.112 PRE-EXISTING TYPE 2 DIABETES MELLITUS DURING PREGNANCY IN SECOND TRIMESTER: ICD-10-CM

## 2024-01-09 DIAGNOSIS — O99.012 ANEMIA DURING PREGNANCY IN SECOND TRIMESTER: ICD-10-CM

## 2024-01-09 DIAGNOSIS — G40.009 LOCALIZATION-RELATED (FOCAL) (PARTIAL) IDIOPATHIC EPILEPSY AND EPILEPTIC SYNDROMES WITH SEIZURES OF LOCALIZED ONSET, NOT INTRACTABLE, WITHOUT STATUS EPILEPTICUS: ICD-10-CM

## 2024-01-09 DIAGNOSIS — F44.5 FUNCTIONAL NEUROLOGICAL SYMPTOM DISORDER WITH ATTACKS OR SEIZURES: ICD-10-CM

## 2024-01-09 DIAGNOSIS — O09.90 AT HIGH RISK FOR COMPLICATIONS OF INTRAUTERINE PREGNANCY (IUP): ICD-10-CM

## 2024-01-09 DIAGNOSIS — O99.342 BIPOLAR DISEASE DURING PREGNANCY IN SECOND TRIMESTER: ICD-10-CM

## 2024-01-09 DIAGNOSIS — G40.909 SEIZURE DISORDER DURING PREGNANCY IN SECOND TRIMESTER: Primary | ICD-10-CM

## 2024-01-09 DIAGNOSIS — F31.9 BIPOLAR DISEASE DURING PREGNANCY IN SECOND TRIMESTER: ICD-10-CM

## 2024-01-09 DIAGNOSIS — O99.352 SEIZURE DISORDER DURING PREGNANCY IN SECOND TRIMESTER: Primary | ICD-10-CM

## 2024-01-17 ENCOUNTER — OFFICE VISIT (OUTPATIENT)
Dept: MATERNAL FETAL MEDICINE | Facility: CLINIC | Age: 24
End: 2024-01-17
Payer: MEDICAID

## 2024-01-17 ENCOUNTER — PROCEDURE VISIT (OUTPATIENT)
Dept: MATERNAL FETAL MEDICINE | Facility: CLINIC | Age: 24
End: 2024-01-17
Payer: MEDICAID

## 2024-01-17 ENCOUNTER — LAB VISIT (OUTPATIENT)
Dept: LAB | Facility: HOSPITAL | Age: 24
End: 2024-01-17
Attending: OBSTETRICS & GYNECOLOGY
Payer: MEDICAID

## 2024-01-17 VITALS
HEART RATE: 109 BPM | BODY MASS INDEX: 31.71 KG/M2 | SYSTOLIC BLOOD PRESSURE: 127 MMHG | WEIGHT: 202 LBS | DIASTOLIC BLOOD PRESSURE: 80 MMHG | HEIGHT: 67 IN

## 2024-01-17 DIAGNOSIS — F31.9 BIPOLAR DISEASE DURING PREGNANCY IN THIRD TRIMESTER: ICD-10-CM

## 2024-01-17 DIAGNOSIS — O99.343 BIPOLAR DISEASE DURING PREGNANCY IN THIRD TRIMESTER: ICD-10-CM

## 2024-01-17 DIAGNOSIS — O99.353 SEIZURE DISORDER DURING PREGNANCY IN THIRD TRIMESTER: Primary | ICD-10-CM

## 2024-01-17 DIAGNOSIS — O09.299 H/O SHOULDER DYSTOCIA IN PRIOR PREGNANCY, CURRENTLY PREGNANT: ICD-10-CM

## 2024-01-17 DIAGNOSIS — O99.213 OBESITY AFFECTING PREGNANCY IN THIRD TRIMESTER, UNSPECIFIED OBESITY TYPE: ICD-10-CM

## 2024-01-17 DIAGNOSIS — O09.90 AT HIGH RISK FOR COMPLICATIONS OF INTRAUTERINE PREGNANCY (IUP): ICD-10-CM

## 2024-01-17 DIAGNOSIS — O99.342 BIPOLAR DISEASE DURING PREGNANCY IN SECOND TRIMESTER: ICD-10-CM

## 2024-01-17 DIAGNOSIS — O99.352 SEIZURE DISORDER DURING PREGNANCY IN SECOND TRIMESTER: ICD-10-CM

## 2024-01-17 DIAGNOSIS — G40.909 SEIZURE DISORDER DURING PREGNANCY IN SECOND TRIMESTER: ICD-10-CM

## 2024-01-17 DIAGNOSIS — G40.909 SEIZURE DISORDER DURING PREGNANCY IN THIRD TRIMESTER: Primary | ICD-10-CM

## 2024-01-17 DIAGNOSIS — O24.113 PRE-EXISTING TYPE 2 DIABETES MELLITUS DURING PREGNANCY IN THIRD TRIMESTER: ICD-10-CM

## 2024-01-17 DIAGNOSIS — G40.009 LOCALIZATION-RELATED (FOCAL) (PARTIAL) IDIOPATHIC EPILEPSY AND EPILEPTIC SYNDROMES WITH SEIZURES OF LOCALIZED ONSET, NOT INTRACTABLE, WITHOUT STATUS EPILEPTICUS: ICD-10-CM

## 2024-01-17 DIAGNOSIS — O99.013 ANEMIA DURING PREGNANCY IN THIRD TRIMESTER: ICD-10-CM

## 2024-01-17 DIAGNOSIS — O24.112 PRE-EXISTING TYPE 2 DIABETES MELLITUS DURING PREGNANCY IN SECOND TRIMESTER: ICD-10-CM

## 2024-01-17 DIAGNOSIS — O16.3 ELEVATED BLOOD PRESSURE AFFECTING PREGNANCY IN THIRD TRIMESTER, ANTEPARTUM: ICD-10-CM

## 2024-01-17 DIAGNOSIS — O09.293 HX OF PREECLAMPSIA, PRIOR PREGNANCY, CURRENTLY PREGNANT, THIRD TRIMESTER: ICD-10-CM

## 2024-01-17 DIAGNOSIS — O24.113 PRE-EXISTING TYPE 2 DIABETES MELLITUS DURING PREGNANCY IN THIRD TRIMESTER: Primary | ICD-10-CM

## 2024-01-17 DIAGNOSIS — F44.5 FUNCTIONAL NEUROLOGICAL SYMPTOM DISORDER WITH ATTACKS OR SEIZURES: ICD-10-CM

## 2024-01-17 DIAGNOSIS — F31.9 BIPOLAR DISEASE DURING PREGNANCY IN SECOND TRIMESTER: ICD-10-CM

## 2024-01-17 DIAGNOSIS — O99.012 ANEMIA DURING PREGNANCY IN SECOND TRIMESTER: ICD-10-CM

## 2024-01-17 PROBLEM — O09.92 HIGH-RISK PREGNANCY IN SECOND TRIMESTER: Status: RESOLVED | Noted: 2023-10-19 | Resolved: 2024-01-17

## 2024-01-17 LAB
ALT SERPL-CCNC: 9 UNIT/L (ref 0–55)
AST SERPL-CCNC: 12 UNIT/L (ref 5–34)
CREAT SERPL-MCNC: 0.62 MG/DL
ERYTHROCYTE [DISTWIDTH] IN BLOOD BY AUTOMATED COUNT: 21.1 % (ref 11.5–17)
GFR SERPLBLD CREATININE-BSD FMLA CKD-EPI: >60 MLS/MIN/1.73/M2
HCT VFR BLD AUTO: 29.2 % (ref 42–52)
HGB BLD-MCNC: 8.5 G/DL (ref 14–18)
LDH SERPL-CCNC: 161 U/L (ref 125–220)
MCH RBC QN AUTO: 21.4 PG (ref 27–31)
MCHC RBC AUTO-ENTMCNC: 29.1 G/DL (ref 33–36)
MCV RBC AUTO: 73.6 FL (ref 80–94)
NRBC BLD AUTO-RTO: 0.4 %
PLATELET # BLD AUTO: 402 X10(3)/MCL (ref 130–400)
PMV BLD AUTO: 8.8 FL (ref 7.4–10.4)
RBC # BLD AUTO: 3.97 X10(6)/MCL
URATE SERPL-MCNC: 4 MG/DL
WBC # SPEC AUTO: 12.67 X10(3)/MCL (ref 4.5–11.5)

## 2024-01-17 PROCEDURE — 85027 COMPLETE CBC AUTOMATED: CPT

## 2024-01-17 PROCEDURE — 1159F MED LIST DOCD IN RCRD: CPT | Mod: CPTII,S$GLB,, | Performed by: OBSTETRICS & GYNECOLOGY

## 2024-01-17 PROCEDURE — 84450 TRANSFERASE (AST) (SGOT): CPT

## 2024-01-17 PROCEDURE — 99214 OFFICE O/P EST MOD 30 MIN: CPT | Mod: TH,S$GLB,, | Performed by: OBSTETRICS & GYNECOLOGY

## 2024-01-17 PROCEDURE — 3079F DIAST BP 80-89 MM HG: CPT | Mod: CPTII,S$GLB,, | Performed by: OBSTETRICS & GYNECOLOGY

## 2024-01-17 PROCEDURE — 76816 OB US FOLLOW-UP PER FETUS: CPT | Mod: S$GLB,,, | Performed by: OBSTETRICS & GYNECOLOGY

## 2024-01-17 PROCEDURE — 3008F BODY MASS INDEX DOCD: CPT | Mod: CPTII,S$GLB,, | Performed by: OBSTETRICS & GYNECOLOGY

## 2024-01-17 PROCEDURE — 76819 FETAL BIOPHYS PROFIL W/O NST: CPT | Mod: S$GLB,,, | Performed by: OBSTETRICS & GYNECOLOGY

## 2024-01-17 PROCEDURE — 36415 COLL VENOUS BLD VENIPUNCTURE: CPT

## 2024-01-17 PROCEDURE — 3074F SYST BP LT 130 MM HG: CPT | Mod: CPTII,S$GLB,, | Performed by: OBSTETRICS & GYNECOLOGY

## 2024-01-17 PROCEDURE — 84460 ALANINE AMINO (ALT) (SGPT): CPT

## 2024-01-17 PROCEDURE — 84550 ASSAY OF BLOOD/URIC ACID: CPT

## 2024-01-17 PROCEDURE — 82565 ASSAY OF CREATININE: CPT

## 2024-01-17 PROCEDURE — 83615 LACTATE (LD) (LDH) ENZYME: CPT

## 2024-01-17 RX ORDER — SYRING-NEEDL,DISP,INSUL,0.3 ML 30 GX5/16"
SYRINGE, EMPTY DISPOSABLE MISCELLANEOUS
Qty: 120 EACH | Refills: 3 | Status: SHIPPED | OUTPATIENT
Start: 2024-01-17 | End: 2024-06-10

## 2024-01-17 RX ORDER — INSULIN LISPRO 100 [IU]/ML
INJECTION, SOLUTION INTRAVENOUS; SUBCUTANEOUS
Qty: 10 ML | Refills: 3 | Status: ON HOLD | OUTPATIENT
Start: 2024-01-17 | End: 2024-03-08 | Stop reason: HOSPADM

## 2024-01-17 NOTE — PROGRESS NOTES
Maternal Fetal Medicine Follow Up    Subjective     Patient ID: [id number]    Chief Complaint: MFM follow up with US (Seizure disorder and Type 2 Diabetes.  /)      HPI: Norma Denies is a 23 y.o. adult  at 29w4d gestation with Estimated Date of Delivery: 3/30/24  who is here for follow  up consultation by M.    She has type 2 diabetes, diagnosed as a child. She is on insulin, 8 units of NPH in the morning,  and 22 units of NPH at bedtime. She has corrective formula of 1 unit of Humalog for every 8 mg over 140. On 10/19/2023, hemoglobin A1c was 5.5.  Patient had a normal fetal echocardiogram on 2023. She has history of shoulder dystocia in her last pregnancy.  Baby weighed 8 lb 4 oz and she reports had some nerve damage to 1 arm and does physical therapy.  She had preeclampsia in her last pregnancy.  She is taking low-dose aspirin daily.  She has history of seizure disorder, diagnosed in childhood.  She is currently on Keppra 500 mg twice daily after report of seizure earlier in October.  She is also on folic acid 1 mg daily.  She had an appointment with Neurology on 2023 with Dr. High and was advised to continue Keppra 500 mg b.i.d. and folic acid daily..  She has increased BMI of 31 at consult visit.  She is on low-dose aspirin twice daily.  She has history of bipolar disorder and depression and is on Lexapro 10 mg daily.  She has history of anemia and is on oral hematinic therapy.  On 10/19/2023, H&H 9.2/30.4.  On 2023, iron level was 21, TIBC was 473, H&H 7.5/25.2, significantly decreased from previous assessment. She is s/p 2 doses of IV Ferrlecit due to significant iron deficiency anemia.      Interval history since last MFM visit: None.. She denies any leaking fluid, vaginal bleeding, contractions, decreased fetal movement. Denies headaches, visual disturbances, or epigastric pain.    Pregnancy complications include:   Patient Active Problem List   Diagnosis    Bipolar disease  during pregnancy in third trimester    Pre-existing type 2 diabetes mellitus during pregnancy in third trimester    Seizure disorder during pregnancy in third trimester    H/O shoulder dystocia in prior pregnancy, currently pregnant    Hx of preeclampsia, prior pregnancy, currently pregnant, third trimester    At high risk for complications of intrauterine pregnancy (IUP)    Increased BMI affecting pregnancy in third trimester    Anemia during pregnancy in third trimester    Functional neurological symptom disorder with attacks or seizures    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    Medications:  Current Outpatient Medications   Medication Instructions    albuterol (PROVENTIL/VENTOLIN HFA) 90 mcg/actuation inhaler 1-2 puffs, Inhalation, Every 6 hours PRN, Rescue    aspirin (ECOTRIN) 162 mg, Oral, Daily, Start at 12 weeks gestation.  At 36 weeks gestation, decrease to one 81mg tablet daily.    blood sugar diagnostic (TRUE METRIX GLUCOSE TEST STRIP) Strp USE TO TEST BLOOD GLUCOSE AS DIRECTED 6-8 X/DAY    EScitalopram oxalate (LEXAPRO) 10 mg, Oral, Daily    folic acid (FOLVITE) 1 mg, Oral, Daily    insulin NPH (HUMULIN N NPH U-100 INSULIN) 100 unit/mL injection Inject 10 units in AM and 10 units at bedtime.  OK to substitute    levETIRAcetam (KEPPRA) 500 mg, Oral, 2 times daily    mirtazapine (REMERON) 30 mg, Oral, Nightly    ondansetron (ZOFRAN-ODT) 4 mg, Oral, Every 6 hours PRN    prenatal vit no.124-iron-folic (PRENATAL VITAMIN) 27 mg iron- 800 mcg Tab 1 tablet, Oral, Daily    PRENATAL VITAMIN 27 mg iron- 0.8 mg Tab     TRUE METRIX GLUCOSE METER Misc USE TO CHECK BLOOD GLUCOSE FOUR TIMES DAILY.    TRUEPLUS INSULIN 1 mL 31 gauge x 5/16 Syrg SMARTSIG:Injection Twice Daily    TRUEPLUS LANCETS 33 gauge Misc USE TO CHECK  "BLOOD GLUCOSE FOUR TIMES DAILY.       Review of Systems   12 point review of systems conducted, negative except as stated in the history of present illness. See HPI for details.      Objective     Visit Vitals  /80   Pulse 109   Ht 5' 7" (1.702 m)   Wt 91.6 kg (202 lb)   LMP 2023 (Exact Date)   BMI 31.64 kg/m²        Physical Exam  Vitals and nursing note reviewed.   Constitutional:       Appearance: Normal appearance.      Comments: Increased BMI   HENT:      Head: Normocephalic and atraumatic.      Nose: Nose normal. No congestion.   Cardiovascular:      Rate and Rhythm: Normal rate.   Pulmonary:      Effort: Pulmonary effort is normal.   Skin:     Findings: No rash.   Neurological:      Mental Status: Arlen BRYSON Beverly is alert and oriented to person, place, and time.   Psychiatric:         Mood and Affect: Mood normal.         Behavior: Behavior normal.         Thought Content: Thought content normal.         Judgment: Judgment normal.           ASSESSMENT/PLAN:     23 y.o.  female with IUP at 29w4d    Type 2 diabetes mellitus in pregnancy  There is upper normal fetal growth with an EFW of 1772 g at the 62% and the AC at the 94% on 2024.  AFV is normal. BPP is 8/8.     Risks associated with diabetes in pregnancy include higher risk for polyhydramnios, fetal macrosomia and  metabolic complications (hypoglycemia, hyperbilirubinemia, hypocalcemia, erythema).     Continue 2200 calorie ADA diet during pregnancy. It is recommended that she have 30 grams of carbohydrates with breakfast, and 60 grams with lunch and dinner. In addition, she should have three snacks in between meals with 15 grams of carbohydrates and at bedtime with 30 grams of carbohydrates.    Log reviewed.  Patient advised to adjust to 8 units of NPH in the morning and 32 units of NPH at bedtime.  Continue 1 unit of Humalog for every 8 mg of sugar over 140.    She was advised to keep her blood sugars checks and to call me " this Friday and Monday with her blood sugars. The need for strict blood sugar control with goals of treatment to keep pre-prandial blood sugars between 65 and 90 and 1 hr postprandial blood sugars less than 140 was reviewed.     Low dose aspirin as discussed.    Will plan to recheck fetal growth every 4-5 weeks. Fetal testing  could be started in this setting around 30 weeks gestation .She needs to do fetal kick counts throughout the pregnancy starting at 24 weeks.       History of shoulder dystocia in previous pregnancy, antepartum  It would be reasonable to consider a primary  if EFW is more than 8 lb (previous baby was 8 lb 4 oz), understanding the degree of error of ultrasound with potential lesser or higher actual fetal weight. Recommend limiting weight gain in pregnancy and adequate sugar control, which are risk factors for recurrence.      Seizure disorder in pregnancy  I reviewed with her the association of seizure disorder with higher risk of anomalies even without any use of medication. I discussed that the risk of seizure outweigh any risk of medication and recommend her to continue current medication regimen per neurology (Keppra 50 mg twice daily), and continue Folic acid 1 mg daily. If she has any seizures, she needs to report that immediately.  It is also recommended to avoid driving or operating heavy/hazardous equipment until 6 months post last seizure.    There are risks to the fetus from maternal seizures. Fetal hypoxia may occur as a result of decreased placental blood flow or postictal apnea and there are risks of injury to the fetus, abruption, or miscarriage due to maternal trauma sustained during a seizure.    Additionally, the higher risk of fetal growth restriction with seizure disorder was previously addressed.  I recommend serial ultrasounds to monitor fetal growth. I recommend doing fetal kick counts throughout the second and third trimester. If evidence of fetal growth  restriction or other complications occur, then more formal fetal surveillance will be needed.      Increased BMI in pregnancy  Body mass index is 31.64 kg/m².  With fluctuating weight but overall stable since November, continue healthy low caloric diet and follow diabetic diet.  Excess weight gain would be associated with gestational hypertension, gestational diabetes and adverse  outcomes, including fetal demise in utero.    With risk factors associated with increased BMI, she is to do fetal kick counts throughout the pregnancy (after 24 weeks).    It is important to lose weight after the pregnancy is over, especially before a future pregnancy was discussed. Breastfeeding may be an important tool in reducing the postpartum weight retention. Fetal risks were discussed with short term risk of fetal/ obesity and long term risk of adolescent component of metabolic syndrome.      History of preeclampsia  With increased risk for recurrence, it was agreed to continue asa 81 mg BID until 34 6/7 weeks, then decrease to once daily until delivery.. Preeclampsia precautions reviewed.      Bipolar disorder in pregnancy  Reviewed risks with depression and risks/benefits of medication use in pregnancy.    Although discontinuing the medication for a few weeks before delivery  has been suggested, to avoid  withdrawal, the potential risks to mom and lack of proven benefit from this approach, makes continuing medication till delivery a reasonable option.With symptom control, reasonable to continue Lexapro 10 mg daily.     She was also advised to report any worsening of symptoms or SI/HI tendencies immediately to provider/ER for prompt intervention. She was advised to continue follow up care with her Mental Health provider.        Anemia in pregnancy  Anemia in pregnancy is associated with a 2-fold increased risk for  delivery and 3-fold increased risk for delivering a low birth weight baby.       Continue current supplementation. Recommend intermittent H/H throughout the pregnancy to assess response to supplementation and guide titration of dosing.    With significantly worsened H&H, ordered the follow-up CBC at last visit, which she has not done yet. Reminded to do.       Elevated BP reading x1 without diagnosis of hypertension  BP Readings from Last 1 Encounters:   01/17/24 127/80   She had an initial reading of 143/78.     Patient has no symptoms.  Advised her that if persistently elevated maybe significant.  However out of caution agreed to do preeclampsia labs since she is at risk for preeclampsia. Patient will go to do the lab work today.  Preeclampsia warnings were given.  Patient was advised to come in immediately if she has any headaches, vision problems, epigastric pain, or decreased fetal movement at any time.     Blood sugars were reviewed and postprandial blood sugars are mostly normal except for a scattered elevation related to diet.  However fasting blood sugars are all elevated up to 140s.  I increased the dose of insulin to 32 units of NPH at bedtime kept the NPH in the morning of the same.  Advised her check her sugar at 3:00 a.m..  Advised patient to call me with her blood sugars Friday and Monday  With blood pressure elevation today, preeclampsia warnings were given and patient was sent to the hospital to do lab work.    Follow up in about 1 week (around 1/24/2024) for MFM follow-up, BPP.     Future Appointments   Date Time Provider Department Center   1/24/2024  2:00 PM Nolan Juárez MD [facility]   1/24/2024  2:00 PM ROOM 1 AND 2, [facility]   1/29/2024  1:45 PM Isma Payne MD [facility]   3/11/2024  2:00 PM Zhanna High MD [facility] [place]        [facility] involvement: Patient was evaluated by JAYCE Chan and Dr. Juárez.  Final assessment and recommendations as stated above were made by Dr. Juárez.    This note was created  with the assistance of [employer] voice recognition software. There may be transcription errors as a result of using this technology, however minimal. Effort has been made to ensure accuracy of transcription, but any obvious errors or omissions should be clarified with the author of the document.

## 2024-01-18 ENCOUNTER — TELEPHONE (OUTPATIENT)
Dept: MATERNAL FETAL MEDICINE | Facility: CLINIC | Age: 24
End: 2024-01-18
Payer: MEDICAID

## 2024-01-18 NOTE — TELEPHONE ENCOUNTER
----- Message from Radhika Whitley PA-C sent at 1/18/2024  2:04 PM CST -----  Please notify patient of below:    1. Preeclampsia labs were reassuring.    2. Her blood count was slightly improved but still low. We will discuss option of IV iron vs. Waiting and rechecking in a few weeks when she comes to her visit next week. Keep taking the iron/vit c/folic acid supplements we sent for her.      Called Pt and told her the results of her labs. Pt's questions were answered. Pt verbalized understanding.

## 2024-01-18 NOTE — PROGRESS NOTES
Please notify patient of below:    1. Preeclampsia labs were reassuring.    2. Her blood count was slightly improved but still low. We will discuss option of IV iron vs. Waiting and rechecking in a few weeks when she comes to her visit next week. Keep taking the iron/vit c/folic acid supplements we sent for her.

## 2024-01-22 DIAGNOSIS — O09.293 HX OF PREECLAMPSIA, PRIOR PREGNANCY, CURRENTLY PREGNANT, THIRD TRIMESTER: ICD-10-CM

## 2024-01-22 DIAGNOSIS — O99.353 SEIZURE DISORDER DURING PREGNANCY IN THIRD TRIMESTER: Primary | ICD-10-CM

## 2024-01-22 DIAGNOSIS — O24.113 PRE-EXISTING TYPE 2 DIABETES MELLITUS DURING PREGNANCY IN THIRD TRIMESTER: ICD-10-CM

## 2024-01-22 DIAGNOSIS — F31.9 BIPOLAR DISEASE DURING PREGNANCY IN THIRD TRIMESTER: ICD-10-CM

## 2024-01-22 DIAGNOSIS — O99.013 ANEMIA DURING PREGNANCY IN THIRD TRIMESTER: ICD-10-CM

## 2024-01-22 DIAGNOSIS — O99.343 BIPOLAR DISEASE DURING PREGNANCY IN THIRD TRIMESTER: ICD-10-CM

## 2024-01-22 DIAGNOSIS — G40.909 SEIZURE DISORDER DURING PREGNANCY IN THIRD TRIMESTER: Primary | ICD-10-CM

## 2024-01-23 NOTE — PROGRESS NOTES
Maternal Fetal Medicine Follow Up    Subjective     Patient ID: [id number]    Chief Complaint: MFM follow up with US (Seizure disorder and Type 2 Diabetes.   Patient is having a lump on her buttucks. )      HPI: Norma Denise is a 23 y.o. adult  at 30w4d gestation with Estimated Date of Delivery: 3/30/24  who is here for follow  up consultation by MFM.    She has type 2 diabetes, diagnosed as a child. She is on insulin, 8 units of NPH in the morning,  and 32 units of NPH at bedtime. She has corrective formula of 1 unit of Humalog for every 8 mg over 140. On 10/19/2023, hemoglobin A1c was 5.5.  Patient had a normal fetal echocardiogram on 2023. She has history of shoulder dystocia in her last pregnancy.  Baby weighed 8 lb 4 oz and she reports had some nerve damage to 1 arm and does physical therapy.  She had preeclampsia in her last pregnancy. She has history of seizure disorder, diagnosed in childhood.  She is currently on Keppra 500 mg twice daily after report of seizure earlier in October.  She is also on folic acid 1 mg daily.  She had an appointment with Neurology on 2023 with Dr. High and was advised to continue Keppra 500 mg b.i.d. and folic acid daily.  She has increased BMI of 31 at consult visit.  She is on low-dose aspirin twice daily.  She has history of bipolar disorder and depression and is on Lexapro 10 mg daily.  She has history of anemia and is on oral hematinic therapy.  On 10/19/2023, H&H 9.2/30.4.  On 2023, iron level was 21, TIBC was 473, H&H 7.5/25.2, significantly decreased from previous assessment. She is s/p 2 doses of IV Ferrlecit due to significant iron deficiency anemia.  On 2024, H&H was 8.5/29.2 with significant improvement. She had elevated blood pressure reading x1 at last visit on 2024 for which preeclampsia labs were ordered and were reassuring with platelets 402, ALT 9, AST 12, creatinine 0.62, uric acid 4.0, .    Patient reports a lump that  she noticed about 3-4 days ago at the top of her buttocks my right side.  She reports the area is red and tender.  She denies any fever or chills.      Interval history since last [facility] visit: None.. She denies any leaking fluid, vaginal bleeding, contractions, decreased fetal movement. Denies headaches, visual disturbances, or epigastric pain.    Pregnancy complications include:   Patient Active Problem List   Diagnosis    Bipolar disease during pregnancy in third trimester    Pre-existing type 2 diabetes mellitus during pregnancy in third trimester    Seizure disorder during pregnancy in third trimester    H/O shoulder dystocia in prior pregnancy, currently pregnant    Hx of preeclampsia, prior pregnancy, currently pregnant, third trimester    At high risk for complications of intrauterine pregnancy (IUP)    Increased BMI affecting pregnancy in third trimester    Anemia during pregnancy in third trimester    Functional neurological symptom disorder with attacks or seizures    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    Medications:  Current Outpatient Medications   Medication Instructions    albuterol (PROVENTIL/VENTOLIN HFA) 90 mcg/actuation inhaler 1-2 puffs, Inhalation, Every 6 hours PRN, Rescue    aspirin (ECOTRIN) 162 mg, Oral, Daily, Start at 12 weeks gestation.  At 36 weeks gestation, decrease to one 81mg tablet daily.    blood sugar diagnostic (TRUE METRIX GLUCOSE TEST STRIP) Strp USE TO TEST BLOOD GLUCOSE AS DIRECTED 6-8 X/DAY    EScitalopram oxalate (LEXAPRO) 10 mg, Oral, Daily    folic acid (FOLVITE) 1 mg, Oral, Daily    insulin lispro (HUMALOG U-100 INSULIN) 100 unit/mL injection Inject 1 unit for every 8 mg over 140.  OK to substitute    insulin NPH (HUMULIN N NPH U-100 INSULIN) 100 unit/mL injection Inject  "10 units in AM and 10 units at bedtime.  OK to substitute    levETIRAcetam (KEPPRA) 500 mg, Oral, 2 times daily    mirtazapine (REMERON) 30 mg, Oral, Nightly    ondansetron (ZOFRAN-ODT) 4 mg, Oral, Every 6 hours PRN    prenatal vit no.124-iron-folic (PRENATAL VITAMIN) 27 mg iron- 800 mcg Tab 1 tablet, Oral, Daily    PRENATAL VITAMIN 27 mg iron- 0.8 mg Tab     TRUE METRIX GLUCOSE METER Misc USE TO CHECK BLOOD GLUCOSE FOUR TIMES DAILY.    TRUEPLUS INSULIN 1 mL 31 gauge x 5/16 Syrg Inject 1 syringe 4 x daily as needed.  OK to substitute    TRUEPLUS LANCETS 33 gauge Misc USE TO CHECK BLOOD GLUCOSE FOUR TIMES DAILY.       Review of Systems   12 point review of systems conducted, negative except as stated in the history of present illness. See HPI for details.      Objective     Visit Vitals  /82 (BP Location: Left arm, Patient Position: Sitting, BP Method: Medium (Automatic))   Pulse 108   Ht 5' 7" (1.702 m)   Wt 91.6 kg (202 lb)   LMP 2023 (Exact Date)   BMI 31.64 kg/m²        Physical Exam  Vitals and nursing note reviewed.   Constitutional:       Appearance: Normal appearance.      Comments: Increased BMI   HENT:      Head: Normocephalic and atraumatic.      Nose: Nose normal. No congestion.   Cardiovascular:      Rate and Rhythm: Normal rate.   Pulmonary:      Effort: Pulmonary effort is normal.   Skin:     Findings: No rash.      Comments: Cellulitis in the upper right buttocks area, looks like a pilonidal infection, with induration but no evidence of an abscess that is developed yet   Neurological:      Mental Status: Arlen Denise is alert and oriented to person, place, and time.   Psychiatric:         Mood and Affect: Mood normal.         Behavior: Behavior normal.         Thought Content: Thought content normal.         Judgment: Judgment normal.           ASSESSMENT/PLAN:     23 y.o.  female with IUP at 30w4d    Type 2 diabetes mellitus in pregnancy  There is upper normal fetal growth with an " EFW of 1772 g at the 62% and the AC at the 94% on 2024.  AFV is normal. BPP is 8/8.     Risks associated with diabetes in pregnancy include higher risk for polyhydramnios, fetal macrosomia and  metabolic complications (hypoglycemia, hyperbilirubinemia, hypocalcemia, erythema).     Continue 2200 calorie ADA diet during pregnancy. It is recommended that she have 30 grams of carbohydrates with breakfast, and 60 grams with lunch and dinner. In addition, she should have three snacks in between meals with 15 grams of carbohydrates and at bedtime with 30 grams of carbohydrates.    Log reviewed.  Patient advised to adjust the dose of insulin to 8 units of NPH in the morning and 42 units of NPH at bedtime.  Patient was advised to check her sugar at 3:00 a.m. and call me Friday with her sugars.  Continue 1 unit of Humalog for every 8 mg of sugar over 140.    She was advised to keep her blood sugars checks and to call me this Friday and Monday with her blood sugars. The need for strict blood sugar control with goals of treatment to keep pre-prandial blood sugars between 65 and 90 and 1 hr postprandial blood sugars less than 140 was reviewed.     Low dose aspirin as discussed.    Will plan to recheck fetal growth every 4-5 weeks. Fetal testing  could be started in this setting around 30 weeks gestation .She needs to do fetal kick counts throughout the pregnancy starting at 24 weeks.       History of shoulder dystocia in previous pregnancy, antepartum  It would be reasonable to consider a primary  if EFW is more than 8 lb (previous baby was 8 lb 4 oz), understanding the degree of error of ultrasound with potential lesser or higher actual fetal weight. Recommend limiting weight gain in pregnancy and adequate sugar control, which are risk factors for recurrence.      Seizure disorder in pregnancy  I reviewed with her the association of seizure disorder with higher risk of anomalies even without any use of  medication. I discussed that the risk of seizure outweigh any risk of medication and recommend her to continue current medication regimen per neurology (Keppra 50 mg twice daily), and continue Folic acid 1 mg daily. If she has any seizures, she needs to report that immediately.  It is also recommended to avoid driving or operating heavy/hazardous equipment until 6 months post last seizure.    There are risks to the fetus from maternal seizures. Fetal hypoxia may occur as a result of decreased placental blood flow or postictal apnea and there are risks of injury to the fetus, abruption, or miscarriage due to maternal trauma sustained during a seizure.    Additionally, the higher risk of fetal growth restriction with seizure disorder was previously addressed.  I recommend serial ultrasounds to monitor fetal growth. I recommend doing fetal kick counts throughout the second and third trimester. If evidence of fetal growth restriction or other complications occur, then more formal fetal surveillance will be needed.      Increased BMI in pregnancy  Body mass index is 31.64 kg/m².  With fluctuating weight but overall stable since November, continue healthy low caloric diet and follow diabetic diet.  Excess weight gain would be associated with gestational hypertension, gestational diabetes and adverse  outcomes, including fetal demise in utero.    With risk factors associated with increased BMI, she is to do fetal kick counts throughout the pregnancy (after 24 weeks).    It is important to lose weight after the pregnancy is over, especially before a future pregnancy was discussed. Breastfeeding may be an important tool in reducing the postpartum weight retention. Fetal risks were discussed with short term risk of fetal/ obesity and long term risk of adolescent component of metabolic syndrome.      History of preeclampsia  With increased risk for recurrence, it was agreed to continue asa 81 mg BID until 34  6/7 weeks, then decrease to once daily until delivery.. Preeclampsia precautions reviewed.      Bipolar disorder in pregnancy  Reviewed risks with depression and risks/benefits of medication use in pregnancy.    Although discontinuing the medication for a few weeks before delivery  has been suggested, to avoid  withdrawal, the potential risks to mom and lack of proven benefit from this approach, makes continuing medication till delivery a reasonable option.With symptom control, reasonable to continue Lexapro 10 mg daily.     She was also advised to report any worsening of symptoms or SI/HI tendencies immediately to provider/ER for prompt intervention. She was advised to continue follow up care with her Mental Health provider.        Anemia in pregnancy  Anemia in pregnancy is associated with a 2-fold increased risk for  delivery and 3-fold increased risk for delivering a low birth weight baby.      Continue current supplementation. Recommend intermittent H/H throughout the pregnancy to assess response to supplementation and guide titration of dosing.    Pilonidal infection in the upper right buttocks area    Patient was advised to do this warm bath few times a day, and start Cleocin 300 mg p.o. q.8 hours.  Prescription for 7 days given.  Patient was advised that this may need to be drained but is not ready for drainage at this time.  Discussed with Dr. Payne.  She will be following next week with Dr. Payne will follow on that.      Elevated BP reading x1 without diagnosis of hypertension  BP Readings from Last 1 Encounters:   24 120/82     Blood pressure is normal today.  Patient has no symptoms.  Advised her that if if another elevation is noted this would confirm the diagnosis of gestational hypertension.    Preeclampsia labs were reassuring on 2024.  Preeclampsia warnings were given.  Patient was advised to come in immediately if she has any headaches, vision problems, epigastric pain,  or decreased fetal movement at any time.       Follow up in about 1 week (around 1/31/2024) for MFM follow-up, BPP.     Future Appointments   Date Time Provider Department Center   1/29/2024  1:45 PM Isma Payne MD [facility]   3/11/2024  2:00 PM Zhanna High MD [facility] [place]        [employer] involvement: Patient was evaluated and examined by Dr. Juárez. JAYCE Chan, helped in pre charting of part of note.    This note was created with the assistance of [employer] voice recognition software. There may be transcription errors as a result of using this technology, however minimal. Effort has been made to ensure accuracy of transcription, but any obvious errors or omissions should be clarified with the author of the document.

## 2024-01-24 ENCOUNTER — OFFICE VISIT (OUTPATIENT)
Dept: MATERNAL FETAL MEDICINE | Facility: CLINIC | Age: 24
End: 2024-01-24
Payer: MEDICAID

## 2024-01-24 ENCOUNTER — PROCEDURE VISIT (OUTPATIENT)
Dept: MATERNAL FETAL MEDICINE | Facility: CLINIC | Age: 24
End: 2024-01-24
Payer: MEDICAID

## 2024-01-24 VITALS
HEIGHT: 67 IN | WEIGHT: 202 LBS | SYSTOLIC BLOOD PRESSURE: 120 MMHG | BODY MASS INDEX: 31.71 KG/M2 | DIASTOLIC BLOOD PRESSURE: 82 MMHG | HEART RATE: 108 BPM

## 2024-01-24 DIAGNOSIS — O99.343 BIPOLAR DISEASE DURING PREGNANCY IN THIRD TRIMESTER: ICD-10-CM

## 2024-01-24 DIAGNOSIS — O09.293 HX OF PREECLAMPSIA, PRIOR PREGNANCY, CURRENTLY PREGNANT, THIRD TRIMESTER: ICD-10-CM

## 2024-01-24 DIAGNOSIS — O99.013 ANEMIA DURING PREGNANCY IN THIRD TRIMESTER: ICD-10-CM

## 2024-01-24 DIAGNOSIS — O99.353 SEIZURE DISORDER DURING PREGNANCY IN THIRD TRIMESTER: ICD-10-CM

## 2024-01-24 DIAGNOSIS — G40.909 SEIZURE DISORDER DURING PREGNANCY IN THIRD TRIMESTER: ICD-10-CM

## 2024-01-24 DIAGNOSIS — O99.353 SEIZURE DISORDER DURING PREGNANCY IN THIRD TRIMESTER: Primary | ICD-10-CM

## 2024-01-24 DIAGNOSIS — L02.31 CELLULITIS AND ABSCESS OF BUTTOCK: ICD-10-CM

## 2024-01-24 DIAGNOSIS — F31.9 BIPOLAR DISEASE DURING PREGNANCY IN THIRD TRIMESTER: ICD-10-CM

## 2024-01-24 DIAGNOSIS — O99.213 OBESITY AFFECTING PREGNANCY IN THIRD TRIMESTER, UNSPECIFIED OBESITY TYPE: ICD-10-CM

## 2024-01-24 DIAGNOSIS — G40.909 SEIZURE DISORDER DURING PREGNANCY IN THIRD TRIMESTER: Primary | ICD-10-CM

## 2024-01-24 DIAGNOSIS — O09.299 H/O SHOULDER DYSTOCIA IN PRIOR PREGNANCY, CURRENTLY PREGNANT: ICD-10-CM

## 2024-01-24 DIAGNOSIS — O24.113 PRE-EXISTING TYPE 2 DIABETES MELLITUS DURING PREGNANCY IN THIRD TRIMESTER: ICD-10-CM

## 2024-01-24 DIAGNOSIS — O09.90 AT HIGH RISK FOR COMPLICATIONS OF INTRAUTERINE PREGNANCY (IUP): ICD-10-CM

## 2024-01-24 DIAGNOSIS — L03.317 CELLULITIS AND ABSCESS OF BUTTOCK: ICD-10-CM

## 2024-01-24 PROCEDURE — 1159F MED LIST DOCD IN RCRD: CPT | Mod: CPTII,S$GLB,, | Performed by: OBSTETRICS & GYNECOLOGY

## 2024-01-24 PROCEDURE — 76819 FETAL BIOPHYS PROFIL W/O NST: CPT | Mod: S$GLB,,, | Performed by: OBSTETRICS & GYNECOLOGY

## 2024-01-24 PROCEDURE — 99214 OFFICE O/P EST MOD 30 MIN: CPT | Mod: TH,S$GLB,, | Performed by: OBSTETRICS & GYNECOLOGY

## 2024-01-24 PROCEDURE — 3079F DIAST BP 80-89 MM HG: CPT | Mod: CPTII,S$GLB,, | Performed by: OBSTETRICS & GYNECOLOGY

## 2024-01-24 PROCEDURE — 3074F SYST BP LT 130 MM HG: CPT | Mod: CPTII,S$GLB,, | Performed by: OBSTETRICS & GYNECOLOGY

## 2024-01-24 PROCEDURE — 3008F BODY MASS INDEX DOCD: CPT | Mod: CPTII,S$GLB,, | Performed by: OBSTETRICS & GYNECOLOGY

## 2024-01-24 RX ORDER — CLINDAMYCIN HYDROCHLORIDE 300 MG/1
300 CAPSULE ORAL EVERY 8 HOURS
Qty: 21 CAPSULE | Refills: 0 | Status: SHIPPED | OUTPATIENT
Start: 2024-01-24 | End: 2024-06-10

## 2024-01-29 ENCOUNTER — PATIENT MESSAGE (OUTPATIENT)
Dept: NEUROLOGY | Facility: CLINIC | Age: 24
End: 2024-01-29
Payer: MEDICAID

## 2024-01-31 DIAGNOSIS — O09.90 AT HIGH RISK FOR COMPLICATIONS OF INTRAUTERINE PREGNANCY (IUP): ICD-10-CM

## 2024-01-31 DIAGNOSIS — O99.353 SEIZURE DISORDER DURING PREGNANCY IN THIRD TRIMESTER: Primary | ICD-10-CM

## 2024-01-31 DIAGNOSIS — O99.013 ANEMIA DURING PREGNANCY IN THIRD TRIMESTER: ICD-10-CM

## 2024-01-31 DIAGNOSIS — G40.909 SEIZURE DISORDER DURING PREGNANCY IN THIRD TRIMESTER: Primary | ICD-10-CM

## 2024-01-31 DIAGNOSIS — O09.293 HX OF PREECLAMPSIA, PRIOR PREGNANCY, CURRENTLY PREGNANT, THIRD TRIMESTER: ICD-10-CM

## 2024-01-31 DIAGNOSIS — O24.113 PRE-EXISTING TYPE 2 DIABETES MELLITUS DURING PREGNANCY IN THIRD TRIMESTER: ICD-10-CM

## 2024-01-31 DIAGNOSIS — O99.343 BIPOLAR DISEASE DURING PREGNANCY IN THIRD TRIMESTER: ICD-10-CM

## 2024-01-31 DIAGNOSIS — F31.9 BIPOLAR DISEASE DURING PREGNANCY IN THIRD TRIMESTER: ICD-10-CM

## 2024-02-01 ENCOUNTER — PROCEDURE VISIT (OUTPATIENT)
Dept: MATERNAL FETAL MEDICINE | Facility: CLINIC | Age: 24
End: 2024-02-01
Payer: MEDICAID

## 2024-02-01 ENCOUNTER — OFFICE VISIT (OUTPATIENT)
Dept: MATERNAL FETAL MEDICINE | Facility: CLINIC | Age: 24
End: 2024-02-01
Payer: MEDICAID

## 2024-02-01 VITALS
SYSTOLIC BLOOD PRESSURE: 113 MMHG | WEIGHT: 204 LBS | BODY MASS INDEX: 32.02 KG/M2 | HEIGHT: 67 IN | HEART RATE: 112 BPM | DIASTOLIC BLOOD PRESSURE: 74 MMHG

## 2024-02-01 DIAGNOSIS — L03.317 CELLULITIS AND ABSCESS OF BUTTOCK: ICD-10-CM

## 2024-02-01 DIAGNOSIS — O24.113 PRE-EXISTING TYPE 2 DIABETES MELLITUS DURING PREGNANCY IN THIRD TRIMESTER: ICD-10-CM

## 2024-02-01 DIAGNOSIS — G40.909 SEIZURE DISORDER DURING PREGNANCY IN THIRD TRIMESTER: ICD-10-CM

## 2024-02-01 DIAGNOSIS — O09.293 HX OF PREECLAMPSIA, PRIOR PREGNANCY, CURRENTLY PREGNANT, THIRD TRIMESTER: ICD-10-CM

## 2024-02-01 DIAGNOSIS — O99.353 SEIZURE DISORDER DURING PREGNANCY IN THIRD TRIMESTER: ICD-10-CM

## 2024-02-01 DIAGNOSIS — O99.343 BIPOLAR DISEASE DURING PREGNANCY IN THIRD TRIMESTER: ICD-10-CM

## 2024-02-01 DIAGNOSIS — O09.299 H/O SHOULDER DYSTOCIA IN PRIOR PREGNANCY, CURRENTLY PREGNANT: ICD-10-CM

## 2024-02-01 DIAGNOSIS — O99.013 ANEMIA DURING PREGNANCY IN THIRD TRIMESTER: ICD-10-CM

## 2024-02-01 DIAGNOSIS — F31.9 BIPOLAR DISEASE DURING PREGNANCY IN THIRD TRIMESTER: ICD-10-CM

## 2024-02-01 DIAGNOSIS — O09.90 AT HIGH RISK FOR COMPLICATIONS OF INTRAUTERINE PREGNANCY (IUP): ICD-10-CM

## 2024-02-01 DIAGNOSIS — L02.31 CELLULITIS AND ABSCESS OF BUTTOCK: ICD-10-CM

## 2024-02-01 DIAGNOSIS — O99.213 OBESITY AFFECTING PREGNANCY IN THIRD TRIMESTER, UNSPECIFIED OBESITY TYPE: ICD-10-CM

## 2024-02-01 DIAGNOSIS — O99.353 SEIZURE DISORDER DURING PREGNANCY IN THIRD TRIMESTER: Primary | ICD-10-CM

## 2024-02-01 DIAGNOSIS — G40.909 SEIZURE DISORDER DURING PREGNANCY IN THIRD TRIMESTER: Primary | ICD-10-CM

## 2024-02-01 PROCEDURE — 76819 FETAL BIOPHYS PROFIL W/O NST: CPT | Mod: S$GLB,,, | Performed by: OBSTETRICS & GYNECOLOGY

## 2024-02-01 PROCEDURE — 3078F DIAST BP <80 MM HG: CPT | Mod: CPTII,S$GLB,, | Performed by: OBSTETRICS & GYNECOLOGY

## 2024-02-01 PROCEDURE — 3074F SYST BP LT 130 MM HG: CPT | Mod: CPTII,S$GLB,, | Performed by: OBSTETRICS & GYNECOLOGY

## 2024-02-01 PROCEDURE — 3008F BODY MASS INDEX DOCD: CPT | Mod: CPTII,S$GLB,, | Performed by: OBSTETRICS & GYNECOLOGY

## 2024-02-01 PROCEDURE — 1159F MED LIST DOCD IN RCRD: CPT | Mod: CPTII,S$GLB,, | Performed by: OBSTETRICS & GYNECOLOGY

## 2024-02-01 PROCEDURE — 99214 OFFICE O/P EST MOD 30 MIN: CPT | Mod: TH,S$GLB,, | Performed by: OBSTETRICS & GYNECOLOGY

## 2024-02-01 NOTE — PROGRESS NOTES
Maternal Fetal Medicine Follow Up    Subjective     Patient ID: [id number]    Chief Complaint: MFM follow up w/us  (Pt c/o pelvic pain )      HPI: Norma Denise is a 23 y.o. adult  at 31w5d gestation with Estimated Date of Delivery: 3/30/24  who is here for follow  up consultation by MFM.    She has type 2 diabetes, diagnosed as a child. She is on insulin, 8 units of NPH in the morning,  and 42 units of NPH at bedtime. She has corrective formula of 1 unit of Humalog for every 8 mg over 140. On 10/19/2023, hemoglobin A1c was 5.5.  Patient had a normal fetal echocardiogram on 2023. She has history of shoulder dystocia in her last pregnancy.  Baby weighed 8 lb 4 oz and she reports had some nerve damage to 1 arm and does physical therapy.  She had preeclampsia in her last pregnancy. She has history of seizure disorder, diagnosed in childhood.  She is currently on Keppra 500 mg twice daily after report of seizure earlier in October.  She is also on folic acid 1 mg daily.  She had an appointment with Neurology on 2023 with Dr. High and was advised to continue Keppra 500 mg b.i.d. and folic acid daily.  She has increased BMI of 31 at consult visit.  She is on low-dose aspirin twice daily.  She has history of bipolar disorder and depression and is on Lexapro 10 mg daily.  She has history of anemia and is on oral hematinic therapy.  On 10/19/2023, H&H 9.2/30.4.  On 2023, iron level was 21, TIBC was 473, H&H 7.5/25.2, significantly decreased from previous assessment. She is s/p 2 doses of IV Ferrlecit due to significant iron deficiency anemia.  On 2024, H&H was 8.5/29.2 with significant improvement. She had elevated blood pressure reading x1 at last visit on 2024 for which preeclampsia labs were ordered and were reassuring with platelets 402, ALT 9, AST 12, creatinine 0.62, uric acid 4.0, .  Patient was noted to have cellulitis/pilonidal infection at the right upper gluteal cleft at  last visit on 1/24/2024.  She was started on Cleocin 300 mg p.o. q.8 hours x7 days.  Patient is finishing the treatment and reported that the infection has resolved.      Interval history since last [facility] visit: None.. She denies any leaking fluid, vaginal bleeding, contractions, decreased fetal movement. Denies headaches, visual disturbances, or epigastric pain.    Pregnancy complications include:   Patient Active Problem List   Diagnosis    Bipolar disease during pregnancy in third trimester    Pre-existing type 2 diabetes mellitus during pregnancy in third trimester    Seizure disorder during pregnancy in third trimester    H/O shoulder dystocia in prior pregnancy, currently pregnant    Hx of preeclampsia, prior pregnancy, currently pregnant, third trimester    At high risk for complications of intrauterine pregnancy (IUP)    Increased BMI affecting pregnancy in third trimester    Anemia during pregnancy in third trimester    Functional neurological symptom disorder with attacks or seizures    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    Medications:  Current Outpatient Medications   Medication Instructions    albuterol (PROVENTIL/VENTOLIN HFA) 90 mcg/actuation inhaler 1-2 puffs, Inhalation, Every 6 hours PRN, Rescue    aspirin (ECOTRIN) 162 mg, Oral, Daily, Start at 12 weeks gestation.  At 36 weeks gestation, decrease to one 81mg tablet daily.    blood sugar diagnostic (TRUE METRIX GLUCOSE TEST STRIP) Strp USE TO TEST BLOOD GLUCOSE AS DIRECTED 6-8 X/DAY    clindamycin (CLEOCIN) 300 mg, Oral, Every 8 hours    EScitalopram oxalate (LEXAPRO) 10 mg, Oral, Daily    folic acid (FOLVITE) 1 mg, Oral, Daily    insulin lispro (HUMALOG U-100 INSULIN) 100 unit/mL injection Inject 1 unit for every 8 mg  "over 140.  OK to substitute    insulin NPH (HUMULIN N NPH U-100 INSULIN) 100 unit/mL injection Inject 10 units in AM and 10 units at bedtime.  OK to substitute    levETIRAcetam (KEPPRA) 500 mg, Oral, 2 times daily    mirtazapine (REMERON) 30 mg, Oral, Nightly    ondansetron (ZOFRAN-ODT) 4 mg, Oral, Every 6 hours PRN    prenatal vit no.124-iron-folic (PRENATAL VITAMIN) 27 mg iron- 800 mcg Tab 1 tablet, Oral, Daily    PRENATAL VITAMIN 27 mg iron- 0.8 mg Tab     TRUE METRIX GLUCOSE METER Misc USE TO CHECK BLOOD GLUCOSE FOUR TIMES DAILY.    TRUEPLUS INSULIN 1 mL 31 gauge x 5/16 Syrg Inject 1 syringe 4 x daily as needed.  OK to substitute    TRUEPLUS LANCETS 33 gauge Misc USE TO CHECK BLOOD GLUCOSE FOUR TIMES DAILY.       Review of Systems   12 point review of systems conducted, negative except as stated in the history of present illness. See HPI for details.      Objective     Visit Vitals  /74 (BP Location: Left arm, Patient Position: Sitting, BP Method: Medium (Automatic))   Pulse (!) 112   Ht 5' 7" (1.702 m)   Wt 92.5 kg (204 lb)   LMP 2023 (Exact Date)   BMI 31.95 kg/m²        Physical Exam  Vitals and nursing note reviewed.   Constitutional:       Appearance: Normal appearance.      Comments: Increased BMI   HENT:      Head: Normocephalic and atraumatic.      Nose: Nose normal. No congestion.   Cardiovascular:      Rate and Rhythm: Normal rate.   Pulmonary:      Effort: Pulmonary effort is normal.   Skin:     Findings: No rash.      Comments: Cellulitis in the upper right buttocks area, looks like a pilonidal infection, with induration but no evidence of an abscess that is developed yet   Neurological:      Mental Status: Arlen Denise is alert and oriented to person, place, and time.   Psychiatric:         Mood and Affect: Mood normal.         Behavior: Behavior normal.         Thought Content: Thought content normal.         Judgment: Judgment normal.           ASSESSMENT/PLAN:     23 y.o.  " female with IUP at 31w5d    Type 2 diabetes mellitus in pregnancy  There is upper normal fetal growth with an EFW of 1772 g at the 62% and the AC at the 94% on 2024.  AFV is normal. BPP is 8/8.     Risks associated with diabetes in pregnancy include higher risk for polyhydramnios, fetal macrosomia and  metabolic complications (hypoglycemia, hyperbilirubinemia, hypocalcemia, erythema).     Continue 2200 calorie ADA diet during pregnancy. It is recommended that she have 30 grams of carbohydrates with breakfast, and 60 grams with lunch and dinner. In addition, she should have three snacks in between meals with 15 grams of carbohydrates and at bedtime with 30 grams of carbohydrates.    Log reviewed.  Patient advised to adjust the dose of insulin to 8 units of NPH and 40 units of Humalog in the morning and 52 units of NPH at bedtime. Continue 1 unit of Humalog for every 8 mg of sugar over 140.    She was advised to keep her blood sugars checks and to call me this Friday and Monday with her blood sugars. The need for strict blood sugar control with goals of treatment to keep pre-prandial blood sugars between 65 and 90 and 1 hr postprandial blood sugars less than 140 was reviewed.     Low dose aspirin as discussed.    Patient will continue fetal kick counts 3 times a day and start next week twice weekly testing.      History of shoulder dystocia in previous pregnancy, antepartum  It would be reasonable to consider a primary  if EFW is more than 8 lb (previous baby was 8 lb 4 oz), understanding the degree of error of ultrasound with potential lesser or higher actual fetal weight. Recommend limiting weight gain in pregnancy and adequate sugar control, which are risk factors for recurrence.      Seizure disorder in pregnancy  I reviewed with her the association of seizure disorder with higher risk of anomalies even without any use of medication. I discussed that the risk of seizure outweigh any risk of  medication and recommend her to continue current medication regimen per neurology (Keppra 50 mg twice daily), and continue Folic acid 1 mg daily. If she has any seizures, she needs to report that immediately.  It is also recommended to avoid driving or operating heavy/hazardous equipment until 6 months post last seizure.    There are risks to the fetus from maternal seizures. Fetal hypoxia may occur as a result of decreased placental blood flow or postictal apnea and there are risks of injury to the fetus, abruption, or miscarriage due to maternal trauma sustained during a seizure.    Additionally, the higher risk of fetal growth restriction with seizure disorder was previously addressed.  I recommend serial ultrasounds to monitor fetal growth. I recommend doing fetal kick counts throughout the second and third trimester. If evidence of fetal growth restriction or other complications occur, then more formal fetal surveillance will be needed.      Increased BMI in pregnancy  Body mass index is 31.95 kg/m².  With fluctuating weight but overall stable since November, continue healthy low caloric diet and follow diabetic diet.  Excess weight gain would be associated with gestational hypertension, gestational diabetes and adverse  outcomes, including fetal demise in utero.    With risk factors associated with increased BMI, she is to do fetal kick counts throughout the pregnancy (after 24 weeks).    It is important to lose weight after the pregnancy is over, especially before a future pregnancy was discussed. Breastfeeding may be an important tool in reducing the postpartum weight retention. Fetal risks were discussed with short term risk of fetal/ obesity and long term risk of adolescent component of metabolic syndrome.      History of preeclampsia  With increased risk for recurrence, it was agreed to continue asa 81 mg BID until 34 6/7 weeks, then decrease to once daily until delivery.. Preeclampsia  precautions reviewed.      Bipolar disorder in pregnancy  Reviewed risks with depression and risks/benefits of medication use in pregnancy.    Although discontinuing the medication for a few weeks before delivery  has been suggested, to avoid  withdrawal, the potential risks to mom and lack of proven benefit from this approach, makes continuing medication till delivery a reasonable option.With symptom control, reasonable to continue Lexapro 10 mg daily.     She was also advised to report any worsening of symptoms or SI/HI tendencies immediately to provider/ER for prompt intervention. She was advised to continue follow up care with her Mental Health provider.        Anemia in pregnancy  Anemia in pregnancy is associated with a 2-fold increased risk for  delivery and 3-fold increased risk for delivering a low birth weight baby.      Continue current supplementation. Recommend intermittent H/H throughout the pregnancy to assess response to supplementation and guide titration of dosing.      Pilonidal infection in the upper right buttocks area resolved  Patient was advised to do this warm bath few times a day, and start Cleocin 300 mg p.o. q.8 hours.  Patient is finishing the Cleocin treatment today.        Elevated BP reading x1 without diagnosis of hypertension  BP Readings from Last 1 Encounters:   24 113/74     Blood pressure is normal today.  Patient has no symptoms.  Advised her that if if another elevation is noted this would confirm the diagnosis of gestational hypertension.    Preeclampsia labs were reassuring on 2024.  Preeclampsia warnings were given.  Patient was advised to come in immediately if she has any headaches, vision problems, epigastric pain, or decreased fetal movement at any time.       Follow up in about 1 week (around 2024) for BPP, MFM follow-up.     Future Appointments   Date Time Provider Department Center   2024  3:55 PM Isma Payne MD [facility] AOBGYN  [facility]   3/11/2024  2:00 PM Zhanna High MD [facility]UR[facility] [place]        YESICA involvement: Patient was evaluated and examined by Dr. Juárez. JAYCE Chan, helped in pre charting of part of note.    This note was created with the assistance of [employer] voice recognition software. There may be transcription errors as a result of using this technology, however minimal. Effort has been made to ensure accuracy of transcription, but any obvious errors or omissions should be clarified with the author of the document.

## 2024-02-04 ENCOUNTER — HOSPITAL ENCOUNTER (EMERGENCY)
Facility: HOSPITAL | Age: 24
Discharge: HOME | End: 2024-02-04
Attending: OBSTETRICS & GYNECOLOGY
Payer: MEDICAID

## 2024-02-04 VITALS
HEART RATE: 101 BPM | WEIGHT: 204 LBS | BODY MASS INDEX: 32.02 KG/M2 | SYSTOLIC BLOOD PRESSURE: 128 MMHG | OXYGEN SATURATION: 99 % | TEMPERATURE: 98 F | RESPIRATION RATE: 18 BRPM | HEIGHT: 67 IN | DIASTOLIC BLOOD PRESSURE: 75 MMHG

## 2024-02-04 PROBLEM — Z3A.32 32 WEEKS GESTATION OF PREGNANCY: Status: ACTIVE | Noted: 2024-02-04

## 2024-02-04 LAB — GLUCOSE SERPL-MCNC: 142 MG/DL (ref 70–110)

## 2024-02-04 PROCEDURE — 82962 GLUCOSE BLOOD TEST: CPT

## 2024-02-04 PROCEDURE — 99284 EMERGENCY DEPT VISIT MOD MDM: CPT

## 2024-02-05 DIAGNOSIS — G40.909 SEIZURE DISORDER DURING PREGNANCY IN THIRD TRIMESTER: Primary | ICD-10-CM

## 2024-02-05 DIAGNOSIS — O99.353 SEIZURE DISORDER DURING PREGNANCY IN THIRD TRIMESTER: Primary | ICD-10-CM

## 2024-02-05 NOTE — HOSPITAL COURSE
Blood sugar noted to be 149 upon admission.  NST reactive.  Pt discharged and keep appt with primary OB.  Told to bring glucometer to make sure it is calibrated properly.

## 2024-02-05 NOTE — SUBJECTIVE & OBJECTIVE
Obstetric HPI:  Patient reports None contractions, active fetal movement, No vaginal bleeding , No loss of fluid     This pregnancy has been complicated by Type II DM and seizure disorder    OB History    Para Term  AB Living   3 1 1 0 1 1   SAB IAB Ectopic Multiple Live Births   1 0 0 0 1      # Outcome Date GA Lbr Fantasma/2nd Weight Sex Delivery Anes PTL Lv   3 Current            2 Term 2022 38w0d  3.742 kg (8 lb 4 oz) M Vag-Spont EPI N CRESENCIO   1 SAB 21     SAB   FD     Past Medical History:   Diagnosis Date    Adjustment disorder     Anemia     BEGAN WITH PREGNANCY OF LAST BABY, CURRENT    Anxiety     Asthma     CURRENT    Bipolar disorder     Depression     Diabetes mellitus 2023    TYPE 2 DIABETIC    History of psychiatric hospitalization      of psychiatric care     Hypertension     ECLAMPSIA WITH LABOR    Psychiatric exam requested by authority 2008    Psychiatric problem     Seizure disorder 2002    DIAGNOSED WHEN BORN    Sleep difficulties     Substance abuse     BEEN SOBER FOR 2 YEARS IN FEBRUARY    Suicide attempt     HAPPENS OFTEN, LAST ATTEMPT WAS      Past Surgical History:   Procedure Laterality Date    NO PAST SURGERIES         (Not in a hospital admission)      Review of patient's allergies indicates:  No Known Allergies     Family History       Problem Relation (Age of Onset)    Bipolar disorder Mother, Sister    No Known Problems Father, Brother, Maternal Aunt, Paternal Aunt, Maternal Uncle, Paternal Uncle, Maternal Grandfather, Maternal Grandmother, Paternal Grandfather, Paternal Grandmother, Cousin    Schizophrenia Mother, Sister          Tobacco Use    Smoking status: Former     Types: Vaping with nicotine, Cigarettes     Start date: 2020     Quit date:      Years since quittin.0     Passive exposure: Never    Smokeless tobacco: Never   Substance and Sexual Activity    Alcohol use: Not Currently    Drug use: Not  Currently     Frequency: 21.0 times per week     Types: Marijuana, Heroin    Sexual activity: Not Currently     Partners: Male     Review of Systems   Constitutional: Negative.    Respiratory: Negative.     Cardiovascular: Negative.    Gastrointestinal: Negative.    Endocrine: Positive for diabetes.   Genitourinary: Negative.    Musculoskeletal: Negative.    Integumentary:  Negative.   Neurological:  Positive for seizures.   Psychiatric/Behavioral:          Bipolar      Objective:     Vital Signs (Most Recent):  Temp: 98.2 °F (36.8 °C) (02/04/24 2324)  Pulse: 101 (02/04/24 2333)  Resp: 18 (02/04/24 2324)  BP: 128/75 (02/04/24 2324)  SpO2: 99 % (02/04/24 2333) Vital Signs (24h Range):  Temp:  [98.2 °F (36.8 °C)] 98.2 °F (36.8 °C)  Pulse:  [101-112] 101  Resp:  [18] 18  SpO2:  [99 %] 99 %  BP: (128)/(75) 128/75     Weight: 92.5 kg (204 lb)  Body mass index is 31.95 kg/m².    FHT: 135, reactive Cat 1 (reassuring)  TOCO:  none     Physical Exam:   Constitutional: Arlen Garrisonet is oriented to person, place, and time. Arlen A Hiram appears well-developed and well-nourished.  Non-toxic appearance. Arlen A Hiram does not appear ill. No distress.    HENT:   Head: Normocephalic and atraumatic.       Pulmonary/Chest: Effort normal. No respiratory distress.        Abdominal: Soft. Normal appearance. Arlen A Hiram exhibits no distension. There is no abdominal tenderness.             Musculoskeletal: Normal range of motion and moves all extremeties. No tenderness.       Neurological: Arlen A Hiram is alert and oriented to person, place, and time. No cranial nerve deficit. Coordination normal.    Skin: Arlen A Hiram is not diaphoretic.    Psychiatric: Arlen A Hiram has a normal mood and affect. Arlen A Hiram's behavior is normal. Judgment and thought content normal.        Significant Labs:  Lab Results   Component Value Date    GROUPTR B POS 12/14/2023    HEPBSAG Negative 08/26/2021       I have personallly reviewed all  pertinent lab results from the last 24 hours.

## 2024-02-05 NOTE — H&P
" [facility] - Emergency Dept (OB)  Obstetrics  History & Physical    Patient Name: Norma Denise  MRN: [id number]  Admission Date: 2024  Primary Care Provider: Bhupinder Patel FNP    Subjective:     Principal Problem:Pre-existing type 2 diabetes mellitus during pregnancy in third trimester    History of Present Illness:  23 yr  @ 32 wk 1 d presents to [facility] due to "elevated blood sugar" at home.  Pt states BS was over 200 this evening.  Pt is Type 2 diabetic managed by Dr. Juárez.  Pt is admittedly not compliant with insulin because she has difficulty understanding instructions.  Pt has appointment to see primary OB tomorrow.    Obstetric HPI:  Patient reports None contractions, active fetal movement, No vaginal bleeding , No loss of fluid     This pregnancy has been complicated by Type II DM and seizure disorder    OB History    Para Term  AB Living   3 1 1 0 1 1   SAB IAB Ectopic Multiple Live Births   1 0 0 0 1      # Outcome Date GA Lbr Fantasma/2nd Weight Sex Delivery Anes PTL Lv   3 Current            2 Term 2022 38w0d  3.742 kg (8 lb 4 oz) M Vag-Spont EPI N CRESENCIO   1 SAB 21     SAB   FD     Past Medical History:   Diagnosis Date    Adjustment disorder     Anemia     BEGAN WITH PREGNANCY OF LAST BABY, CURRENT    Anxiety     Asthma     CURRENT    Bipolar disorder 2008    Depression     Diabetes mellitus 2023    TYPE 2 DIABETIC    History of psychiatric hospitalization 2008    Hx of psychiatric care     Hypertension     ECLAMPSIA WITH LABOR    Psychiatric exam requested by authority 2008    Psychiatric problem     Seizure disorder     DIAGNOSED WHEN BORN    Sleep difficulties     Substance abuse     BEEN SOBER FOR 2 YEARS IN FEBRUARY    Suicide attempt     HAPPENS OFTEN, LAST ATTEMPT WAS      Past Surgical History:   Procedure Laterality Date    NO PAST SURGERIES         (Not in a hospital admission)      Review of patient's " allergies indicates:  No Known Allergies     Family History       Problem Relation (Age of Onset)    Bipolar disorder Mother, Sister    No Known Problems Father, Brother, Maternal Aunt, Paternal Aunt, Maternal Uncle, Paternal Uncle, Maternal Grandfather, Maternal Grandmother, Paternal Grandfather, Paternal Grandmother, Cousin    Schizophrenia Mother, Sister          Tobacco Use    Smoking status: Former     Types: Vaping with nicotine, Cigarettes     Start date: 2020     Quit date:      Years since quittin.0     Passive exposure: Never    Smokeless tobacco: Never   Substance and Sexual Activity    Alcohol use: Not Currently    Drug use: Not Currently     Frequency: 21.0 times per week     Types: Marijuana, Heroin    Sexual activity: Not Currently     Partners: Male     Review of Systems   Constitutional: Negative.    Respiratory: Negative.     Cardiovascular: Negative.    Gastrointestinal: Negative.    Endocrine: Positive for diabetes.   Genitourinary: Negative.    Musculoskeletal: Negative.    Integumentary:  Negative.   Neurological:  Positive for seizures.   Psychiatric/Behavioral:          Bipolar      Objective:     Vital Signs (Most Recent):  Temp: 98.2 °F (36.8 °C) (24)  Pulse: 101 (24)  Resp: 18 (24)  BP: 128/75 (24)  SpO2: 99 % (24) Vital Signs (24h Range):  Temp:  [98.2 °F (36.8 °C)] 98.2 °F (36.8 °C)  Pulse:  [101-112] 101  Resp:  [18] 18  SpO2:  [99 %] 99 %  BP: (128)/(75) 128/75     Weight: 92.5 kg (204 lb)  Body mass index is 31.95 kg/m².    FHT: 135, reactive Cat 1 (reassuring)  TOCO:  none     Physical Exam:   Constitutional: Arlen A Kosse is oriented to person, place, and time. Arlen A Kosse appears well-developed and well-nourished.  Non-toxic appearance. Arlen A Kosse does not appear ill. No distress.    HENT:   Head: Normocephalic and atraumatic.       Pulmonary/Chest: Effort normal. No respiratory distress.        Abdominal:  Soft. Normal appearance. Arlen Denise exhibits no distension. There is no abdominal tenderness.             Musculoskeletal: Normal range of motion and moves all extremeties. No tenderness.       Neurological: Arlen Denise is alert and oriented to person, place, and time. No cranial nerve deficit. Coordination normal.    Skin: Arlen Denise is not diaphoretic.    Psychiatric: Arlen Denise has a normal mood and affect. Arlen Denise's behavior is normal. Judgment and thought content normal.        Significant Labs:  Lab Results   Component Value Date    GROUPTR B POS 2023    HEPBSAG Negative 2021       I have personallly reviewed all pertinent lab results from the last 24 hours.  Assessment/Plan:     23 y.o. female  at 32w1d for:    * Pre-existing type 2 diabetes mellitus during pregnancy in third trimester  Blood sugar range appropriate @ 149.  No hospital intervention.  F/u with primary OB tomorrow as scheduled.    32 weeks gestation of pregnancy  Blood sugar range appropriate @ 149.  No hospital intervention.  F/u with primary OB tomorrow as scheduled.        Cb Villegas MD  Obstetrics  [facility] - Emergency Dept (OB)

## 2024-02-05 NOTE — HPI
"23 yr  @ 32 wk 1 d presents to [facility] due to "elevated blood sugar" at home.  Pt states BS was over 200 this evening.  Pt is Type 2 diabetic managed by Dr. Juárez.  Pt is admittedly not compliant with insulin because she has difficulty understanding instructions.  Pt has appointment to see primary OB tomorrow.  "

## 2024-02-05 NOTE — ASSESSMENT & PLAN NOTE
Blood sugar range appropriate @ 149.  No hospital intervention.  F/u with primary OB tomorrow as scheduled.

## 2024-02-07 PROBLEM — Z3A.32 32 WEEKS GESTATION OF PREGNANCY: Status: RESOLVED | Noted: 2024-02-04 | Resolved: 2024-02-07

## 2024-02-07 NOTE — PROGRESS NOTES
Maternal Fetal Medicine Follow Up    Subjective     Patient ID: [id number]    Chief Complaint: MFM F/U (The patient voiced no new problem or concerns at this time. //)      HPI: Norma Denise is a 24 y.o. adult  at 32w5d gestation with Estimated Date of Delivery: 3/30/24  who is here for follow  up consultation by NICOLETTE.    She has type 2 diabetes, diagnosed as a child. She is on insulin, 8 units of NPH AND 4 units of humalog (not 40 as put in the last note as a topographic error) in the morning,  and 52 units of NPH at bedtime. She has corrective formula of 1 unit of Humalog for every 8 mg over 140. On 10/19/2023, hemoglobin A1c was 5.5.  Patient had a normal fetal echocardiogram on 2023. She has history of shoulder dystocia in her last pregnancy.  Baby weighed 8 lb 4 oz and she reports had some nerve damage to 1 arm and does physical therapy.  She had preeclampsia in her last pregnancy. She has history of seizure disorder, diagnosed in childhood.  She is currently on Keppra 500 mg twice daily after report of seizure earlier in October.  She is also on folic acid 1 mg daily.  She had an appointment with Neurology on 2023 with Dr. High and was advised to continue Keppra 500 mg b.i.d. and folic acid daily.  She has increased BMI of 31 at consult visit.  She is on low-dose aspirin twice daily.  She has history of bipolar disorder and depression and is on Lexapro 10 mg daily.  She has history of anemia and is on oral hematinic therapy.  On 10/19/2023, H&H 9.2/30.4.  On 2023, iron level was 21, TIBC was 473, H&H 7.5/25.2, significantly decreased from previous assessment. She is s/p 2 doses of IV Ferrlecit due to significant iron deficiency anemia.  On 2024, H&H was 8.5/29.2 with significant improvement. She had elevated blood pressure reading x1 at last visit on 2024 for which preeclampsia labs were ordered and were reassuring with platelets 402, ALT 9, AST 12, creatinine 0.62, uric acid  4.0, .  Patient was noted to have cellulitis/pilonidal infection at the right upper gluteal cleft at visit on 1/24/2024.  She is s/p Cleocin 300 mg p.o. q.8 hours x7 days.  Patient is reports resolution of the infection.      Interval history since last [facility] visit: None.. She denies any leaking fluid, vaginal bleeding, contractions, decreased fetal movement. Denies headaches, visual disturbances, or epigastric pain.    Pregnancy complications include:   Patient Active Problem List   Diagnosis    Bipolar disease during pregnancy in third trimester    Pre-existing type 2 diabetes mellitus during pregnancy in third trimester    Seizure disorder during pregnancy in third trimester    H/O shoulder dystocia in prior pregnancy, currently pregnant    Hx of preeclampsia, prior pregnancy, currently pregnant, third trimester    At high risk for complications of intrauterine pregnancy (IUP)    Increased BMI affecting pregnancy in third trimester    Anemia during pregnancy in third trimester    Functional neurological symptom disorder with attacks or seizures    Localization-related (focal) (partial) idiopathic epilepsy and epileptic syndromes with seizures of localized onset, not intractable, without status epilepticus    Cellulitis right buttock    Polyhydramnios in third trimester        No changes to medical, surgical, family, social, or obstetric history.    Medications:  Current Outpatient Medications   Medication Instructions    albuterol (PROVENTIL/VENTOLIN HFA) 90 mcg/actuation inhaler 1-2 puffs, Inhalation, Every 6 hours PRN, Rescue    aspirin (ECOTRIN) 162 mg, Oral, Daily, Start at 12 weeks gestation.  At 36 weeks gestation, decrease to one 81mg tablet daily.    blood sugar diagnostic (TRUE METRIX GLUCOSE TEST STRIP) Strp USE TO TEST BLOOD GLUCOSE AS DIRECTED 6-8 X/DAY    butalbital-acetaminophen-caffeine -40 mg (FIORICET, ESGIC) -40 mg per tablet 1 tablet, Oral, Every 4 hours PRN    clindamycin  "(CLEOCIN) 300 mg, Oral, Every 8 hours    EScitalopram oxalate (LEXAPRO) 10 mg, Oral, Daily    folic acid (FOLVITE) 1 mg, Oral, Daily    insulin lispro (HUMALOG U-100 INSULIN) 100 unit/mL injection Inject 1 unit for every 8 mg over 140.  OK to substitute    insulin NPH (HUMULIN N NPH U-100 INSULIN) 100 unit/mL injection Inject 10 units in AM and 10 units at bedtime.  OK to substitute    levETIRAcetam (KEPPRA) 500 mg, Oral, 2 times daily    magnesium oxide (MAG-OX) 400 mg, Oral, Daily    mirtazapine (REMERON) 30 mg, Oral, Nightly    ondansetron (ZOFRAN-ODT) 4 mg, Oral, Every 6 hours PRN    prenatal vit no.124-iron-folic (PRENATAL VITAMIN) 27 mg iron- 800 mcg Tab 1 tablet, Oral, Daily    PRENATAL VITAMIN 27 mg iron- 0.8 mg Tab     riboflavin, vitamin B2, 400 mg Tab 1 tablet, Oral, Daily    TRUE METRIX GLUCOSE METER Misc USE TO CHECK BLOOD GLUCOSE FOUR TIMES DAILY.    TRUEPLUS INSULIN 1 mL 31 gauge x 5/16 Syrg Inject 1 syringe 4 x daily as needed.  OK to substitute    TRUEPLUS LANCETS 33 gauge Misc USE TO CHECK BLOOD GLUCOSE FOUR TIMES DAILY.       Review of Systems   12 point review of systems conducted, negative except as stated in the history of present illness. See HPI for details.      Objective     Visit Vitals  /79 (BP Location: Left arm, Patient Position: Sitting, BP Method: Medium (Automatic))   Pulse 102   Resp 20   Ht 5' 7" (1.702 m)   Wt 93.4 kg (206 lb)   LMP 07/01/2023 (Exact Date)   BMI 32.26 kg/m²        Physical Exam  Vitals and nursing note reviewed.   Constitutional:       Appearance: Normal appearance.      Comments: Increased BMI   HENT:      Head: Normocephalic and atraumatic.      Nose: Nose normal. No congestion.   Cardiovascular:      Rate and Rhythm: Normal rate.   Pulmonary:      Effort: Pulmonary effort is normal.   Skin:     Findings: No rash.   Neurological:      Mental Status: Arlen Denise is alert and oriented to person, place, and time.   Psychiatric:         Mood and Affect: Mood " normal.         Behavior: Behavior normal.         Thought Content: Thought content normal.         Judgment: Judgment normal.           ASSESSMENT/PLAN:     24 y.o.  female with IUP at 32w5d    Type 2 diabetes mellitus in pregnancy  There is upper normal fetal growth with an EFW of 1772 g at the 62% and the AC at the 94% on 2024.  AFV is normal. BPP is 8/8.     Risks associated with diabetes in pregnancy include higher risk for polyhydramnios, fetal macrosomia and  metabolic complications (hypoglycemia, hyperbilirubinemia, hypocalcemia, erythema).     Continue 2200 calorie ADA diet during pregnancy. It is recommended that she have 30 grams of carbohydrates with breakfast, and 60 grams with lunch and dinner. In addition, she should have three snacks in between meals with 15 grams of carbohydrates and at bedtime with 30 grams of carbohydrates.    Log reviewed.  Patient advised to adjust the dose of insulin to 8 units of NPH and 4 units of Humalog in the morning and 64 units of NPH at bedtime. Continue 1 unit of Humalog for every 8 mg of sugar over 140.    Patient was advised to check her sugar at 3:00 a.m. 1 time    She was advised to keep her blood sugars checks and to call me on Monday with her blood sugars. The need for strict blood sugar control with goals of treatment to keep pre-prandial blood sugars between 65 and 90 and 1 hr postprandial blood sugars less than 140 was reviewed.     Low dose aspirin as discussed.    Patient will continue fetal kick counts 3 times a day and start next week twice weekly testing.      History of shoulder dystocia in previous pregnancy, antepartum  It would be reasonable to consider a primary  if EFW is more than 8 lb (previous baby was 8 lb 4 oz), understanding the degree of error of ultrasound with potential lesser or higher actual fetal weight. Recommend limiting weight gain in pregnancy and adequate sugar control, which are risk factors for  recurrence.      Seizure disorder in pregnancy  I reviewed with her the association of seizure disorder with higher risk of anomalies even without any use of medication. I discussed that the risk of seizure outweigh any risk of medication and recommend her to continue current medication regimen per neurology (Keppra 50 mg twice daily), and continue Folic acid 1 mg daily. If she has any seizures, she needs to report that immediately.  It is also recommended to avoid driving or operating heavy/hazardous equipment until 6 months post last seizure.    There are risks to the fetus from maternal seizures. Fetal hypoxia may occur as a result of decreased placental blood flow or postictal apnea and there are risks of injury to the fetus, abruption, or miscarriage due to maternal trauma sustained during a seizure.    Additionally, the higher risk of fetal growth restriction with seizure disorder was previously addressed.  I recommend serial ultrasounds to monitor fetal growth. I recommend doing fetal kick counts throughout the second and third trimester. If evidence of fetal growth restriction or other complications occur, then more formal fetal surveillance will be needed.      Increased BMI in pregnancy  Body mass index is 32.26 kg/m².  With fluctuating weight but overall stable since November, continue healthy low caloric diet and follow diabetic diet.  Excess weight gain would be associated with gestational hypertension, gestational diabetes and adverse  outcomes, including fetal demise in utero.    With risk factors associated with increased BMI, she is to do fetal kick counts throughout the pregnancy (after 24 weeks).    It is important to lose weight after the pregnancy is over, especially before a future pregnancy was discussed. Breastfeeding may be an important tool in reducing the postpartum weight retention. Fetal risks were discussed with short term risk of fetal/ obesity and long term risk of  adolescent component of metabolic syndrome.      History of preeclampsia  With increased risk for recurrence, it was agreed to continue asa 81 mg BID until 34 6/7 weeks, then decrease to once daily until delivery.. Preeclampsia precautions reviewed.      Bipolar disorder in pregnancy  Reviewed risks with depression and risks/benefits of medication use in pregnancy.    Although discontinuing the medication for a few weeks before delivery  has been suggested, to avoid  withdrawal, the potential risks to mom and lack of proven benefit from this approach, makes continuing medication till delivery a reasonable option.With symptom control, reasonable to continue Lexapro 10 mg daily.     She was also advised to report any worsening of symptoms or SI/HI tendencies immediately to provider/ER for prompt intervention. She was advised to continue follow up care with her Mental Health provider.        Anemia in pregnancy  Anemia in pregnancy is associated with a 2-fold increased risk for  delivery and 3-fold increased risk for delivering a low birth weight baby.      Continue current supplementation. Recommend intermittent H/H throughout the pregnancy to assess response to supplementation and guide titration of dosing.      Elevated BP reading x1 without diagnosis of hypertension  BP Readings from Last 1 Encounters:   24 114/79     Blood pressure is normal today.  Patient has no symptoms.  Advised her that if if another elevation is noted this would confirm the diagnosis of gestational hypertension.    Preeclampsia labs were reassuring on 2024.  Preeclampsia warnings were given.  Patient was advised to come in immediately if she has any headaches, vision problems, epigastric pain, or decreased fetal movement at any time.       No seizure activity patient will continue taking the Keppra.  Continue daily aspirin.  We will plan to repeat a CBC next week to decide if additional IV iron will be needed.  I  adjusted dose of insulin as above.  Mild polyhydramnios with normal MCA Doppler with no significant anemia.  Discussed potential causes of polyhydramnios with diabetes being the most likely etiology in this situation.   labor instructions    Follow up in about 1 week (around 2/15/2024) for MFM follow-up.     Future Appointments   Date Time Provider Department Center   2024 11:00 AM Isma Payne MD [facility]   2/15/2024  1:30 PM Nolan Juárez MD [facility]   2/15/2024  1:30 PM ROOM 3, [facility]   3/11/2024  2:00 PM Zhanna High MD [facility]        [employer] involvement: Patient was evaluated and examined by Dr. Juárez. JAYCE Chan, helped in pre charting of part of note.    This note was created with the assistance of [employer] voice recognition software. There may be transcription errors as a result of using this technology, however minimal. Effort has been made to ensure accuracy of transcription, but any obvious errors or omissions should be clarified with the author of the document.

## 2024-02-08 ENCOUNTER — PROCEDURE VISIT (OUTPATIENT)
Dept: MATERNAL FETAL MEDICINE | Facility: CLINIC | Age: 24
End: 2024-02-08
Payer: MEDICAID

## 2024-02-08 ENCOUNTER — OFFICE VISIT (OUTPATIENT)
Dept: MATERNAL FETAL MEDICINE | Facility: CLINIC | Age: 24
End: 2024-02-08
Payer: MEDICAID

## 2024-02-08 VITALS
HEIGHT: 67 IN | DIASTOLIC BLOOD PRESSURE: 79 MMHG | HEART RATE: 102 BPM | BODY MASS INDEX: 32.33 KG/M2 | WEIGHT: 206 LBS | RESPIRATION RATE: 20 BRPM | SYSTOLIC BLOOD PRESSURE: 114 MMHG

## 2024-02-08 DIAGNOSIS — O40.3XX0 POLYHYDRAMNIOS IN THIRD TRIMESTER COMPLICATION, SINGLE OR UNSPECIFIED FETUS: ICD-10-CM

## 2024-02-08 DIAGNOSIS — G40.909 SEIZURE DISORDER DURING PREGNANCY IN THIRD TRIMESTER: Primary | ICD-10-CM

## 2024-02-08 DIAGNOSIS — O99.213 OBESITY AFFECTING PREGNANCY IN THIRD TRIMESTER, UNSPECIFIED OBESITY TYPE: ICD-10-CM

## 2024-02-08 DIAGNOSIS — O09.90 AT HIGH RISK FOR COMPLICATIONS OF INTRAUTERINE PREGNANCY (IUP): ICD-10-CM

## 2024-02-08 DIAGNOSIS — G40.909 SEIZURE DISORDER DURING PREGNANCY IN THIRD TRIMESTER: ICD-10-CM

## 2024-02-08 DIAGNOSIS — F31.9 BIPOLAR DISEASE DURING PREGNANCY IN THIRD TRIMESTER: ICD-10-CM

## 2024-02-08 DIAGNOSIS — O09.299 H/O SHOULDER DYSTOCIA IN PRIOR PREGNANCY, CURRENTLY PREGNANT: ICD-10-CM

## 2024-02-08 DIAGNOSIS — O09.293 HX OF PREECLAMPSIA, PRIOR PREGNANCY, CURRENTLY PREGNANT, THIRD TRIMESTER: ICD-10-CM

## 2024-02-08 DIAGNOSIS — O99.013 ANEMIA DURING PREGNANCY IN THIRD TRIMESTER: ICD-10-CM

## 2024-02-08 DIAGNOSIS — O99.353 SEIZURE DISORDER DURING PREGNANCY IN THIRD TRIMESTER: ICD-10-CM

## 2024-02-08 DIAGNOSIS — O99.353 SEIZURE DISORDER DURING PREGNANCY IN THIRD TRIMESTER: Primary | ICD-10-CM

## 2024-02-08 DIAGNOSIS — O99.343 BIPOLAR DISEASE DURING PREGNANCY IN THIRD TRIMESTER: ICD-10-CM

## 2024-02-08 DIAGNOSIS — O24.113 PRE-EXISTING TYPE 2 DIABETES MELLITUS DURING PREGNANCY IN THIRD TRIMESTER: ICD-10-CM

## 2024-02-08 PROCEDURE — 3078F DIAST BP <80 MM HG: CPT | Mod: CPTII,S$GLB,, | Performed by: OBSTETRICS & GYNECOLOGY

## 2024-02-08 PROCEDURE — 99214 OFFICE O/P EST MOD 30 MIN: CPT | Mod: TH,S$GLB,, | Performed by: OBSTETRICS & GYNECOLOGY

## 2024-02-08 PROCEDURE — 1159F MED LIST DOCD IN RCRD: CPT | Mod: CPTII,S$GLB,, | Performed by: OBSTETRICS & GYNECOLOGY

## 2024-02-08 PROCEDURE — 76819 FETAL BIOPHYS PROFIL W/O NST: CPT | Mod: S$GLB,,, | Performed by: OBSTETRICS & GYNECOLOGY

## 2024-02-08 PROCEDURE — 3074F SYST BP LT 130 MM HG: CPT | Mod: CPTII,S$GLB,, | Performed by: OBSTETRICS & GYNECOLOGY

## 2024-02-08 PROCEDURE — 3008F BODY MASS INDEX DOCD: CPT | Mod: CPTII,S$GLB,, | Performed by: OBSTETRICS & GYNECOLOGY

## 2024-02-08 PROCEDURE — 76821 MIDDLE CEREBRAL ARTERY ECHO: CPT | Mod: S$GLB,,, | Performed by: OBSTETRICS & GYNECOLOGY

## 2024-02-09 LAB — POCT GLUCOSE: 142 MG/DL (ref 70–110)

## 2024-02-12 DIAGNOSIS — G40.909 SEIZURE DISORDER DURING PREGNANCY IN THIRD TRIMESTER: Primary | ICD-10-CM

## 2024-02-12 DIAGNOSIS — O99.353 SEIZURE DISORDER DURING PREGNANCY IN THIRD TRIMESTER: Primary | ICD-10-CM

## 2024-02-12 DIAGNOSIS — O24.113 PRE-EXISTING TYPE 2 DIABETES MELLITUS DURING PREGNANCY IN THIRD TRIMESTER: ICD-10-CM

## 2024-02-12 DIAGNOSIS — O99.343 BIPOLAR DISEASE DURING PREGNANCY IN THIRD TRIMESTER: ICD-10-CM

## 2024-02-12 DIAGNOSIS — F31.9 BIPOLAR DISEASE DURING PREGNANCY IN THIRD TRIMESTER: ICD-10-CM

## 2024-02-12 DIAGNOSIS — O09.293 HX OF PREECLAMPSIA, PRIOR PREGNANCY, CURRENTLY PREGNANT, THIRD TRIMESTER: ICD-10-CM

## 2024-02-15 ENCOUNTER — PROCEDURE VISIT (OUTPATIENT)
Dept: MATERNAL FETAL MEDICINE | Facility: CLINIC | Age: 24
End: 2024-02-15
Payer: MEDICAID

## 2024-02-15 ENCOUNTER — LAB VISIT (OUTPATIENT)
Dept: LAB | Facility: HOSPITAL | Age: 24
End: 2024-02-15
Payer: MEDICAID

## 2024-02-15 ENCOUNTER — OFFICE VISIT (OUTPATIENT)
Dept: MATERNAL FETAL MEDICINE | Facility: CLINIC | Age: 24
End: 2024-02-15
Payer: MEDICAID

## 2024-02-15 VITALS
WEIGHT: 209.19 LBS | HEIGHT: 67 IN | DIASTOLIC BLOOD PRESSURE: 72 MMHG | HEART RATE: 99 BPM | SYSTOLIC BLOOD PRESSURE: 118 MMHG | BODY MASS INDEX: 32.83 KG/M2

## 2024-02-15 DIAGNOSIS — F31.9 BIPOLAR DISEASE DURING PREGNANCY IN THIRD TRIMESTER: ICD-10-CM

## 2024-02-15 DIAGNOSIS — G40.909 SEIZURE DISORDER DURING PREGNANCY IN THIRD TRIMESTER: ICD-10-CM

## 2024-02-15 DIAGNOSIS — O09.299 H/O SHOULDER DYSTOCIA IN PRIOR PREGNANCY, CURRENTLY PREGNANT: ICD-10-CM

## 2024-02-15 DIAGNOSIS — O99.213 OBESITY AFFECTING PREGNANCY IN THIRD TRIMESTER, UNSPECIFIED OBESITY TYPE: ICD-10-CM

## 2024-02-15 DIAGNOSIS — O99.013 ANEMIA DURING PREGNANCY IN THIRD TRIMESTER: ICD-10-CM

## 2024-02-15 DIAGNOSIS — G40.909 SEIZURE DISORDER DURING PREGNANCY IN THIRD TRIMESTER: Primary | ICD-10-CM

## 2024-02-15 DIAGNOSIS — O99.353 SEIZURE DISORDER DURING PREGNANCY IN THIRD TRIMESTER: ICD-10-CM

## 2024-02-15 DIAGNOSIS — O09.293 HX OF PREECLAMPSIA, PRIOR PREGNANCY, CURRENTLY PREGNANT, THIRD TRIMESTER: ICD-10-CM

## 2024-02-15 DIAGNOSIS — O99.343 BIPOLAR DISEASE DURING PREGNANCY IN THIRD TRIMESTER: ICD-10-CM

## 2024-02-15 DIAGNOSIS — O24.113 PRE-EXISTING TYPE 2 DIABETES MELLITUS DURING PREGNANCY IN THIRD TRIMESTER: ICD-10-CM

## 2024-02-15 DIAGNOSIS — O09.90 AT HIGH RISK FOR COMPLICATIONS OF INTRAUTERINE PREGNANCY (IUP): ICD-10-CM

## 2024-02-15 DIAGNOSIS — O99.353 SEIZURE DISORDER DURING PREGNANCY IN THIRD TRIMESTER: Primary | ICD-10-CM

## 2024-02-15 DIAGNOSIS — O40.3XX0 POLYHYDRAMNIOS IN THIRD TRIMESTER COMPLICATION, SINGLE OR UNSPECIFIED FETUS: ICD-10-CM

## 2024-02-15 PROBLEM — L02.31 CELLULITIS AND ABSCESS OF BUTTOCK: Status: RESOLVED | Noted: 2024-01-24 | Resolved: 2024-02-15

## 2024-02-15 PROBLEM — L03.317 CELLULITIS AND ABSCESS OF BUTTOCK: Status: RESOLVED | Noted: 2024-01-24 | Resolved: 2024-02-15

## 2024-02-15 LAB
ERYTHROCYTE [DISTWIDTH] IN BLOOD BY AUTOMATED COUNT: 20.1 % (ref 11.5–17)
HCT VFR BLD AUTO: 24.8 % (ref 42–52)
HGB BLD-MCNC: 7.5 G/DL (ref 14–18)
MCH RBC QN AUTO: 21.4 PG (ref 27–31)
MCHC RBC AUTO-ENTMCNC: 30.2 G/DL (ref 33–36)
MCV RBC AUTO: 70.7 FL (ref 80–94)
NRBC BLD AUTO-RTO: 0.9 %
PLATELET # BLD AUTO: 337 X10(3)/MCL (ref 130–400)
PMV BLD AUTO: 9.4 FL (ref 7.4–10.4)
RBC # BLD AUTO: 3.51 X10(6)/MCL
WBC # SPEC AUTO: 11.17 X10(3)/MCL (ref 4.5–11.5)

## 2024-02-15 PROCEDURE — 36415 COLL VENOUS BLD VENIPUNCTURE: CPT

## 2024-02-15 PROCEDURE — 1159F MED LIST DOCD IN RCRD: CPT | Mod: CPTII,S$GLB,, | Performed by: OBSTETRICS & GYNECOLOGY

## 2024-02-15 PROCEDURE — 3078F DIAST BP <80 MM HG: CPT | Mod: CPTII,S$GLB,, | Performed by: OBSTETRICS & GYNECOLOGY

## 2024-02-15 PROCEDURE — 1160F RVW MEDS BY RX/DR IN RCRD: CPT | Mod: CPTII,S$GLB,, | Performed by: OBSTETRICS & GYNECOLOGY

## 2024-02-15 PROCEDURE — 85027 COMPLETE CBC AUTOMATED: CPT

## 2024-02-15 PROCEDURE — 3008F BODY MASS INDEX DOCD: CPT | Mod: CPTII,S$GLB,, | Performed by: OBSTETRICS & GYNECOLOGY

## 2024-02-15 PROCEDURE — 76819 FETAL BIOPHYS PROFIL W/O NST: CPT | Mod: S$GLB,,, | Performed by: OBSTETRICS & GYNECOLOGY

## 2024-02-15 PROCEDURE — 3074F SYST BP LT 130 MM HG: CPT | Mod: CPTII,S$GLB,, | Performed by: OBSTETRICS & GYNECOLOGY

## 2024-02-15 PROCEDURE — 99214 OFFICE O/P EST MOD 30 MIN: CPT | Mod: TH,S$GLB,, | Performed by: OBSTETRICS & GYNECOLOGY

## 2024-02-15 NOTE — PROGRESS NOTES
Maternal Fetal Medicine Follow Up    Subjective     Patient ID: [id number]    Chief Complaint: mfm followup w/us      HPI: Norma Denise is a 24 y.o. adult  at 33w5d gestation with Estimated Date of Delivery: 3/30/24  who is here for follow  up consultation by M.    She has type 2 diabetes, diagnosed as a child. She is on insulin, 8 units of NPH AND 4 units of Humalog in the morning, and 64 units of NPH at bedtime. She has corrective formula of 1 unit of Humalog for every 8 mg over 140. On 10/19/2023, hemoglobin A1c was 5.5.  Patient had a normal fetal echocardiogram on 2023. She has history of shoulder dystocia in her last pregnancy.  Baby weighed 8 lb 4 oz and she reports had some nerve damage to 1 arm and does physical therapy.  She had preeclampsia in her last pregnancy. She has history of seizure disorder, diagnosed in childhood.  She is currently on Keppra 500 mg twice daily after report of seizure earlier in October.  She is also on folic acid 1 mg daily.  She had an appointment with Neurology on 2023 with Dr. High and was advised to continue Keppra 500 mg b.i.d. and folic acid daily.  She has increased BMI of 31 at consult visit.  She is on low-dose aspirin twice daily.  She has history of bipolar disorder and depression and is on Lexapro 10 mg daily.  She has history of anemia and is on oral hematinic therapy.  On 10/19/2023, H&H 9.2/30.4.  On 2023, iron level was 21, TIBC was 473, H&H 7.5/25.2, significantly decreased from previous assessment. She is s/p 2 doses of IV Ferrlecit due to significant iron deficiency anemia.  On 2024, H&H was 8.5/29.2 with significant improvement. She had elevated blood pressure reading x1 at last visit on 2024 for which preeclampsia labs were ordered and were reassuring with platelets 402, ALT 9, AST 12, creatinine 0.62, uric acid 4.0, .  She had mild polyhydramnios noted on last ultrasound with normal MCA Doppler.      Interval  history since last [facility] visit: None.. She denies any leaking fluid, vaginal bleeding, contractions, decreased fetal movement. Denies headaches, visual disturbances, or epigastric pain.    Pregnancy complications include:   Patient Active Problem List   Diagnosis    Bipolar disease during pregnancy in third trimester    Pre-existing type 2 diabetes mellitus during pregnancy in third trimester    Seizure disorder during pregnancy in third trimester    H/O shoulder dystocia in prior pregnancy, currently pregnant    Hx of preeclampsia, prior pregnancy, currently pregnant, third trimester    At high risk for complications of intrauterine pregnancy (IUP)    Increased BMI affecting pregnancy in third trimester    Anemia during pregnancy in third trimester    Functional neurological symptom disorder with attacks or seizures    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    Medications:  Current Outpatient Medications   Medication Instructions    albuterol (PROVENTIL/VENTOLIN HFA) 90 mcg/actuation inhaler 1-2 puffs, Inhalation, Every 6 hours PRN, Rescue    aspirin (ECOTRIN) 162 mg, Oral, Daily, Start at 12 weeks gestation.  At 36 weeks gestation, decrease to one 81mg tablet daily.    blood sugar diagnostic (TRUE METRIX GLUCOSE TEST STRIP) Strp USE TO TEST BLOOD GLUCOSE AS DIRECTED 6-8 X/DAY    butalbital-acetaminophen-caffeine -40 mg (FIORICET, ESGIC) -40 mg per tablet 1 tablet, Oral, Every 4 hours PRN    clindamycin (CLEOCIN) 300 mg, Oral, Every 8 hours    EScitalopram oxalate (LEXAPRO) 10 mg, Oral, Daily    folic acid (FOLVITE) 1 mg, Oral, Daily    insulin lispro (HUMALOG U-100 INSULIN) 100 unit/mL injection Inject 1 unit for every 8 mg over 140.  OK to substitute    insulin NPH (HUMULIN N NPH U-100 INSULIN) 100 unit/mL  "injection Inject 10 units in AM and 10 units at bedtime.  OK to substitute    levETIRAcetam (KEPPRA) 500 mg, Oral, 2 times daily    magnesium oxide (MAG-OX) 400 mg, Oral, Daily    mirtazapine (REMERON) 30 mg, Oral, Nightly    ondansetron (ZOFRAN-ODT) 4 mg, Oral, Every 6 hours PRN    prenatal vit no.124-iron-folic (PRENATAL VITAMIN) 27 mg iron- 800 mcg Tab 1 tablet, Oral, Daily    PRENATAL VITAMIN 27 mg iron- 0.8 mg Tab     riboflavin, vitamin B2, 400 mg Tab 1 tablet, Oral, Daily    TRUE METRIX GLUCOSE METER Misc USE TO CHECK BLOOD GLUCOSE FOUR TIMES DAILY.    TRUEPLUS INSULIN 1 mL 31 gauge x 5/16 Syrg Inject 1 syringe 4 x daily as needed.  OK to substitute    TRUEPLUS LANCETS 33 gauge Misc USE TO CHECK BLOOD GLUCOSE FOUR TIMES DAILY.       Review of Systems   12 point review of systems conducted, negative except as stated in the history of present illness. See HPI for details.      Objective     Visit Vitals  /72 (BP Location: Left arm, Patient Position: Sitting, BP Method: Large (Automatic))   Pulse 99   Ht 5' 7" (1.702 m)   Wt 94.9 kg (209 lb 3.2 oz)   LMP 2023 (Exact Date)   BMI 32.77 kg/m²        Physical Exam  Vitals and nursing note reviewed.   Constitutional:       Appearance: Normal appearance.      Comments: Increased BMI   HENT:      Head: Normocephalic and atraumatic.      Nose: Nose normal. No congestion.   Cardiovascular:      Rate and Rhythm: Normal rate.   Pulmonary:      Effort: Pulmonary effort is normal.   Skin:     Findings: No rash.   Neurological:      Mental Status: Arlen Denise is alert and oriented to person, place, and time.   Psychiatric:         Mood and Affect: Mood normal.         Behavior: Behavior normal.         Thought Content: Thought content normal.         Judgment: Judgment normal.           ASSESSMENT/PLAN:     24 y.o.  female with IUP at 33w5d    Type 2 diabetes mellitus in pregnancy  There is upper normal fetal growth with an EFW of 1772 g at the 62% and " the AC at the 94% on 2024.  AFV is normal. BPP is 8/8.     Risks associated with diabetes in pregnancy include higher risk for polyhydramnios, fetal macrosomia and  metabolic complications (hypoglycemia, hyperbilirubinemia, hypocalcemia, erythema).     Continue 2200 calorie ADA diet during pregnancy. It is recommended that she have 30 grams of carbohydrates with breakfast, and 60 grams with lunch and dinner. In addition, she should have three snacks in between meals with 15 grams of carbohydrates and at bedtime with 30 grams of carbohydrates.    Log reviewed.  Patient advised to adjust the dose of insulin to 8 units of NPH and 4 units of Humalog in the morning and 64 units of NPH at bedtime. Continue 1 unit of Humalog for every 8 mg of sugar over 140.    She was advised to keep her blood sugars bring log to each visit.  The need for strict blood sugar control with goals of treatment to keep pre-prandial blood sugars between 65 and 90 and 1 hr postprandial blood sugars less than 140 was reviewed.     Low dose aspirin as discussed.    Patient will continue fetal kick counts 3 times a day.  We will plan to do twice weekly fetal testing, alternating with primary OB, until delivery.      History of shoulder dystocia in previous pregnancy, antepartum  It would be reasonable to consider a primary  if EFW is more than 8 lb (previous baby was 8 lb 4 oz), understanding the degree of error of ultrasound with potential lesser or higher actual fetal weight. Recommend limiting weight gain in pregnancy and adequate sugar control, which are risk factors for recurrence.      Seizure disorder in pregnancy  I reviewed with her the association of seizure disorder with higher risk of anomalies even without any use of medication. I discussed that the risk of seizure outweigh any risk of medication and recommend her to continue current medication regimen per neurology (Keppra 50 mg twice daily), and continue Folic  acid 1 mg daily. If she has any seizures, she needs to report that immediately.  It is also recommended to avoid driving or operating heavy/hazardous equipment until 6 months post last seizure.    There are risks to the fetus from maternal seizures. Fetal hypoxia may occur as a result of decreased placental blood flow or postictal apnea and there are risks of injury to the fetus, abruption, or miscarriage due to maternal trauma sustained during a seizure.    Additionally, the higher risk of fetal growth restriction with seizure disorder was previously addressed.  I recommend serial ultrasounds to monitor fetal growth. I recommend doing fetal kick counts throughout the second and third trimester. If evidence of fetal growth restriction or other complications occur, then more formal fetal surveillance will be needed.      Increased BMI in pregnancy  Body mass index is 32.77 kg/m².  With fluctuating weight but overall about 14 lb gained all pregnancy, continue healthy low caloric diet and follow diabetic diet.  Excess weight gain would be associated with gestational hypertension, gestational diabetes and adverse  outcomes, including fetal demise in utero.    With risk factors associated with increased BMI, she is to do fetal kick counts throughout the pregnancy (after 24 weeks).    It is important to lose weight after the pregnancy is over, especially before a future pregnancy was discussed. Breastfeeding may be an important tool in reducing the postpartum weight retention. Fetal risks were discussed with short term risk of fetal/ obesity and long term risk of adolescent component of metabolic syndrome.      History of preeclampsia  With increased risk for recurrence, it was agreed to continue asa 81 mg BID until 34 6/7 weeks, then decrease to once daily until delivery.. Preeclampsia precautions reviewed.      Bipolar disorder in pregnancy  Reviewed risks with depression and risks/benefits of  medication use in pregnancy.    Although discontinuing the medication for a few weeks before delivery  has been suggested, to avoid  withdrawal, the potential risks to mom and lack of proven benefit from this approach, makes continuing medication till delivery a reasonable option.With symptom control, reasonable to continue Lexapro 10 mg daily.     She was also advised to report any worsening of symptoms or SI/HI tendencies immediately to provider/ER for prompt intervention. She was advised to continue follow up care with her Mental Health provider.        Anemia in pregnancy  Anemia in pregnancy is associated with a 2-fold increased risk for  delivery and 3-fold increased risk for delivering a low birth weight baby.      Continue current supplementation. Recommend intermittent H/H throughout the pregnancy to assess response to supplementation and guide titration of dosing.      Elevated BP reading x1 without diagnosis of hypertension  BP Readings from Last 1 Encounters:   02/15/24 118/72     Blood pressure is normal today.  Patient has no symptoms.  Advised her that if if another elevation is noted this would confirm the diagnosis of gestational hypertension.    Preeclampsia labs were reassuring on 2024.  Preeclampsia warnings were given.  Patient was advised to come in immediately if she has any headaches, vision problems, epigastric pain, or decreased fetal movement at any time.       Mild polyhydramnios, resolved  Amniotic fluid volume is normal today.    She declined genetic amniocentesis and the need for  evaluation was emphasized.She declined cfDNA      Polyhydramnios can occur if defective swallowing such as with micrognathia, CNS abnormalities, GI abnormalities, cardiac or other anomalies not seen and or chromosomal abnormalities, that increase  morbidities and mortality.     The higher-risk  labor in this setting of polyhydramnios was discussed. She was advised  to be attentive to any symptoms increase cramps, vaginal discharge, and spotting if they happen to go to be checked very early.   If no new risk factors and stable JONNATHAN, recommend delivery at 39 weeks gestation.      No depression symptoms.  No seizure.  Continue Keppra 500 b.i.d. along with folic acid 1 mg daily.  Continue Lexapro 10 mg daily.  Continue hematinic therapy.  I adjusted dose of insulin as the sugar is elevated in the morning with trend for excessive growth.     CBC 2/15/24, 7.5/24.8 worse, will arrange IV iron.  Discussed with Dr. Payne and will arrange to have a dose of Feraheme at the hospital.    Follow up in about 1 week (around 2/22/2024) for MFM follow-up, Repeat ultrasound, BPP.     Future Appointments   Date Time Provider Department Center   2/19/2024  8:30 AM Isma [facility] MD [facility]   2/22/2024  2:00 PM Radhika Whitley PA-C [facility]   2/22/2024  2:00 [facility]   3/11/2024  2:00 PM Zhanna High MD [facility]        [employer] involvement: Patient was evaluated and examined by Dr. Juárez. JAYCE Chan, helped in pre charting of part of note.    This note was created with the assistance of [employer] voice recognition software. There may be transcription errors as a result of using this technology, however minimal. Effort has been made to ensure accuracy of transcription, but any obvious errors or omissions should be clarified with the author of the document.

## 2024-02-18 DIAGNOSIS — O99.352 SEIZURE DISORDER DURING PREGNANCY IN SECOND TRIMESTER: ICD-10-CM

## 2024-02-18 DIAGNOSIS — G40.909 SEIZURE DISORDER DURING PREGNANCY IN SECOND TRIMESTER: ICD-10-CM

## 2024-02-19 ENCOUNTER — HOSPITAL ENCOUNTER (OUTPATIENT)
Facility: HOSPITAL | Age: 24
Discharge: HOME | End: 2024-02-19
Attending: STUDENT IN AN ORGANIZED HEALTH CARE EDUCATION/TRAINING PROGRAM | Admitting: STUDENT IN AN ORGANIZED HEALTH CARE EDUCATION/TRAINING PROGRAM
Payer: MEDICAID

## 2024-02-19 VITALS
BODY MASS INDEX: 32.96 KG/M2 | HEIGHT: 67 IN | WEIGHT: 210 LBS | OXYGEN SATURATION: 100 % | HEART RATE: 97 BPM | DIASTOLIC BLOOD PRESSURE: 61 MMHG | SYSTOLIC BLOOD PRESSURE: 108 MMHG

## 2024-02-19 DIAGNOSIS — D64.9 ANEMIA: ICD-10-CM

## 2024-02-19 PROCEDURE — 25000003 PHARM REV CODE 250: Performed by: STUDENT IN AN ORGANIZED HEALTH CARE EDUCATION/TRAINING PROGRAM

## 2024-02-19 PROCEDURE — 59025 FETAL NON-STRESS TEST: CPT

## 2024-02-19 PROCEDURE — 63600175 PHARM REV CODE 636 W HCPCS: Mod: JZ,JG | Performed by: STUDENT IN AN ORGANIZED HEALTH CARE EDUCATION/TRAINING PROGRAM

## 2024-02-19 RX ORDER — LEVETIRACETAM 500 MG/1
500 TABLET ORAL 2 TIMES DAILY
Qty: 60 TABLET | Refills: 3 | Status: SHIPPED | OUTPATIENT
Start: 2024-02-19 | End: 2024-06-10 | Stop reason: SDUPTHER

## 2024-02-19 RX ADMIN — FERUMOXYTOL 510 MG: 510 INJECTION INTRAVENOUS at 10:02

## 2024-02-19 NOTE — DISCHARGE SUMMARY
Discharge Summary       Primary Clinician: Isma Payen MD    Discharge Provider: Isma Payne MD    Admission date: 2/19/2024  9:35 AM    Discharge date: 2/19/2024 11:10 AM    Admit Dx:  Anemia [D64.9]     Discharge Dx:    same    Procedure:   IV iron, NST    Hospital Course:  Norma Denise is a 24 y.o. who presented for same-day for above procedure for above diagnosis and was discharged if meeting criteria.    Disposition: To home, self care    Follow Up: 1 week    Isma Payne MD  02/19/2024 12:13 PM

## 2024-02-20 DIAGNOSIS — O99.353 SEIZURE DISORDER DURING PREGNANCY IN THIRD TRIMESTER: Primary | ICD-10-CM

## 2024-02-20 DIAGNOSIS — G40.909 SEIZURE DISORDER DURING PREGNANCY IN THIRD TRIMESTER: Primary | ICD-10-CM

## 2024-02-20 DIAGNOSIS — O24.113 PRE-EXISTING TYPE 2 DIABETES MELLITUS DURING PREGNANCY IN THIRD TRIMESTER: ICD-10-CM

## 2024-02-22 ENCOUNTER — PROCEDURE VISIT (OUTPATIENT)
Dept: MATERNAL FETAL MEDICINE | Facility: CLINIC | Age: 24
End: 2024-02-22
Payer: MEDICAID

## 2024-02-22 ENCOUNTER — OFFICE VISIT (OUTPATIENT)
Dept: MATERNAL FETAL MEDICINE | Facility: CLINIC | Age: 24
End: 2024-02-22
Payer: MEDICAID

## 2024-02-22 VITALS
HEART RATE: 93 BPM | DIASTOLIC BLOOD PRESSURE: 85 MMHG | BODY MASS INDEX: 32.8 KG/M2 | SYSTOLIC BLOOD PRESSURE: 137 MMHG | WEIGHT: 209 LBS | HEIGHT: 67 IN

## 2024-02-22 DIAGNOSIS — F31.9 BIPOLAR DISEASE DURING PREGNANCY IN THIRD TRIMESTER: ICD-10-CM

## 2024-02-22 DIAGNOSIS — O09.293 HX OF PREECLAMPSIA, PRIOR PREGNANCY, CURRENTLY PREGNANT, THIRD TRIMESTER: ICD-10-CM

## 2024-02-22 DIAGNOSIS — O24.113 PRE-EXISTING TYPE 2 DIABETES MELLITUS DURING PREGNANCY IN THIRD TRIMESTER: ICD-10-CM

## 2024-02-22 DIAGNOSIS — O99.353 SEIZURE DISORDER DURING PREGNANCY IN THIRD TRIMESTER: Primary | ICD-10-CM

## 2024-02-22 DIAGNOSIS — G40.909 SEIZURE DISORDER DURING PREGNANCY IN THIRD TRIMESTER: ICD-10-CM

## 2024-02-22 DIAGNOSIS — G40.909 SEIZURE DISORDER DURING PREGNANCY IN THIRD TRIMESTER: Primary | ICD-10-CM

## 2024-02-22 DIAGNOSIS — O99.213 OBESITY AFFECTING PREGNANCY IN THIRD TRIMESTER, UNSPECIFIED OBESITY TYPE: ICD-10-CM

## 2024-02-22 DIAGNOSIS — O99.343 BIPOLAR DISEASE DURING PREGNANCY IN THIRD TRIMESTER: ICD-10-CM

## 2024-02-22 DIAGNOSIS — O99.353 SEIZURE DISORDER DURING PREGNANCY IN THIRD TRIMESTER: ICD-10-CM

## 2024-02-22 DIAGNOSIS — O09.299 H/O SHOULDER DYSTOCIA IN PRIOR PREGNANCY, CURRENTLY PREGNANT: ICD-10-CM

## 2024-02-22 DIAGNOSIS — O09.90 AT HIGH RISK FOR COMPLICATIONS OF INTRAUTERINE PREGNANCY (IUP): ICD-10-CM

## 2024-02-22 DIAGNOSIS — O99.013 ANEMIA DURING PREGNANCY IN THIRD TRIMESTER: ICD-10-CM

## 2024-02-22 DIAGNOSIS — O36.63X0 EXCESSIVE FETAL GROWTH AFFECTING MANAGEMENT OF PREGNANCY IN THIRD TRIMESTER, SINGLE OR UNSPECIFIED FETUS: ICD-10-CM

## 2024-02-22 PROCEDURE — 3075F SYST BP GE 130 - 139MM HG: CPT | Mod: CPTII,S$GLB,,

## 2024-02-22 PROCEDURE — 3008F BODY MASS INDEX DOCD: CPT | Mod: CPTII,S$GLB,,

## 2024-02-22 PROCEDURE — 99214 OFFICE O/P EST MOD 30 MIN: CPT | Mod: TH,S$GLB,,

## 2024-02-22 PROCEDURE — 1159F MED LIST DOCD IN RCRD: CPT | Mod: CPTII,S$GLB,,

## 2024-02-22 PROCEDURE — 3079F DIAST BP 80-89 MM HG: CPT | Mod: CPTII,S$GLB,,

## 2024-02-22 PROCEDURE — 1160F RVW MEDS BY RX/DR IN RCRD: CPT | Mod: CPTII,S$GLB,,

## 2024-02-22 PROCEDURE — 76819 FETAL BIOPHYS PROFIL W/O NST: CPT | Mod: S$GLB,,, | Performed by: OBSTETRICS & GYNECOLOGY

## 2024-02-22 PROCEDURE — 76816 OB US FOLLOW-UP PER FETUS: CPT | Mod: S$GLB,,, | Performed by: OBSTETRICS & GYNECOLOGY

## 2024-02-22 NOTE — PROGRESS NOTES
Maternal Fetal Medicine Follow Up    Subjective     Patient ID: [id number]    Chief Complaint: MFM follow up with US (Type 2 Diabetes.  Patient is having pelvic pain.  )      HPI: Norma Denise is a 24 y.o. adult  at 34w5d gestation with Estimated Date of Delivery: 3/30/24  who is here for follow  up consultation by MFM.    She has type 2 diabetes, diagnosed as a child. She is on insulin, 8 units of NPH and 4 units of Humalog in the morning, and 72 units of NPH at bedtime. She has corrective formula of 1 unit of Humalog for every 8 mg over 140. On 10/19/2023, hemoglobin A1c was 5.5.  Patient had a normal fetal echocardiogram on 2023. She has history of shoulder dystocia in her last pregnancy.  Baby weighed 8 lb 4 oz and she reports had some nerve damage to 1 arm and does physical therapy.  She had preeclampsia in her last pregnancy. She has history of seizure disorder, diagnosed in childhood.  She is currently on Keppra 500 mg twice daily after report of seizure earlier in October.  She is also on folic acid 1 mg daily.  She had an appointment with Neurology on 2023 with Dr. High and was advised to continue Keppra 500 mg b.i.d. and folic acid daily.  She has increased BMI of 31 at consult visit.  She is on low-dose aspirin twice daily.  She has history of bipolar disorder and depression and is on Lexapro 10 mg daily.  She has history of anemia and is on oral hematinic therapy.  On 10/19/2023, H&H 9.2/30.4.  On 2023, iron level was 21, TIBC was 473, H&H 7.5/25.2, significantly decreased from previous assessment. She is s/p 2 doses of IV Ferrlecit due to significant iron deficiency anemia.  On 2024, H&H was 8.5/29.2.  Follow-up CBC on 2/15/2024 showed worsening H&H at 7.5/24.8, and she received a dose of IV Feraheme on 2024.  She had elevated blood pressure reading x1 at last visit on 2024 for which preeclampsia labs were ordered and were reassuring with platelets 402, ALT  9, AST 12, creatinine 0.62, uric acid 4.0, .  She had mild polyhydramnios noted on previous ultrasound with normal MCA Doppler, that was improved on most recent assessment.      Interval history since last [facility] visit: None.. She denies any leaking fluid, vaginal bleeding, contractions, decreased fetal movement. Denies headaches, visual disturbances, or epigastric pain.    Pregnancy complications include:   Patient Active Problem List   Diagnosis    Bipolar disease during pregnancy in third trimester    Pre-existing type 2 diabetes mellitus during pregnancy in third trimester    Seizure disorder during pregnancy in third trimester    H/O shoulder dystocia in prior pregnancy, currently pregnant    Hx of preeclampsia, prior pregnancy, currently pregnant, third trimester    At high risk for complications of intrauterine pregnancy (IUP)    Increased BMI affecting pregnancy in third trimester    Anemia during pregnancy in third trimester    Functional neurological symptom disorder with attacks or seizures    Localization-related (focal) (partial) idiopathic epilepsy and epileptic syndromes with seizures of localized onset, not intractable, without status epilepticus    Excessive fetal growth affecting management of pregnancy in third trimester        No changes to medical, surgical, family, social, or obstetric history.    Medications:  Current Outpatient Medications   Medication Instructions    albuterol (PROVENTIL/VENTOLIN HFA) 90 mcg/actuation inhaler 1-2 puffs, Inhalation, Every 6 hours PRN, Rescue    aspirin (ECOTRIN) 162 mg, Oral, Daily, Start at 12 weeks gestation.  At 36 weeks gestation, decrease to one 81mg tablet daily.    blood sugar diagnostic (TRUE METRIX GLUCOSE TEST STRIP) Strp USE TO TEST BLOOD GLUCOSE AS DIRECTED 6-8 X/DAY    butalbital-acetaminophen-caffeine -40 mg (FIORICET, ESGIC) -40 mg per tablet 1 tablet, Oral, Every 4 hours PRN    clindamycin (CLEOCIN) 300 mg, Oral, Every 8 hours  "   EScitalopram oxalate (LEXAPRO) 10 mg, Oral, Daily    folic acid (FOLVITE) 1 mg, Oral, Daily    insulin lispro (HUMALOG U-100 INSULIN) 100 unit/mL injection Inject 1 unit for every 8 mg over 140.  OK to substitute    insulin NPH (HUMULIN N NPH U-100 INSULIN) 100 unit/mL injection Inject 10 units in AM and 10 units at bedtime.  OK to substitute    levETIRAcetam (KEPPRA) 500 mg, Oral, 2 times daily    magnesium oxide (MAG-OX) 400 mg, Oral, Daily    mirtazapine (REMERON) 30 mg, Oral, Nightly    ondansetron (ZOFRAN-ODT) 4 mg, Oral, Every 6 hours PRN    prenatal vit no.124-iron-folic (PRENATAL VITAMIN) 27 mg iron- 800 mcg Tab 1 tablet, Oral, Daily    PRENATAL VITAMIN 27 mg iron- 0.8 mg Tab     riboflavin, vitamin B2, 400 mg Tab 1 tablet, Oral, Daily    TRUE METRIX GLUCOSE METER Misc USE TO CHECK BLOOD GLUCOSE FOUR TIMES DAILY.    TRUEPLUS INSULIN 1 mL 31 gauge x 5/16 Syrg Inject 1 syringe 4 x daily as needed.  OK to substitute    TRUEPLUS LANCETS 33 gauge Misc USE TO CHECK BLOOD GLUCOSE FOUR TIMES DAILY.       Review of Systems   12 point review of systems conducted, negative except as stated in the history of present illness. See HPI for details.      Objective     Visit Vitals  /85 (BP Location: Left arm, Patient Position: Sitting, BP Method: Medium (Automatic))   Pulse 93   Ht 5' 7" (1.702 m)   Wt 94.8 kg (209 lb)   LMP 07/01/2023 (Exact Date)   BMI 32.73 kg/m²        Physical Exam  Vitals and nursing note reviewed.   Constitutional:       Appearance: Normal appearance.      Comments: Increased BMI   HENT:      Head: Normocephalic and atraumatic.      Nose: Nose normal. No congestion.   Cardiovascular:      Rate and Rhythm: Normal rate.   Pulmonary:      Effort: Pulmonary effort is normal.   Skin:     Findings: No rash.   Neurological:      Mental Status: Arlen Denise is alert and oriented to person, place, and time.   Psychiatric:         Mood and Affect: Mood normal.         Behavior: Behavior normal. "         Thought Content: Thought content normal.         Judgment: Judgment normal.           ASSESSMENT/PLAN:     24 y.o.  female with IUP at 34w5d    Type 2 diabetes mellitus in pregnancy  There is excessive fetal growth with an EFW of 3644 g at the 92% and the AC at the >99% on 2024.  AFV is normal. BPP is 8/8.     Risks associated with diabetes in pregnancy include higher risk for polyhydramnios, fetal macrosomia and  metabolic complications (hypoglycemia, hyperbilirubinemia, hypocalcemia, erythema).     Continue 2200 calorie ADA diet during pregnancy. It is recommended that she have 30 grams of carbohydrates with breakfast, and 60 grams with lunch and dinner. In addition, she should have three snacks in between meals with 15 grams of carbohydrates and at bedtime with 30 grams of carbohydrates.    Log reviewed.  Values are elevated across the board.  Estimated fetal weight is at the 92% with AC>99%.  Patient advised to adjust insulin regimen to 14 units of NPH and 4 units of Humalog in the morning, 8 units of Humalog for supper, and 90 units of NPH at bedtime. Continue 1 unit of Humalog for every 8 mg of sugar over 140.    She was advised to keep her blood sugars bring log to each visit.  The need for strict blood sugar control with goals of treatment to keep pre-prandial blood sugars between 65 and 90 and 1 hr postprandial blood sugars less than 140 was reviewed.     Low dose aspirin as discussed.    Patient will continue fetal kick counts 3 times a day.  We will plan to do twice weekly fetal testing, alternating with primary OB, until delivery.      History of shoulder dystocia in previous pregnancy, antepartum  It would be reasonable to consider a primary  if EFW is more than 8 lb (previous baby was 8 lb 4 oz), understanding the degree of error of ultrasound with potential lesser or higher actual fetal weight. Recommend limiting weight gain in pregnancy and adequate sugar control,  which are risk factors for recurrence.      Seizure disorder in pregnancy  I reviewed with her the association of seizure disorder with higher risk of anomalies even without any use of medication. I discussed that the risk of seizure outweigh any risk of medication and recommend her to continue current medication regimen per neurology (Keppra 50 mg twice daily), and continue Folic acid 1 mg daily. If she has any seizures, she needs to report that immediately.  It is also recommended to avoid driving or operating heavy/hazardous equipment until 6 months post last seizure.    There are risks to the fetus from maternal seizures. Fetal hypoxia may occur as a result of decreased placental blood flow or postictal apnea and there are risks of injury to the fetus, abruption, or miscarriage due to maternal trauma sustained during a seizure.    Additionally, the higher risk of fetal growth restriction with seizure disorder was previously addressed.  I recommend serial ultrasounds to monitor fetal growth. I recommend doing fetal kick counts throughout the second and third trimester. If evidence of fetal growth restriction or other complications occur, then more formal fetal surveillance will be needed.      Increased BMI in pregnancy  Body mass index is 32.73 kg/m².  With fluctuating weight but overall about 14 lb gained all pregnancy, continue healthy low caloric diet and follow diabetic diet.  Excess weight gain would be associated with gestational hypertension, gestational diabetes and adverse  outcomes, including fetal demise in utero.    With risk factors associated with increased BMI, she is to do fetal kick counts throughout the pregnancy (after 24 weeks).    It is important to lose weight after the pregnancy is over, especially before a future pregnancy was discussed. Breastfeeding may be an important tool in reducing the postpartum weight retention. Fetal risks were discussed with short term risk of  fetal/ obesity and long term risk of adolescent component of metabolic syndrome.      History of preeclampsia  With increased risk for recurrence, it was agreed to continue asa 81 mg BID until 34 6/7 weeks, then decrease to once daily until delivery.. Preeclampsia precautions reviewed.      Bipolar disorder in pregnancy  Reviewed risks with depression and risks/benefits of medication use in pregnancy.    Although discontinuing the medication for a few weeks before delivery  has been suggested, to avoid  withdrawal, the potential risks to mom and lack of proven benefit from this approach, makes continuing medication till delivery a reasonable option.With symptom control, reasonable to continue Lexapro 10 mg daily.     She was also advised to report any worsening of symptoms or SI/HI tendencies immediately to provider/ER for prompt intervention. She was advised to continue follow up care with her Mental Health provider.        Anemia in pregnancy  Anemia in pregnancy is associated with a 2-fold increased risk for  delivery and 3-fold increased risk for delivering a low birth weight baby.      Continue current supplementation. Recommend intermittent H/H throughout the pregnancy to assess response to supplementation and guide titration of dosing.      Elevated BP reading x1 without diagnosis of hypertension  BP Readings from Last 1 Encounters:   24 137/85     Blood pressure is normal today.  Patient has no symptoms.  Advised her that if if another elevation is noted this would confirm the diagnosis of gestational hypertension.    Preeclampsia labs were reassuring on 2024.  Preeclampsia warnings were given.  Patient was advised to come in immediately if she has any headaches, vision problems, epigastric pain, or decreased fetal movement at any time.       Mild polyhydramnios, resolved  Amniotic fluid volume is normal today.    She declined genetic amniocentesis and the need for   evaluation was emphasized.She declined cfDNA .     The higher-risk  labor in this setting of polyhydramnios was discussed. She was advised to be attentive to any symptoms increase cramps, vaginal discharge, and spotting if they happen to go to be checked very early.   If no new risk factors and stable JONNATHAN, recommend delivery at 39 weeks gestation.      Follow up in about 1 week (around 2024) for MFM follow-up, BPP.     Future Appointments   Date Time Provider Department Center   2024 11:00 AM Isma Payne MD [facility]   3/11/2024  2:00 PM Zhanna High MD [facility] NEURO Jomar Whitley PA-C     This note was created with the assistance of [employer] voice recognition software. There may be transcription errors as a result of using this technology, however minimal. Effort has been made to ensure accuracy of transcription, but any obvious errors or omissions should be clarified with the author of the document.

## 2024-02-27 DIAGNOSIS — G40.909 SEIZURE DISORDER DURING PREGNANCY IN THIRD TRIMESTER: Primary | ICD-10-CM

## 2024-02-27 DIAGNOSIS — O09.293 HX OF PREECLAMPSIA, PRIOR PREGNANCY, CURRENTLY PREGNANT, THIRD TRIMESTER: ICD-10-CM

## 2024-02-27 DIAGNOSIS — O99.353 SEIZURE DISORDER DURING PREGNANCY IN THIRD TRIMESTER: Primary | ICD-10-CM

## 2024-02-27 DIAGNOSIS — O24.113 PRE-EXISTING TYPE 2 DIABETES MELLITUS DURING PREGNANCY IN THIRD TRIMESTER: ICD-10-CM

## 2024-02-27 DIAGNOSIS — O99.013 ANEMIA DURING PREGNANCY IN THIRD TRIMESTER: ICD-10-CM

## 2024-02-27 LAB — PRENATAL STREP B CULTURE: NORMAL

## 2024-02-28 NOTE — PROGRESS NOTES
Maternal Fetal Medicine Follow Up    Subjective     Patient ID: [id number]    Chief Complaint: high risk pregnancy followup      HPI: Norma Denise is a 24 y.o. adult  at 35w5d gestation with Estimated Date of Delivery: 3/30/24  who is here for follow  up consultation by M.    She has type 2 diabetes, diagnosed as a child. She is on insulin, 14 units of NPH and 4 units of Humalog in the morning, 8 units of Humalog for supper, and 90 units of NPH at bedtime. She has corrective formula of 1 unit of Humalog for every 8 mg of sugar over 140. On 10/19/2023, hemoglobin A1c was 5.5.%  Patient had a normal fetal echocardiogram on 2023. She has history of shoulder dystocia in her last pregnancy.  Baby weighed 8 lb 4 oz and she reports had some nerve damage to 1 arm and does physical therapy.  She had preeclampsia in her last pregnancy. She is on low dose aspirin once daily, now. She has history of seizure disorder, diagnosed in childhood.  She is currently on Keppra 500 mg twice daily and folic acid 1 mg daily, last seizure 2023. She follows with Dr. High. She had increased BMI of 31 at consult visit.  She has history of bipolar disorder and depression and is on Lexapro 10 mg daily, feeling well at this time.  She has history of anemia and is on oral hematinic therapy. She received 2 doses of IV Ferrlecit and one dose of IV Feraheme on 2024. She had isolated elevated BP on 24 and had labs that were reassuring. She had mild polyhydramnios noted on previous ultrasound with normal MCA Doppler, that has been within normal limits on followup exams. There was excessive fetal growth noted on 24.           Interval history since last M visit: None.. She denies any leaking fluid, vaginal bleeding, contractions, decreased fetal movement. Denies headaches, visual disturbances, or epigastric pain.    Pregnancy complications include:   Patient Active Problem List   Diagnosis    Bipolar  disease during pregnancy in third trimester    Pre-existing type 2 diabetes mellitus during pregnancy in third trimester    Seizure disorder during pregnancy in third trimester    H/O shoulder dystocia in prior pregnancy, currently pregnant    Hx of preeclampsia, prior pregnancy, currently pregnant, third trimester    At high risk for complications of intrauterine pregnancy (IUP)    Increased BMI affecting pregnancy in third trimester    Anemia during pregnancy in third trimester    Functional neurological symptom disorder with attacks or seizures    Localization-related (focal) (partial) idiopathic epilepsy and epileptic syndromes with seizures of localized onset, not intractable, without status epilepticus    Polyhydramnios in third trimester    Excessive fetal growth affecting management of pregnancy in third trimester        No changes to medical, surgical, family, social, or obstetric history.    Medications:  Current Outpatient Medications   Medication Instructions    albuterol (PROVENTIL/VENTOLIN HFA) 90 mcg/actuation inhaler 1-2 puffs, Inhalation, Every 6 hours PRN, Rescue    aspirin (ECOTRIN) 162 mg, Oral, Daily, Start at 12 weeks gestation.  At 36 weeks gestation, decrease to one 81mg tablet daily.    blood sugar diagnostic (TRUE METRIX GLUCOSE TEST STRIP) Strp USE TO TEST BLOOD GLUCOSE AS DIRECTED 6-8 X/DAY    butalbital-acetaminophen-caffeine -40 mg (FIORICET, ESGIC) -40 mg per tablet 1 tablet, Oral, Every 4 hours PRN    clindamycin (CLEOCIN) 300 mg, Oral, Every 8 hours    EScitalopram oxalate (LEXAPRO) 10 mg, Oral, Daily    folic acid (FOLVITE) 1 mg, Oral, Daily    insulin lispro (HUMALOG U-100 INSULIN) 100 unit/mL injection Inject 1 unit for every 8 mg over 140.  OK to substitute    insulin NPH (HUMULIN N NPH U-100 INSULIN) 100 unit/mL injection Inject 10 units in AM and 10 units at bedtime.  OK to substitute    levETIRAcetam (KEPPRA) 500 mg, Oral, 2 times daily    magnesium oxide (MAG-OX)  "400 mg, Oral, Daily    mirtazapine (REMERON) 30 mg, Oral, Nightly    ondansetron (ZOFRAN-ODT) 4 mg, Oral, Every 6 hours PRN    prenatal vit no.124-iron-folic (PRENATAL VITAMIN) 27 mg iron- 800 mcg Tab 1 tablet, Oral, Daily    PRENATAL VITAMIN 27 mg iron- 0.8 mg Tab     riboflavin, vitamin B2, 400 mg Tab 1 tablet, Oral, Daily    TRUE METRIX GLUCOSE METER Misc USE TO CHECK BLOOD GLUCOSE FOUR TIMES DAILY.    TRUEPLUS INSULIN 1 mL 31 gauge x 5/16 Syrg Inject 1 syringe 4 x daily as needed.  OK to substitute    TRUEPLUS LANCETS 33 gauge Misc USE TO CHECK BLOOD GLUCOSE FOUR TIMES DAILY.       Review of Systems   12 point review of systems conducted, negative except as stated in the history of present illness. See HPI for details.      Objective     Visit Vitals  /74 (BP Location: Left arm, Patient Position: Sitting, BP Method: Medium (Automatic))   Pulse 96   Ht 5' 7" (1.702 m)   Wt 95.7 kg (211 lb)   LMP 2023 (Exact Date)   BMI 33.05 kg/m²        Physical Exam  Vitals and nursing note reviewed.   Constitutional:       Appearance: Normal appearance.      Comments: Increased BMI   HENT:      Head: Normocephalic and atraumatic.      Nose: Nose normal. No congestion.   Cardiovascular:      Rate and Rhythm: Normal rate.   Pulmonary:      Effort: Pulmonary effort is normal.   Skin:     Findings: No rash.   Neurological:      Mental Status: Arlen Denise is alert and oriented to person, place, and time.   Psychiatric:         Mood and Affect: Mood normal.         Behavior: Behavior normal.         Thought Content: Thought content normal.         Judgment: Judgment normal.           ASSESSMENT/PLAN:     24 y.o.  female with IUP at 35w5d    Type 2 diabetes mellitus in pregnancy  There is excessive fetal growth with an EFW of 3644 g at the 92% and the AC at the >99% on 2024.  AFV shows mild polyhydramnios. BPP is 8/8.     Risks associated with diabetes in pregnancy include higher risk for " polyhydramnios, fetal macrosomia and  metabolic complications (hypoglycemia, hyperbilirubinemia, hypocalcemia, erythema).     Continue 2200 calorie ADA diet during pregnancy.     Log reviewed.  Patient advised to adjust insulin regimen to 14 units of NPH and 4 units of Humalog in the morning, 8 units of Humalog for supper, and 100 units of NPH at bedtime. Continue 1 unit of Humalog for every 8 mg of sugar over 140.    She was advised to continue to check her blood sugars 4/day and bring log to each visit. Reviewed the importance strict blood sugar control with goals of treatment to keep pre-prandial blood sugars between 65 and 90 and 1 hr postprandial blood sugars less than 140 was reviewed.     Low dose aspirin as discussed.    Continue twice weekly fetal testing, alternating with primary OB, until delivery.  Reviewed importance of FKC 3/day and prn with instructions to immediately report any decreased fetal movement.      History of shoulder dystocia in previous pregnancy, antepartum  It would be reasonable to consider a primary  if EFW is more than 8 lb (previous baby was 8 lb 4 oz), understanding the degree of error of ultrasound with potential lesser or higher actual fetal weight. Recommend limiting weight gain in pregnancy and adequate sugar control, which are risk factors for recurrence.      Seizure disorder in pregnancy  Recommend continue current medication regimen per neurology (Keppra 500 mg twice daily), and continue Folic acid 1 mg daily. If she has any seizures, she needs to report that immediately.  It is also recommended to avoid driving or operating heavy/hazardous equipment until 6 months post last seizure.    Fetal surveillance as above.     Increased BMI in pregnancy  Body mass index is 33.05 kg/m². With reasonable weight gain during pregnancy about 60 being lb since initial prenatal visit, she was advised to continue a healthy low caloric diet, low-salt and diabetic diet.   Excess weight gain would be associated with gestational hypertension, worsening diabetes and adverse  outcomes, including fetal demise in utero.    Reviewed importance of FKC 3/day and prn with instructions to immediately report any decreased fetal movement.    It is important to lose weight after the pregnancy is over, especially before a future pregnancy. Breastfeeding may be an important tool in reducing the postpartum weight retention. Fetal risks were discussed with short term risk of fetal/ obesity and long term risk of adolescent component of metabolic syndrome.      History of preeclampsia  Continue low dose aspirin once daily until delivery. Preeclampsia precautions reviewed.      Bipolar disorder in pregnancy  Reviewed risks with depression and risks/benefits of medication use in pregnancy.    Although discontinuing the medication for a few weeks before delivery  has been suggested, to avoid  withdrawal, the potential risks to mom and lack of proven benefit from this approach, makes continuing medication till delivery a reasonable option.With symptom control, reasonable to continue Lexapro 10 mg daily.     She was also advised to report any worsening of symptoms or SI/HI tendencies immediately to provider/ER for prompt intervention. She was advised to continue follow up care with her Mental Health provider.        Anemia in pregnancy  Anemia in pregnancy is associated with a 2-fold increased risk for  delivery and 3-fold increased risk for delivering a low birth weight baby.      Continue current supplementation. Recommend intermittent H/H throughout the pregnancy to assess response to supplementation and guide titration of dosing.      Elevated BP reading x1 without diagnosis of hypertension  BP Readings from Last 1 Encounters:   24 111/74     Blood pressure is normal today.  Patient has no symptoms.  Advised her that if if another elevation is noted this would  confirm the diagnosis of gestational hypertension.    Preeclampsia labs were reassuring on 1/17/2024.  Preeclampsia warnings were reviewed. Patient was advised to come in immediately if she has any headaches, vision problems, epigastric pain, or decreased fetal movement at any time.       Mild polyhydramnios, 24.8 JONNATHAN  Expectant management.    Left lower quadrant discomfort.  Patient feels the discomfort all the time.  No palpable contractions.  No uterine tenderness.  Patient appeared to be crying from discomfort.  Elected to send her to the hospital to be monitored for contractions labor.  Discussed with Dr. Payne.       Follow up in about 1 week (around 3/7/2024) for BPP, MFM follow-up.     Future Appointments   Date Time Provider Department Center   3/4/2024  9:00 AM Isma Payne MD [facility]   3/11/2024  2:00 PM Zhanna High MD [facility] NEURO Jomar Un      Patient was evaluated and examined by Dr. Juárez. DONALD Hearn, helped in pre charting of part of note.    This note was created with the assistance of [employer] voice recognition software. There may be transcription errors as a result of using this technology, however minimal. Effort has been made to ensure accuracy of transcription, but any obvious errors or omissions should be clarified with the author of the document.

## 2024-02-29 ENCOUNTER — PROCEDURE VISIT (OUTPATIENT)
Dept: MATERNAL FETAL MEDICINE | Facility: CLINIC | Age: 24
End: 2024-02-29
Payer: MEDICAID

## 2024-02-29 ENCOUNTER — OFFICE VISIT (OUTPATIENT)
Dept: MATERNAL FETAL MEDICINE | Facility: CLINIC | Age: 24
End: 2024-02-29
Payer: MEDICAID

## 2024-02-29 VITALS
BODY MASS INDEX: 33.12 KG/M2 | HEART RATE: 96 BPM | WEIGHT: 211 LBS | DIASTOLIC BLOOD PRESSURE: 74 MMHG | SYSTOLIC BLOOD PRESSURE: 111 MMHG | HEIGHT: 67 IN

## 2024-02-29 DIAGNOSIS — O99.343 BIPOLAR DISEASE DURING PREGNANCY IN THIRD TRIMESTER: ICD-10-CM

## 2024-02-29 DIAGNOSIS — O24.113 PRE-EXISTING TYPE 2 DIABETES MELLITUS DURING PREGNANCY IN THIRD TRIMESTER: ICD-10-CM

## 2024-02-29 DIAGNOSIS — O99.213 OBESITY AFFECTING PREGNANCY IN THIRD TRIMESTER, UNSPECIFIED OBESITY TYPE: ICD-10-CM

## 2024-02-29 DIAGNOSIS — O99.353 SEIZURE DISORDER DURING PREGNANCY IN THIRD TRIMESTER: ICD-10-CM

## 2024-02-29 DIAGNOSIS — O09.293 HX OF PREECLAMPSIA, PRIOR PREGNANCY, CURRENTLY PREGNANT, THIRD TRIMESTER: ICD-10-CM

## 2024-02-29 DIAGNOSIS — O09.90 AT HIGH RISK FOR COMPLICATIONS OF INTRAUTERINE PREGNANCY (IUP): ICD-10-CM

## 2024-02-29 DIAGNOSIS — O36.63X0 EXCESSIVE FETAL GROWTH AFFECTING MANAGEMENT OF PREGNANCY IN THIRD TRIMESTER, SINGLE OR UNSPECIFIED FETUS: ICD-10-CM

## 2024-02-29 DIAGNOSIS — G40.909 SEIZURE DISORDER DURING PREGNANCY IN THIRD TRIMESTER: Primary | ICD-10-CM

## 2024-02-29 DIAGNOSIS — O99.353 SEIZURE DISORDER DURING PREGNANCY IN THIRD TRIMESTER: Primary | ICD-10-CM

## 2024-02-29 DIAGNOSIS — O99.013 ANEMIA DURING PREGNANCY IN THIRD TRIMESTER: ICD-10-CM

## 2024-02-29 DIAGNOSIS — G40.909 SEIZURE DISORDER DURING PREGNANCY IN THIRD TRIMESTER: ICD-10-CM

## 2024-02-29 DIAGNOSIS — F31.9 BIPOLAR DISEASE DURING PREGNANCY IN THIRD TRIMESTER: ICD-10-CM

## 2024-02-29 DIAGNOSIS — O09.299 H/O SHOULDER DYSTOCIA IN PRIOR PREGNANCY, CURRENTLY PREGNANT: ICD-10-CM

## 2024-02-29 PROCEDURE — 3074F SYST BP LT 130 MM HG: CPT | Mod: CPTII,S$GLB,, | Performed by: OBSTETRICS & GYNECOLOGY

## 2024-02-29 PROCEDURE — 99214 OFFICE O/P EST MOD 30 MIN: CPT | Mod: TH,S$GLB,, | Performed by: OBSTETRICS & GYNECOLOGY

## 2024-02-29 PROCEDURE — 3008F BODY MASS INDEX DOCD: CPT | Mod: CPTII,S$GLB,, | Performed by: OBSTETRICS & GYNECOLOGY

## 2024-02-29 PROCEDURE — 1160F RVW MEDS BY RX/DR IN RCRD: CPT | Mod: CPTII,S$GLB,, | Performed by: OBSTETRICS & GYNECOLOGY

## 2024-02-29 PROCEDURE — 76819 FETAL BIOPHYS PROFIL W/O NST: CPT | Mod: S$GLB,,, | Performed by: OBSTETRICS & GYNECOLOGY

## 2024-02-29 PROCEDURE — 3078F DIAST BP <80 MM HG: CPT | Mod: CPTII,S$GLB,, | Performed by: OBSTETRICS & GYNECOLOGY

## 2024-02-29 PROCEDURE — 1159F MED LIST DOCD IN RCRD: CPT | Mod: CPTII,S$GLB,, | Performed by: OBSTETRICS & GYNECOLOGY

## 2024-03-04 ENCOUNTER — HOSPITAL ENCOUNTER (INPATIENT)
Facility: HOSPITAL | Age: 24
LOS: 4 days | Discharge: HOME | End: 2024-03-08
Attending: STUDENT IN AN ORGANIZED HEALTH CARE EDUCATION/TRAINING PROGRAM | Admitting: STUDENT IN AN ORGANIZED HEALTH CARE EDUCATION/TRAINING PROGRAM
Payer: MEDICAID

## 2024-03-04 DIAGNOSIS — Z98.891 S/P CESAREAN SECTION: ICD-10-CM

## 2024-03-04 DIAGNOSIS — O36.8390 NON-REASSURING ELECTRONIC FETAL MONITORING TRACING: ICD-10-CM

## 2024-03-04 DIAGNOSIS — O60.00 PRETERM LABOR: ICD-10-CM

## 2024-03-04 LAB
ANISOCYTOSIS BLD QL SMEAR: ABNORMAL
BASOPHILS # BLD AUTO: 0.07 X10(3)/MCL
BASOPHILS NFR BLD AUTO: 0.9 %
EOSINOPHIL # BLD AUTO: 0.11 X10(3)/MCL (ref 0–0.9)
EOSINOPHIL NFR BLD AUTO: 1.3 %
ERYTHROCYTE [DISTWIDTH] IN BLOOD BY AUTOMATED COUNT: 29.4 % (ref 11.5–17)
GROUP & RH: NORMAL
HCT VFR BLD AUTO: 31.9 % (ref 42–52)
HGB BLD-MCNC: 9.2 G/DL (ref 14–18)
IMM GRANULOCYTES # BLD AUTO: 0.07 X10(3)/MCL (ref 0–0.04)
IMM GRANULOCYTES NFR BLD AUTO: 0.9 %
INDIRECT COOMBS: NORMAL
LYMPHOCYTES # BLD AUTO: 2.44 X10(3)/MCL (ref 0.6–4.6)
LYMPHOCYTES NFR BLD AUTO: 29.8 %
MACROCYTES BLD QL SMEAR: ABNORMAL
MCH RBC QN AUTO: 23.2 PG (ref 27–31)
MCHC RBC AUTO-ENTMCNC: 28.8 G/DL (ref 33–36)
MCV RBC AUTO: 80.4 FL (ref 80–94)
MICROCYTES BLD QL SMEAR: ABNORMAL
MONOCYTES # BLD AUTO: 0.56 X10(3)/MCL (ref 0.1–1.3)
MONOCYTES NFR BLD AUTO: 6.8 %
NEUTROPHILS # BLD AUTO: 4.95 X10(3)/MCL (ref 2.1–9.2)
NEUTROPHILS NFR BLD AUTO: 60.3 %
NRBC BLD AUTO-RTO: 0.9 %
PLATELET # BLD AUTO: 310 X10(3)/MCL (ref 130–400)
PLATELET # BLD EST: NORMAL 10*3/UL
PMV BLD AUTO: 10.4 FL (ref 7.4–10.4)
POCT GLUCOSE: 114 MG/DL (ref 70–110)
RBC # BLD AUTO: 3.97 X10(6)/MCL
SPECIMEN OUTDATE: NORMAL
T PALLIDUM AB SER QL: NONREACTIVE
WBC # SPEC AUTO: 8.2 X10(3)/MCL (ref 4.5–11.5)

## 2024-03-04 PROCEDURE — 99285 EMERGENCY DEPT VISIT HI MDM: CPT | Mod: 25

## 2024-03-04 PROCEDURE — 85025 COMPLETE CBC W/AUTO DIFF WBC: CPT | Performed by: STUDENT IN AN ORGANIZED HEALTH CARE EDUCATION/TRAINING PROGRAM

## 2024-03-04 PROCEDURE — 63600175 PHARM REV CODE 636 W HCPCS: Performed by: STUDENT IN AN ORGANIZED HEALTH CARE EDUCATION/TRAINING PROGRAM

## 2024-03-04 PROCEDURE — 86780 TREPONEMA PALLIDUM: CPT | Performed by: STUDENT IN AN ORGANIZED HEALTH CARE EDUCATION/TRAINING PROGRAM

## 2024-03-04 PROCEDURE — 86850 RBC ANTIBODY SCREEN: CPT | Performed by: STUDENT IN AN ORGANIZED HEALTH CARE EDUCATION/TRAINING PROGRAM

## 2024-03-04 PROCEDURE — 11000001 HC ACUTE MED/SURG PRIVATE ROOM

## 2024-03-04 RX ORDER — OXYTOCIN/RINGER'S LACTATE 30/500 ML
334 PLASTIC BAG, INJECTION (ML) INTRAVENOUS ONCE
Status: DISCONTINUED | OUTPATIENT
Start: 2024-03-04 | End: 2024-03-08 | Stop reason: HOSPADM

## 2024-03-04 RX ORDER — OXYTOCIN 10 [USP'U]/ML
10 INJECTION, SOLUTION INTRAMUSCULAR; INTRAVENOUS ONCE AS NEEDED
Status: DISCONTINUED | OUTPATIENT
Start: 2024-03-04 | End: 2024-03-05

## 2024-03-04 RX ORDER — CARBOPROST TROMETHAMINE 250 UG/ML
250 INJECTION, SOLUTION INTRAMUSCULAR
Status: DISCONTINUED | OUTPATIENT
Start: 2024-03-04 | End: 2024-03-05

## 2024-03-04 RX ORDER — OXYTOCIN/RINGER'S LACTATE 30/500 ML
95 PLASTIC BAG, INJECTION (ML) INTRAVENOUS ONCE
Status: DISCONTINUED | OUTPATIENT
Start: 2024-03-04 | End: 2024-03-08 | Stop reason: HOSPADM

## 2024-03-04 RX ORDER — CALCIUM CARBONATE 200(500)MG
500 TABLET,CHEWABLE ORAL 3 TIMES DAILY PRN
Status: DISCONTINUED | OUTPATIENT
Start: 2024-03-04 | End: 2024-03-08 | Stop reason: HOSPADM

## 2024-03-04 RX ORDER — METHYLERGONOVINE MALEATE 0.2 MG/ML
200 INJECTION INTRAVENOUS ONCE AS NEEDED
Status: DISCONTINUED | OUTPATIENT
Start: 2024-03-04 | End: 2024-03-05

## 2024-03-04 RX ORDER — OXYTOCIN/RINGER'S LACTATE 30/500 ML
334 PLASTIC BAG, INJECTION (ML) INTRAVENOUS ONCE AS NEEDED
Status: DISCONTINUED | OUTPATIENT
Start: 2024-03-04 | End: 2024-03-08 | Stop reason: HOSPADM

## 2024-03-04 RX ORDER — MISOPROSTOL 100 UG/1
800 TABLET ORAL ONCE AS NEEDED
Status: COMPLETED | OUTPATIENT
Start: 2024-03-04 | End: 2024-03-05

## 2024-03-04 RX ORDER — SIMETHICONE 80 MG
1 TABLET,CHEWABLE ORAL 4 TIMES DAILY PRN
Status: DISCONTINUED | OUTPATIENT
Start: 2024-03-04 | End: 2024-03-08 | Stop reason: HOSPADM

## 2024-03-04 RX ORDER — SODIUM CHLORIDE, SODIUM LACTATE, POTASSIUM CHLORIDE, CALCIUM CHLORIDE 600; 310; 30; 20 MG/100ML; MG/100ML; MG/100ML; MG/100ML
INJECTION, SOLUTION INTRAVENOUS CONTINUOUS
Status: DISCONTINUED | OUTPATIENT
Start: 2024-03-04 | End: 2024-03-08 | Stop reason: HOSPADM

## 2024-03-04 RX ORDER — LIDOCAINE HYDROCHLORIDE 10 MG/ML
10 INJECTION INFILTRATION; PERINEURAL ONCE AS NEEDED
Status: DISCONTINUED | OUTPATIENT
Start: 2024-03-04 | End: 2024-03-08 | Stop reason: HOSPADM

## 2024-03-04 RX ORDER — MUPIROCIN 20 MG/G
OINTMENT TOPICAL
OUTPATIENT
Start: 2024-03-04

## 2024-03-04 RX ORDER — DIPHENOXYLATE HYDROCHLORIDE AND ATROPINE SULFATE 2.5; .025 MG/1; MG/1
2 TABLET ORAL EVERY 6 HOURS PRN
Status: DISCONTINUED | OUTPATIENT
Start: 2024-03-04 | End: 2024-03-08 | Stop reason: HOSPADM

## 2024-03-04 RX ORDER — MISOPROSTOL 100 UG/1
800 TABLET ORAL ONCE AS NEEDED
Status: DISCONTINUED | OUTPATIENT
Start: 2024-03-04 | End: 2024-03-08 | Stop reason: HOSPADM

## 2024-03-04 RX ORDER — ONDANSETRON 4 MG/1
8 TABLET, ORALLY DISINTEGRATING ORAL EVERY 8 HOURS PRN
Status: DISCONTINUED | OUTPATIENT
Start: 2024-03-04 | End: 2024-03-05

## 2024-03-04 RX ORDER — OXYTOCIN/RINGER'S LACTATE 30/500 ML
95 PLASTIC BAG, INJECTION (ML) INTRAVENOUS ONCE AS NEEDED
Status: DISCONTINUED | OUTPATIENT
Start: 2024-03-04 | End: 2024-03-08 | Stop reason: HOSPADM

## 2024-03-04 RX ORDER — OXYTOCIN/RINGER'S LACTATE 30/500 ML
0-30 PLASTIC BAG, INJECTION (ML) INTRAVENOUS CONTINUOUS
Status: DISCONTINUED | OUTPATIENT
Start: 2024-03-04 | End: 2024-03-08 | Stop reason: HOSPADM

## 2024-03-04 RX ADMIN — SODIUM CHLORIDE, POTASSIUM CHLORIDE, SODIUM LACTATE AND CALCIUM CHLORIDE: 600; 310; 30; 20 INJECTION, SOLUTION INTRAVENOUS at 10:03

## 2024-03-04 RX ADMIN — Medication 2 MILLI-UNITS/MIN: at 11:03

## 2024-03-05 ENCOUNTER — ANESTHESIA (OUTPATIENT)
Dept: OBSTETRICS AND GYNECOLOGY | Facility: HOSPITAL | Age: 24
End: 2024-03-05
Payer: MEDICAID

## 2024-03-05 ENCOUNTER — ANESTHESIA EVENT (OUTPATIENT)
Dept: OBSTETRICS AND GYNECOLOGY | Facility: HOSPITAL | Age: 24
End: 2024-03-05
Payer: MEDICAID

## 2024-03-05 DIAGNOSIS — G40.909 SEIZURE DISORDER DURING PREGNANCY IN THIRD TRIMESTER: Primary | ICD-10-CM

## 2024-03-05 DIAGNOSIS — O24.113 PRE-EXISTING TYPE 2 DIABETES MELLITUS DURING PREGNANCY IN THIRD TRIMESTER: ICD-10-CM

## 2024-03-05 DIAGNOSIS — O99.353 SEIZURE DISORDER DURING PREGNANCY IN THIRD TRIMESTER: Primary | ICD-10-CM

## 2024-03-05 LAB
POCT GLUCOSE: 101 MG/DL (ref 70–110)
POCT GLUCOSE: 128 MG/DL (ref 70–110)
POCT GLUCOSE: 96 MG/DL (ref 70–110)
POCT GLUCOSE: 98
POCT GLUCOSE: 98 MG/DL (ref 70–110)

## 2024-03-05 PROCEDURE — 37000009 HC ANESTHESIA EA ADD 15 MINS: Performed by: STUDENT IN AN ORGANIZED HEALTH CARE EDUCATION/TRAINING PROGRAM

## 2024-03-05 PROCEDURE — 36004725 HC OB OR TIME LEV III - EA ADD 15 MIN: Performed by: STUDENT IN AN ORGANIZED HEALTH CARE EDUCATION/TRAINING PROGRAM

## 2024-03-05 PROCEDURE — 51702 INSERT TEMP BLADDER CATH: CPT

## 2024-03-05 PROCEDURE — 62326 NJX INTERLAMINAR LMBR/SAC: CPT | Performed by: ANESTHESIOLOGY

## 2024-03-05 PROCEDURE — 59514 CESAREAN DELIVERY ONLY: CPT | Mod: QX,CRNA,, | Performed by: NURSE ANESTHETIST, CERTIFIED REGISTERED

## 2024-03-05 PROCEDURE — 37000008 HC ANESTHESIA 1ST 15 MINUTES: Performed by: STUDENT IN AN ORGANIZED HEALTH CARE EDUCATION/TRAINING PROGRAM

## 2024-03-05 PROCEDURE — 25000003 PHARM REV CODE 250: Performed by: STUDENT IN AN ORGANIZED HEALTH CARE EDUCATION/TRAINING PROGRAM

## 2024-03-05 PROCEDURE — 25000003 PHARM REV CODE 250: Performed by: NURSE ANESTHETIST, CERTIFIED REGISTERED

## 2024-03-05 PROCEDURE — 11000001 HC ACUTE MED/SURG PRIVATE ROOM

## 2024-03-05 PROCEDURE — 63600175 PHARM REV CODE 636 W HCPCS: Performed by: NURSE ANESTHETIST, CERTIFIED REGISTERED

## 2024-03-05 PROCEDURE — 36004724 HC OB OR TIME LEV III - 1ST 15 MIN: Performed by: STUDENT IN AN ORGANIZED HEALTH CARE EDUCATION/TRAINING PROGRAM

## 2024-03-05 PROCEDURE — 27201108 HC SURGICEL

## 2024-03-05 PROCEDURE — 27200918 HC ALEXIS O RING

## 2024-03-05 PROCEDURE — 63600175 PHARM REV CODE 636 W HCPCS: Performed by: STUDENT IN AN ORGANIZED HEALTH CARE EDUCATION/TRAINING PROGRAM

## 2024-03-05 PROCEDURE — 27000492 HC SLEEVE, SCD T/L

## 2024-03-05 PROCEDURE — 72100002 HC LABOR CARE, 1ST 8 HOURS

## 2024-03-05 PROCEDURE — 71000033 HC RECOVERY, INTIAL HOUR: Performed by: STUDENT IN AN ORGANIZED HEALTH CARE EDUCATION/TRAINING PROGRAM

## 2024-03-05 PROCEDURE — 59514 CESAREAN DELIVERY ONLY: CPT | Mod: QY,ANES,, | Performed by: ANESTHESIOLOGY

## 2024-03-05 PROCEDURE — 71000039 HC RECOVERY, EACH ADD'L HOUR: Performed by: STUDENT IN AN ORGANIZED HEALTH CARE EDUCATION/TRAINING PROGRAM

## 2024-03-05 RX ORDER — ENOXAPARIN SODIUM 100 MG/ML
0.5 INJECTION SUBCUTANEOUS EVERY 12 HOURS
Status: DISCONTINUED | OUTPATIENT
Start: 2024-03-05 | End: 2024-03-08 | Stop reason: HOSPADM

## 2024-03-05 RX ORDER — SODIUM CHLORIDE 0.9 % (FLUSH) 0.9 %
10 SYRINGE (ML) INJECTION
Status: CANCELLED | OUTPATIENT
Start: 2024-03-05

## 2024-03-05 RX ORDER — IBUPROFEN 800 MG/1
800 TABLET ORAL EVERY 8 HOURS
Status: DISCONTINUED | OUTPATIENT
Start: 2024-03-06 | End: 2024-03-08 | Stop reason: HOSPADM

## 2024-03-05 RX ORDER — BISACODYL 10 MG/1
10 SUPPOSITORY RECTAL ONCE AS NEEDED
Status: DISCONTINUED | OUTPATIENT
Start: 2024-03-05 | End: 2024-03-08 | Stop reason: HOSPADM

## 2024-03-05 RX ORDER — MISOPROSTOL 100 UG/1
800 TABLET ORAL ONCE AS NEEDED
Status: DISCONTINUED | OUTPATIENT
Start: 2024-03-05 | End: 2024-03-08 | Stop reason: HOSPADM

## 2024-03-05 RX ORDER — FENTANYL CITRATE 50 UG/ML
25 INJECTION, SOLUTION INTRAMUSCULAR; INTRAVENOUS EVERY 5 MIN PRN
Status: CANCELLED | OUTPATIENT
Start: 2024-03-05

## 2024-03-05 RX ORDER — LIDOCAINE HYDROCHLORIDE 10 MG/ML
1 INJECTION, SOLUTION EPIDURAL; INFILTRATION; INTRACAUDAL; PERINEURAL ONCE
Status: CANCELLED | OUTPATIENT
Start: 2024-03-05 | End: 2024-03-05

## 2024-03-05 RX ORDER — ACETAMINOPHEN 500 MG
1000 TABLET ORAL EVERY 6 HOURS
Status: DISCONTINUED | OUTPATIENT
Start: 2024-03-05 | End: 2024-03-08 | Stop reason: HOSPADM

## 2024-03-05 RX ORDER — MORPHINE SULFATE 4 MG/ML
4 INJECTION, SOLUTION INTRAMUSCULAR; INTRAVENOUS
Status: DISCONTINUED | OUTPATIENT
Start: 2024-03-05 | End: 2024-03-08 | Stop reason: HOSPADM

## 2024-03-05 RX ORDER — OXYTOCIN/RINGER'S LACTATE 30/500 ML
95 PLASTIC BAG, INJECTION (ML) INTRAVENOUS ONCE
Status: DISCONTINUED | OUTPATIENT
Start: 2024-03-05 | End: 2024-03-08 | Stop reason: HOSPADM

## 2024-03-05 RX ORDER — KETOROLAC TROMETHAMINE 30 MG/ML
30 INJECTION, SOLUTION INTRAMUSCULAR; INTRAVENOUS EVERY 8 HOURS
Status: COMPLETED | OUTPATIENT
Start: 2024-03-05 | End: 2024-03-06

## 2024-03-05 RX ORDER — OXYCODONE HYDROCHLORIDE 5 MG/1
5 TABLET ORAL EVERY 4 HOURS PRN
Status: DISCONTINUED | OUTPATIENT
Start: 2024-03-05 | End: 2024-03-08 | Stop reason: HOSPADM

## 2024-03-05 RX ORDER — CARBOPROST TROMETHAMINE 250 UG/ML
250 INJECTION, SOLUTION INTRAMUSCULAR
Status: DISCONTINUED | OUTPATIENT
Start: 2024-03-05 | End: 2024-03-08 | Stop reason: HOSPADM

## 2024-03-05 RX ORDER — ADHESIVE BANDAGE
30 BANDAGE TOPICAL 2 TIMES DAILY PRN
Status: DISCONTINUED | OUTPATIENT
Start: 2024-03-06 | End: 2024-03-08 | Stop reason: HOSPADM

## 2024-03-05 RX ORDER — OXYCODONE HYDROCHLORIDE 5 MG/1
10 TABLET ORAL EVERY 4 HOURS PRN
Status: DISCONTINUED | OUTPATIENT
Start: 2024-03-05 | End: 2024-03-06

## 2024-03-05 RX ORDER — AMOXICILLIN 250 MG
1 CAPSULE ORAL NIGHTLY PRN
Status: DISCONTINUED | OUTPATIENT
Start: 2024-03-05 | End: 2024-03-08 | Stop reason: HOSPADM

## 2024-03-05 RX ORDER — ONDANSETRON 4 MG/1
8 TABLET, ORALLY DISINTEGRATING ORAL EVERY 8 HOURS PRN
Status: DISCONTINUED | OUTPATIENT
Start: 2024-03-05 | End: 2024-03-08 | Stop reason: HOSPADM

## 2024-03-05 RX ORDER — PROPOFOL 10 MG/ML
VIAL (ML) INTRAVENOUS
Status: DISCONTINUED | OUTPATIENT
Start: 2024-03-05 | End: 2024-03-05

## 2024-03-05 RX ORDER — METHYLERGONOVINE MALEATE 0.2 MG/ML
200 INJECTION INTRAVENOUS
Status: DISCONTINUED | OUTPATIENT
Start: 2024-03-05 | End: 2024-03-08 | Stop reason: HOSPADM

## 2024-03-05 RX ORDER — SUCCINYLCHOLINE CHLORIDE 20 MG/ML
INJECTION INTRAMUSCULAR; INTRAVENOUS
Status: DISCONTINUED | OUTPATIENT
Start: 2024-03-05 | End: 2024-03-05

## 2024-03-05 RX ORDER — FENTANYL CITRATE 50 UG/ML
INJECTION, SOLUTION INTRAMUSCULAR; INTRAVENOUS
Status: DISCONTINUED | OUTPATIENT
Start: 2024-03-05 | End: 2024-03-05

## 2024-03-05 RX ORDER — ONDANSETRON HYDROCHLORIDE 2 MG/ML
INJECTION, SOLUTION INTRAVENOUS
Status: DISCONTINUED | OUTPATIENT
Start: 2024-03-05 | End: 2024-03-05

## 2024-03-05 RX ORDER — OXYTOCIN 10 [USP'U]/ML
10 INJECTION, SOLUTION INTRAMUSCULAR; INTRAVENOUS ONCE AS NEEDED
Status: DISCONTINUED | OUTPATIENT
Start: 2024-03-05 | End: 2024-03-08 | Stop reason: HOSPADM

## 2024-03-05 RX ORDER — PRENATAL WITH FERROUS FUM AND FOLIC ACID 3080; 920; 120; 400; 22; 1.84; 3; 20; 10; 1; 12; 200; 27; 25; 2 [IU]/1; [IU]/1; MG/1; [IU]/1; MG/1; MG/1; MG/1; MG/1; MG/1; MG/1; UG/1; MG/1; MG/1; MG/1; MG/1
1 TABLET ORAL DAILY
Status: DISCONTINUED | OUTPATIENT
Start: 2024-03-05 | End: 2024-03-08 | Stop reason: HOSPADM

## 2024-03-05 RX ORDER — ACETAMINOPHEN 10 MG/ML
1000 INJECTION, SOLUTION INTRAVENOUS ONCE
Status: CANCELLED | OUTPATIENT
Start: 2024-03-05 | End: 2024-03-05

## 2024-03-05 RX ORDER — METFORMIN HYDROCHLORIDE 500 MG/1
500 TABLET ORAL 2 TIMES DAILY WITH MEALS
Status: DISCONTINUED | OUTPATIENT
Start: 2024-03-05 | End: 2024-03-07

## 2024-03-05 RX ORDER — DOCUSATE SODIUM 100 MG/1
200 CAPSULE, LIQUID FILLED ORAL 2 TIMES DAILY
Status: DISCONTINUED | OUTPATIENT
Start: 2024-03-05 | End: 2024-03-08 | Stop reason: HOSPADM

## 2024-03-05 RX ORDER — OXYTOCIN/RINGER'S LACTATE 30/500 ML
334 PLASTIC BAG, INJECTION (ML) INTRAVENOUS ONCE AS NEEDED
Status: DISCONTINUED | OUTPATIENT
Start: 2024-03-05 | End: 2024-03-08 | Stop reason: HOSPADM

## 2024-03-05 RX ORDER — ROCURONIUM BROMIDE 10 MG/ML
INJECTION, SOLUTION INTRAVENOUS
Status: DISCONTINUED | OUTPATIENT
Start: 2024-03-05 | End: 2024-03-05

## 2024-03-05 RX ORDER — OXYTOCIN 10 [USP'U]/ML
INJECTION, SOLUTION INTRAMUSCULAR; INTRAVENOUS
Status: DISCONTINUED | OUTPATIENT
Start: 2024-03-05 | End: 2024-03-05

## 2024-03-05 RX ORDER — HYDROMORPHONE HYDROCHLORIDE 2 MG/ML
0.2 INJECTION, SOLUTION INTRAMUSCULAR; INTRAVENOUS; SUBCUTANEOUS EVERY 5 MIN PRN
Status: CANCELLED | OUTPATIENT
Start: 2024-03-05

## 2024-03-05 RX ORDER — OXYTOCIN/RINGER'S LACTATE 30/500 ML
95 PLASTIC BAG, INJECTION (ML) INTRAVENOUS ONCE AS NEEDED
Status: DISCONTINUED | OUTPATIENT
Start: 2024-03-05 | End: 2024-03-08 | Stop reason: HOSPADM

## 2024-03-05 RX ORDER — SIMETHICONE 80 MG
1 TABLET,CHEWABLE ORAL EVERY 6 HOURS PRN
Status: DISCONTINUED | OUTPATIENT
Start: 2024-03-05 | End: 2024-03-08 | Stop reason: HOSPADM

## 2024-03-05 RX ORDER — DIPHENHYDRAMINE HCL 25 MG
25 CAPSULE ORAL EVERY 4 HOURS PRN
Status: DISCONTINUED | OUTPATIENT
Start: 2024-03-05 | End: 2024-03-08 | Stop reason: HOSPADM

## 2024-03-05 RX ADMIN — FENTANYL CITRATE 100 MCG: 50 INJECTION, SOLUTION INTRAMUSCULAR; INTRAVENOUS at 05:03

## 2024-03-05 RX ADMIN — MISOPROSTOL 800 MCG: 100 TABLET ORAL at 09:03

## 2024-03-05 RX ADMIN — SUCCINYLCHOLINE CHLORIDE 100 MG: 20 INJECTION, SOLUTION INTRAMUSCULAR; INTRAVENOUS at 05:03

## 2024-03-05 RX ADMIN — ROCURONIUM BROMIDE 5 MG: 10 INJECTION, SOLUTION INTRAVENOUS at 05:03

## 2024-03-05 RX ADMIN — ONDANSETRON 4 MG: 2 INJECTION INTRAMUSCULAR; INTRAVENOUS at 06:03

## 2024-03-05 RX ADMIN — OXYTOCIN 30 UNITS: 10 INJECTION, SOLUTION INTRAMUSCULAR; INTRAVENOUS at 05:03

## 2024-03-05 RX ADMIN — FENTANYL CITRATE 100 MCG: 50 INJECTION, SOLUTION INTRAMUSCULAR; INTRAVENOUS at 06:03

## 2024-03-05 RX ADMIN — DEXTROSE MONOHYDRATE 2 G: 50 INJECTION, SOLUTION INTRAVENOUS at 05:03

## 2024-03-05 RX ADMIN — INSULIN HUMAN 20 UNITS: 100 INJECTION, SUSPENSION SUBCUTANEOUS at 12:03

## 2024-03-05 RX ADMIN — METFORMIN HYDROCHLORIDE 500 MG: 500 TABLET, FILM COATED ORAL at 12:03

## 2024-03-05 RX ADMIN — DOCUSATE SODIUM 200 MG: 100 CAPSULE, LIQUID FILLED ORAL at 08:03

## 2024-03-05 RX ADMIN — ACETAMINOPHEN 1000 MG: 500 TABLET ORAL at 05:03

## 2024-03-05 RX ADMIN — KETOROLAC TROMETHAMINE 30 MG: 30 INJECTION, SOLUTION INTRAMUSCULAR at 07:03

## 2024-03-05 RX ADMIN — OXYCODONE HYDROCHLORIDE 10 MG: 5 TABLET ORAL at 01:03

## 2024-03-05 RX ADMIN — ENOXAPARIN SODIUM 50 MG: 100 INJECTION SUBCUTANEOUS at 11:03

## 2024-03-05 RX ADMIN — PROPOFOL 200 MG: 10 INJECTION, EMULSION INTRAVENOUS at 05:03

## 2024-03-05 RX ADMIN — AZITHROMYCIN MONOHYDRATE 500 MG: 500 INJECTION, POWDER, LYOPHILIZED, FOR SOLUTION INTRAVENOUS at 05:03

## 2024-03-05 RX ADMIN — KETOROLAC TROMETHAMINE 30 MG: 30 INJECTION, SOLUTION INTRAMUSCULAR at 08:03

## 2024-03-05 RX ADMIN — MORPHINE SULFATE 4 MG: 4 INJECTION, SOLUTION INTRAMUSCULAR; INTRAVENOUS at 09:03

## 2024-03-05 NOTE — OP NOTE
2024  6:04 AM     Section Operative Report    Indications: FITL    Pre-operative Diagnosis: 24 y.o. year old female at 36w3d IUP, indications as above,   Patient Active Problem List   Diagnosis    Bipolar disease during pregnancy in third trimester    Pre-existing type 2 diabetes mellitus during pregnancy in third trimester    Seizure disorder during pregnancy in third trimester    H/O shoulder dystocia in prior pregnancy, currently pregnant    Hx of preeclampsia, prior pregnancy, currently pregnant, third trimester    At high risk for complications of intrauterine pregnancy (IUP)    Increased BMI affecting pregnancy in third trimester    Anemia during pregnancy in third trimester    Functional neurological symptom disorder with attacks or seizures    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     Measured Blood Loss: 830 mL           Drains: Howard catheter    Procedure:  The patient was taken to the operating room after informed consent was given. Patient was placed in dorsal supine position with left lateral tilt. Fetal heart tones were doppled and were noted to be wnl. The patient was prepped and draped in the usual sterile manner. A time out was held and the patient's name and the procedure was confirmed. General anesthesia was obtained.       A Kris-Amaya incision was made and carried down through the  subcutaneous tissue to the fascia. Small bilateral incisions on the fascia were made using the scalpel and the fascial incision was extended laterally with manual traction cephalad and caudad. Midline of the rectus muscles were identified and the peritoneum entered bluntly and the muscles  laterally manually. Peritoneal incision was extended longitudinally with manual traction. Alex was inserted. The lower uterine segment was identified.    A low transverse uterine incision was made. The infant delivered from a vertex presentation. After the umbilical cord was doubly clamped and cut, cord blood was obtained for evaluation. The placenta was removed intact and appeared normal.  The uterus was exteriorized and cleaned of all clots and debris.  The uterine outline, tubes and ovaries appeared normal. The uterine incision was closed with running unlocked sutures of 0-vicryl. An imbricating layer was added using 1-monocryl. The uterus was internalized. Hemostasis was observed. The fascia was then reapproximated with running sutures of 0-vicryl. Subcutaneous adipose tissue was reapproximated and closed with running sutures of 2-0 plain gut. The skin was reapproximated with insorb.    Instrument, sponge, and needle counts were correct prior the abdominal closure and at the conclusion of the case.  Patient was taken to the recovery room in a stable condition.     Isma Payne MD 03/05/2024 6:04 AM

## 2024-03-05 NOTE — ANESTHESIA PROCEDURE NOTES
Intubation    Date/Time: 3/5/2024 5:33 AM    Performed by: Coy Wolff CRNA  Authorized by: Dariel Crocker DO    Intubation:     Induction:  Rapid sequence induction    Intubated:  N/a    Mask Ventilation:  Easy mask    Attempts:  1    Attempted By:  CRNA    Method of Intubation:  Direct    Blade:  Fine 2    Laryngeal View Grade: Grade I - full view of cords      Difficult Airway Encountered?: No      Complications:  None    Airway Device:  Oral endotracheal tube    Airway Device Size:  7.0    Style/Cuff Inflation:  Cuffed    Inflation Amount (mL):  8    Tube secured:  21    Secured at:  The lips    Placement Verified By:  Capnometry    Complicating Factors:  None    Findings Post-Intubation:  BS equal bilateral and atraumatic/condition of teeth unchanged  Notes:      Pt prepped, drapped, surgeon ready.  I/I w/o diff.  RSI

## 2024-03-05 NOTE — H&P
L&D= - History & Physical    Chief Complaint: labnor     HPI:      Norma Denise is a 24 y.o.  at 36w3d who presents today for evaluation of above chief complaint.    leaking of fluid (IUP @ 36 weeks 2 days gestation; c/o leaking of fluid since around 8pm)       Patient's last menstrual period was 2023 (exact date).     OB History    Para Term  AB Living   3 2 1 1 1 2   SAB IAB Ectopic Multiple Live Births   1 0 0 0 2      # Outcome Date GA Lbr Fantasma/2nd Weight Sex Delivery Anes PTL Lv   3  24 36w3d  4.14 kg (9 lb 2 oz) M CS-LTranv Gen Y CRESENCIO      Complications: Fetal Intolerance   2 Term 2022 38w0d  3.742 kg (8 lb 4 oz) M Vag-Spont EPI N CRESENCIO   1 SAB 21     SAB   FD       Past Medical History:   Diagnosis Date    Adjustment disorder     Anemia     BEGAN WITH PREGNANCY OF LAST BABY, CURRENT    Anxiety     Asthma     CURRENT    Bipolar disorder 2008    Depression     Diabetes mellitus 2023    TYPE 2 DIABETIC    History of psychiatric hospitalization 2008    Hx of psychiatric care     Hypertension     ECLAMPSIA WITH LABOR    Psychiatric exam requested by authority 2008    Psychiatric problem     Seizure disorder     DIAGNOSED WHEN BORN    Sleep difficulties     Substance abuse     BEEN SOBER FOR 2 YEARS IN FEBRUARY    Suicide attempt     HAPPENS OFTEN, LAST ATTEMPT WAS        Past Surgical History:   Procedure Laterality Date    NO PAST SURGERIES         Family History   Problem Relation Age of Onset    Bipolar disorder Mother     Schizophrenia Mother     No Known Problems Father     Bipolar disorder Sister     Schizophrenia Sister     No Known Problems Brother     No Known Problems Maternal Aunt     No Known Problems Paternal Aunt     No Known Problems Maternal Uncle     No Known Problems Paternal Uncle     No Known Problems Maternal Grandfather     No Known Problems Maternal Grandmother     No Known Problems Paternal  Grandfather     No Known Problems Paternal Grandmother     No Known Problems Cousin        Social History     Socioeconomic History    Marital status:     Number of children: 0   Occupational History    Occupation: unemployed   Tobacco Use    Smoking status: Former     Types: Vaping with nicotine, Cigarettes     Start date: 2020     Quit date:      Years since quittin.1     Passive exposure: Never    Smokeless tobacco: Never   Substance and Sexual Activity    Alcohol use: Not Currently    Drug use: Not Currently     Frequency: 21.0 times per week     Types: Marijuana, Heroin    Sexual activity: Not Currently     Partners: Male   Other Topics Concern    Patient feels they ought to cut down on drinking/drug use No    Patient annoyed by others criticizing their drinking/drug use No    Patient has felt bad or guilty about drinking/drug use No    Patient has had a drink/used drugs as an eye opener in the AM No   Social History Narrative    ** Merged History Encounter **         Pt is a 21 year old female who recently broke up with her  Boyfriend, whom she had been living with.   Pt reports that she does not attend school nor does she have a job. Pt states that she completed 11 th grade.       Current Facility-Administered Medications   Medication Dose Route Frequency Provider Last Rate Last Admin    acetaminophen tablet 1,000 mg  1,000 mg Oral Q6H Isma Payne MD        bisacodyL suppository 10 mg  10 mg Rectal Once PRN Isma Payne MD        calcium carbonate 200 mg calcium (500 mg) chewable tablet 500 mg  500 mg Oral TID PRN Isma Payne MD        carboprost injection 250 mcg  250 mcg Intramuscular Q15 Min PRN Isma Payne MD        diphenhydrAMINE capsule 25 mg  25 mg Oral Q4H PRN Isma Payne MD        diphenoxylate-atropine 2.5-0.025 mg per tablet 2 tablet  2 tablet Oral Q6H PRN Isma Payne MD        docusate sodium capsule 200 mg  200 mg Oral BID Isma Payne MD         enoxaparin injection 50 mg  0.5 mg/kg (Dosing Weight) Subcutaneous Q12H (prophylaxis, 0900/2100) Isma Payne MD        ketorolac injection 30 mg  30 mg Intravenous Q8H Isma Payne MD   30 mg at 03/05/24 0746    Followed by    [START ON 3/6/2024] ibuprofen tablet 800 mg  800 mg Oral Q8H Isma Payne MD        lactated ringers bolus 1,000 mL  1,000 mL Intravenous PRN Isma Payne MD        lactated ringers bolus 500 mL  500 mL Intravenous PRN Isma Payne MD        lactated ringers infusion   Intravenous Continuous Isma Payne  mL/hr at 03/04/24 2212 New Bag at 03/04/24 2212    lanolin cream   Topical (Top) PRN Isma Payne MD        LIDOcaine HCL 10 mg/ml (1%) injection 10 mL  10 mL Intradermal Once PRN Isma Payne MD        [START ON 3/6/2024] magnesium hydroxide 400 mg/5 ml suspension 2,400 mg  30 mL Oral BID PRN Isma Payne MD        measles, mumps and rubella vaccine 1,000-12,500 TCID50/0.5 mL injection 0.5 mL  0.5 mL Subcutaneous vaccine x 1 dose Isma Payne MD        metFORMIN tablet 500 mg  500 mg Oral BID WM Isma Payne MD        methylergonovine injection 200 mcg  200 mcg Intramuscular Q2H PRIsma Buchanan MD        miSOPROStoL tablet 800 mcg  800 mcg Oral Once PRIsma Buchanan MD        miSOPROStoL tablet 800 mcg  800 mcg Rectal Once PRIsma Buchanan MD        miSOPROStoL tablet 800 mcg  800 mcg Oral Once Isma Quigley MD        morphine injection 4 mg  4 mg Intravenous Q2H PRN Isma Payne MD   4 mg at 03/05/24 0939    ondansetron disintegrating tablet 8 mg  8 mg Oral Q8H PRN Isma Payne MD        oxyCODONE immediate release tablet 10 mg  10 mg Oral Q4H PRIsma Buchanan MD        oxyCODONE immediate release tablet 5 mg  5 mg Oral Q4H PRN Isma Payne MD        oxytocin 30 units in 500 mL lactated ringers infusion (non-titrating)  334 amarilis-units/min Intravenous Once Isma Payne MD        oxytocin 30 units in 500 mL lactated  ringers infusion (non-titrating)  95 amarilis-units/min Intravenous Once Isma Payne MD        oxytocin 30 units in 500 mL lactated ringers infusion (non-titrating)  334 amarilis-units/min Intravenous Once PRN Isma Payne MD        oxytocin 30 units in 500 mL lactated ringers infusion (non-titrating)  95 amarilis-units/min Intravenous Once PRN Isma Payne MD        oxytocin 30 units in 500 mL lactated ringers infusion (non-titrating)  95 amarilis-units/min Intravenous Once Isma Payne MD        oxytocin 30 units in 500 mL lactated ringers infusion (non-titrating)  334 amarilis-units/min Intravenous Once PRN Isma Payne MD        oxytocin 30 units in 500 mL lactated ringers infusion (non-titrating)  95 amarilis-units/min Intravenous Once PRN Isma Payne MD        oxytocin 30 units in 500 mL lactated ringers infusion (titrating)  0-30 amarilis-units/min Intravenous Continuous Isma Payne MD   Stopped at 03/05/24 0232    oxytocin injection 10 Units  10 Units Intramuscular Once PRN Isma Payne MD        prenatal vitamin oral tablet  1 tablet Oral Daily Isma Payne MD        senna-docusate 8.6-50 mg per tablet 1 tablet  1 tablet Oral Nightly PRN Isma Payne MD        simethicone chewable tablet 80 mg  1 tablet Oral QID PRN Isma Payne MD        simethicone chewable tablet 80 mg  1 tablet Oral Q6H PRN Isma Payne MD        Tdap (BOOSTRIX) vaccine injection 0.5 mL  0.5 mL Intramuscular vaccine x 1 dose Isma Payne MD        tranexamic acid (CYKLOKAPRON) 1,000 mg in sodium chloride 0.9 % 100 mL IVPB (MB+)  1,000 mg Intravenous Q30 Min PRN Isma Payne MD        tranexamic acid (CYKLOKAPRON) 1,000 mg in sodium chloride 0.9 % 100 mL IVPB (MB+)  1,000 mg Intravenous Once PRN Isma Payne MD           Review of patient's allergies indicates:  No Known Allergies      Physical Exam:      /81   Pulse 81   Temp 97.7 °F (36.5 °C) Comment: patient stated she felt cold, temp WDL; room temp  "adjusted  Resp 17   Ht 5' 5" (1.651 m)   Wt 97.1 kg (214 lb)   LMP 2023 (Exact Date)   SpO2 100%   Breastfeeding Unknown   BMI 35.61 kg/m²   Body mass index is 35.61 kg/m².     APPEARANCE: Well nourished, well developed, in no acute distress.  PSYCH: Appropriate mood and affect.  CHEST: Normal respiratory effort,  CV: Normal capillary refill.  ABDOMEN: Soft.  No tenderness or masses.    PELVIC:  4cm  EXTREMITIES: No edema       Results:         Assessment/Plan:     Active Problem List with Overview Notes    Diagnosis Date Noted    Excessive fetal growth affecting management of pregnancy in third trimester 2024    Polyhydramnios in third trimester 2024    Functional neurological symptom disorder with attacks or seizures 2023    Localization-related (focal) (partial) idiopathic epilepsy and epileptic syndromes with seizures of localized onset, not intractable, without status epilepticus 2023    Hx of preeclampsia, prior pregnancy, currently pregnant, third trimester 2023    At high risk for complications of intrauterine pregnancy (IUP) 2023    Increased BMI affecting pregnancy in third trimester 2023    Anemia during pregnancy in third trimester 2023    Seizure disorder during pregnancy in third trimester 10/19/2023    H/O shoulder dystocia in prior pregnancy, currently pregnant 10/19/2023    Pre-existing type 2 diabetes mellitus during pregnancy in third trimester 2023    Bipolar disease during pregnancy in third trimester 2021      Pt remained at 4cm from admission around 9pm until 0500.  Pt began having recurrent variables. Pt declined amnioinfusion and desired CS.    The  has been fully reviewed with the patient and written informed consent has been obtained.  Risks including, but not limited to, hemorrhage, infection (>5% risk of skin infection with lower risk of deeper infection requiring further surgery), injury to surrounding " structures (bowel/bladder/ureters/baby), risk of injury to pt have been explained. . The patient wishes to proceed.  All questions were answered.      Isma Payne MD  03/05/2024

## 2024-03-05 NOTE — ANESTHESIA POSTPROCEDURE EVALUATION
Anesthesia Post Evaluation    Patient: Norma Denise    Procedure(s) Performed: Procedure(s) (LRB):   SECTION (N/A)    Final Anesthesia Type: general      Patient location during evaluation: labor & delivery  Patient participation: Yes- Able to Participate  Level of consciousness: awake and alert  Post-procedure vital signs: reviewed and stable  Pain management: adequate  Airway patency: patent    PONV status at discharge: No PONV  Anesthetic complications: no      Cardiovascular status: blood pressure returned to baseline  Respiratory status: unassisted and spontaneous ventilation  Hydration status: euvolemic  Follow-up needed           No case tracking events are documented in the log.      Pain/Calixto Score: No data recorded

## 2024-03-05 NOTE — TRANSFER OF CARE
"Anesthesia Transfer of Care Note    Patient: Norma Denise    Procedure(s) Performed: Procedure(s) (LRB):   SECTION (N/A)    Patient location: Labor and Delivery    Anesthesia Type: general    Transport from OR: Transported from OR on room air with adequate spontaneous ventilation    Post pain: adequate analgesia    Post assessment: no apparent anesthetic complications    Post vital signs: stable    Level of consciousness: awake, alert and oriented    Nausea/Vomiting: no nausea/vomiting    Complications: none    Transfer of care protocol was followed      Last vitals: Visit Vitals  BP (!) 121/58   Pulse 62   Temp 36.8 °C (98.3 °F)   Resp 18   Ht 5' 5" (1.651 m)   Wt 97.1 kg (214 lb)   LMP 2023 (Exact Date)   SpO2 99%   BMI 35.61 kg/m²     "

## 2024-03-05 NOTE — ED PROVIDER NOTES
"[facility] NOTE     Admit Date: 3/4/2024  [facility] Physician: Danish Henning  Primary OBGYN: Dr. Payne    Admit Diagnosis/Chief Complaint: Leakage of Fluid and Contractions      Chief Complaint   Patient presents with    leaking of fluid     IUP @ 36 weeks 2 days gestation; c/o leaking of fluid since around 8pm       Hospital Course:  Norma Denise is a 24 y.o.  at 36w2d presents complaining of contractions  This IUP is complicated by T2DM.    Patient denies vaginal bleeding.  Fetal Movement: normal.    /73 (BP Location: Left arm)   Pulse 79   Temp 98.2 °F (36.8 °C) (Oral)   Resp 18   Ht 5' 5" (1.651 m)   Wt 97.1 kg (214 lb)   LMP 2023 (Exact Date)   BMI 35.61 kg/m²   Temp:  [98.2 °F (36.8 °C)] 98.2 °F (36.8 °C)  Pulse:  [73-79] 79  Resp:  [18] 18  BP: (123-125)/(73-80) 125/73    General: in no apparent distress  Abdominal: soft, nontender, nondistended, no abnormal masses, no epigastric pain FHT present  Back: lumbar tenderness absent   CVA tenderness none  Extremeties no redness or tenderness in the calves or thighs no edema    SSE:   SVE:      FHT:  Reactive  TOCO: Contractions every 5-8 min      LABS:   No results found for this or any previous visit (from the past 24 hour(s)).  [unfilled]     Imaging Results    None          ASSESMENT: Norma Denise is a 24 y.o.   at 36w2d piedad in labor  Admit to Labor and delivery   IV fluids   Epidural as needed   Admission labs   Discussed with private physician or on-call physician      Discharge Diagnosis/Clinical Impression**: Normal labor   Status:Stable                This note was created with the assistance of [employer] voice recognition software. There may be transcription errors as a result of using this technology however minimal. Effort has been made to assure accuracy of transcription but any obvious errors or omissions should be clarified with the author of the document.   "

## 2024-03-05 NOTE — ANESTHESIA PROCEDURE NOTES
Epidural    Patient location during procedure: OB  Block not for primary anesthetic.  Reason for block: at surgeon's request, post-op pain management   Post-op Pain Location: back  Start time: 3/5/2024 5:30 AM  Timeout: 3/5/2024 5:30 AM  End time: 3/5/2024 5:40 AM    Staffing  Performing Provider: Dariel Crocker DO  Authorizing Provider: Dariel Crocker DO    Staffing  Performed by: Dariel Crocker DO  Authorized by: Dariel Crocker DO        Preanesthetic Checklist  Completed: patient identified, IV checked, site marked, risks and benefits discussed, surgical consent, monitors and equipment checked, pre-op evaluation, timeout performed, anesthesia consent given, hand hygiene performed and patient being monitored  Preparation  Patient position: sitting  Prep: ChloraPrep  Patient monitoring: ECG, Pulse Ox and Blood Pressure Block not for primary anesthetic.  Epidural  Skin Anesthetic: lidocaine 1%  Skin Wheal: 1 mL  Administration type: single shot  Approach: midline  Interspace: L3-4    Injection technique: HORACE air and HORACE saline  Needle and Epidural Catheter  Needle type: Tuohy   Needle gauge: 17  Needle length: 3.5 inches    Needle localization: anatomical landmarks  Additional Notes  Epidural attempted x3 by anesthesiologist.  Epidural attempted x2 by CRNA  Not successful.  Pt wishes to not proceed with more attempts.  Pt wishes to go natural if possible.

## 2024-03-06 LAB
BASOPHILS # BLD AUTO: 0.06 X10(3)/MCL
BASOPHILS NFR BLD AUTO: 0.6 %
EOSINOPHIL # BLD AUTO: 0.15 X10(3)/MCL (ref 0–0.9)
EOSINOPHIL NFR BLD AUTO: 1.4 %
ERYTHROCYTE [DISTWIDTH] IN BLOOD BY AUTOMATED COUNT: 29 % (ref 11.5–17)
HCT VFR BLD AUTO: 26.7 % (ref 42–52)
HGB BLD-MCNC: 7.8 G/DL (ref 14–18)
IMM GRANULOCYTES # BLD AUTO: 0.06 X10(3)/MCL (ref 0–0.04)
IMM GRANULOCYTES NFR BLD AUTO: 0.6 %
LYMPHOCYTES # BLD AUTO: 1.24 X10(3)/MCL (ref 0.6–4.6)
LYMPHOCYTES NFR BLD AUTO: 11.4 %
MCH RBC QN AUTO: 23.6 PG (ref 27–31)
MCHC RBC AUTO-ENTMCNC: 29.2 G/DL (ref 33–36)
MCV RBC AUTO: 80.7 FL (ref 80–94)
MONOCYTES # BLD AUTO: 0.84 X10(3)/MCL (ref 0.1–1.3)
MONOCYTES NFR BLD AUTO: 7.7 %
NEUTROPHILS # BLD AUTO: 8.53 X10(3)/MCL (ref 2.1–9.2)
NEUTROPHILS NFR BLD AUTO: 78.3 %
NRBC BLD AUTO-RTO: 0 %
PLATELET # BLD AUTO: 267 X10(3)/MCL (ref 130–400)
PMV BLD AUTO: 10.1 FL (ref 7.4–10.4)
POCT GLUCOSE: 110 MG/DL (ref 70–110)
POCT GLUCOSE: 110 MG/DL (ref 70–110)
POCT GLUCOSE: 157 MG/DL (ref 70–110)
POCT GLUCOSE: 175 MG/DL (ref 70–110)
POCT GLUCOSE: 99 MG/DL (ref 70–110)
RBC # BLD AUTO: 3.31 X10(6)/MCL
WBC # SPEC AUTO: 10.88 X10(3)/MCL (ref 4.5–11.5)

## 2024-03-06 PROCEDURE — 63600175 PHARM REV CODE 636 W HCPCS: Performed by: STUDENT IN AN ORGANIZED HEALTH CARE EDUCATION/TRAINING PROGRAM

## 2024-03-06 PROCEDURE — 25000003 PHARM REV CODE 250: Performed by: STUDENT IN AN ORGANIZED HEALTH CARE EDUCATION/TRAINING PROGRAM

## 2024-03-06 PROCEDURE — 85025 COMPLETE CBC W/AUTO DIFF WBC: CPT | Performed by: STUDENT IN AN ORGANIZED HEALTH CARE EDUCATION/TRAINING PROGRAM

## 2024-03-06 PROCEDURE — 11000001 HC ACUTE MED/SURG PRIVATE ROOM

## 2024-03-06 RX ORDER — OXYCODONE HYDROCHLORIDE 5 MG/1
10 TABLET ORAL EVERY 4 HOURS PRN
Status: DISCONTINUED | OUTPATIENT
Start: 2024-03-06 | End: 2024-03-08 | Stop reason: HOSPADM

## 2024-03-06 RX ADMIN — KETOROLAC TROMETHAMINE 30 MG: 30 INJECTION, SOLUTION INTRAMUSCULAR at 03:03

## 2024-03-06 RX ADMIN — IBUPROFEN 800 MG: 800 TABLET, FILM COATED ORAL at 12:03

## 2024-03-06 RX ADMIN — ACETAMINOPHEN 1000 MG: 500 TABLET ORAL at 05:03

## 2024-03-06 RX ADMIN — SODIUM CHLORIDE 125 MG: 9 INJECTION, SOLUTION INTRAVENOUS at 09:03

## 2024-03-06 RX ADMIN — DOCUSATE SODIUM 200 MG: 100 CAPSULE, LIQUID FILLED ORAL at 08:03

## 2024-03-06 RX ADMIN — ENOXAPARIN SODIUM 50 MG: 100 INJECTION SUBCUTANEOUS at 08:03

## 2024-03-06 RX ADMIN — METFORMIN HYDROCHLORIDE 500 MG: 500 TABLET, FILM COATED ORAL at 08:03

## 2024-03-06 RX ADMIN — PRENATAL VITAMINS-IRON FUMARATE 27 MG IRON-FOLIC ACID 0.8 MG TABLET 1 TABLET: at 08:03

## 2024-03-06 RX ADMIN — ACETAMINOPHEN 1000 MG: 500 TABLET ORAL at 12:03

## 2024-03-06 RX ADMIN — OXYCODONE HYDROCHLORIDE 10 MG: 5 TABLET ORAL at 10:03

## 2024-03-06 RX ADMIN — IBUPROFEN 800 MG: 800 TABLET, FILM COATED ORAL at 08:03

## 2024-03-06 RX ADMIN — OXYCODONE HYDROCHLORIDE 10 MG: 5 TABLET ORAL at 03:03

## 2024-03-06 RX ADMIN — METFORMIN HYDROCHLORIDE 500 MG: 500 TABLET, FILM COATED ORAL at 05:03

## 2024-03-06 RX ADMIN — ENOXAPARIN SODIUM 50 MG: 100 INJECTION SUBCUTANEOUS at 09:03

## 2024-03-06 NOTE — PROGRESS NOTES
PostPartum Progress Note        Subjective:      Postpartum Day #1 after LTCS  .  Patient is without complaints. Lochia decreasing, less than menses.  Pain is well controlled. Patient is ambulating without lightheadedness. Passing flatus. Tolerating regular diet.    Objective:      Temp:  [97.7 °F (36.5 °C)-99 °F (37.2 °C)] 99 °F (37.2 °C)  Pulse:  [61-96] 94  Resp:  [16-20] 16  SpO2:  [100 %] 100 %  BP: (111-139)/(62-92) 133/88    Intake/Output Summary (Last 24 hours) at 3/6/2024 0714  Last data filed at 3/5/2024 0718  Gross per 24 hour   Intake --   Output 800 ml   Net -800 ml     Body mass index is 35.61 kg/m².    General: no acute distress  Abdomen: soft, appropriately tender,LTCS incision healing well   Extremities: non-tender, symmetric    Group & Rh   Date Value Ref Range Status   2024 B POS  Final     Recent Results (from the past 336 hour(s))   CBC with Differential    Collection Time: 24  6:13 AM   Result Value Ref Range    WBC 10.88 4.50 - 11.50 x10(3)/mcL    Hgb 7.8 (L) 14.0 - 18.0 g/dL    Hct 26.7 (L) 42.0 - 52.0 %    Platelet 267 130 - 400 x10(3)/mcL   CBC with Differential    Collection Time: 24 10:05 PM   Result Value Ref Range    WBC 8.20 4.50 - 11.50 x10(3)/mcL    Hgb 9.2 (L) 14.0 - 18.0 g/dL    Hct 31.9 (L) 42.0 - 52.0 %    Platelet 310 130 - 400 x10(3)/mcL          Assessment/Plan     24 y.o.  S/P , PPD # 1 - Doing appropriately   -Continue routine postpartum care  -Anticipated d/c POD#3-4    Active Problem List with Overview Notes    Diagnosis Date Noted    Excessive fetal growth affecting management of pregnancy in third trimester 2024    Polyhydramnios in third trimester 2024    Functional neurological symptom disorder with attacks or seizures 2023    Localization-related (focal) (partial) idiopathic epilepsy and epileptic syndromes with seizures of localized onset, not intractable, without status epilepticus 2023    Hx of  preeclampsia, prior pregnancy, currently pregnant, third trimester 11/01/2023    At high risk for complications of intrauterine pregnancy (IUP) 11/01/2023    Increased BMI affecting pregnancy in third trimester 11/01/2023    Anemia during pregnancy in third trimester 11/01/2023    Seizure disorder during pregnancy in third trimester 10/19/2023    H/O shoulder dystocia in prior pregnancy, currently pregnant 10/19/2023    Pre-existing type 2 diabetes mellitus during pregnancy in third trimester 07/13/2023    Bipolar disease during pregnancy in third trimester 08/25/2021       Acute blood loss on chronic anemia  -hgb now 7.8, pre-dellivery 9.2  -As the patient's hemoglobin was less than 8.0, IV iron will be started, as the hemoglobin at 6 weeks is higher, with associated improvements in fatigue, depression, and cognition.  (PMID [id number])       Isma Payne MD  03/06/2024 7:14 AM

## 2024-03-06 NOTE — PROGRESS NOTES
"    .. Admit Assessment     Patient Identification  Terrence Denise   :  3/5/2022  Admit Date:  3/5/2024  Attending Provider:  Tre Landis MD              Referral:    received case management consult for meconium drug screen assessment.          Verified her face sheet information:      Living Situation:      Resides at [address] [place] [address] in [place], [place] mother reports that occasionally they will stay at her father's residence:  [facility] [address] [facility] [address], phone: [phone] (home).             Met with mother and FOB in postpartum room, baby was sleeping in bassinet. Mother was sleeping upon SW entry to room and FOB awoke her for consult. Baby's Name: Kale Menjivar. FOB: Prakash Menjivar (involved). FOB reports that he has other children and mother reports that they have a 1y son that mother reports "comes and goes". Upon SW inquiring mother reports that her and FOB do have custody of 1y but that he mainly lives with her 'adoptive mother'. Parents did report that they have hx with DCFS with their 1y but report that they "took care of the case". Parents with observed cognitive delays, per baby's chart and update from RN parents with delayed feeding of baby for a feed but were reeducated on appropriate feeding times.            OB: Dr Kerry MOREIRA: parents could not recall name of pedi        Car Seat: confirmed, present in room      Safe Sleep: parents confirmed to have     Car seat Safety and Safe Sleep Discussed: (Resources Provided)     WIC/SNAP Benefits: (Resources Provided)     Breast Feed/Formula: formula      Other Children: parents with 1y son, FOB with multiple other children     DCFS Hx: both parents report hx of DCFS involvement         Social Concerns: parents both with observed cognitive delays, seemingly lack of stable housing as parents report that they 'come and go' between two addresses and mother also reports that their 1y 'comes and goes' with another " caretaker        History/Current Symptoms of Anxiety/Depression: mother reports hx of anxiety, PTSD, and bipolar disorder. Mother reports hx of medication usage but reports stopped meds during pregnancy but plans tor resume now that she delivered, reports that she is seen by [facility] in-home services   Discussed PPD and identifying symptoms and provided mom with PPD counseling resources and symptom brochure.       Identified Support:  FOB, father, 'adoptive mother'     History/Current Substance Use: mother reports hx of using 'anything and everything', reports her drug of choice was marijuana, also of note in chart mother with hx of heroine use, mother reports that as of 02/14/2024 that she is two years sober.      Indications of Abuse/Neglect: none        Emotional/Behavioral/Cognitive Issues: cognitive and potential observed developmental delays with parents      Current RX Prescriptions: 'a lot that I cannot remember'     Adequate Discharge Transportation: yes     Mom's UDS: no UDS on chart since '22     Baby's UDS: Neg     Baby's MDS: pending      DCFS status: online report made for at home follow up due to staff concerns of cogntive delays with parents as well as questionable stability with 1y and housing.         Plan: will follow MDS results and make follow up DCFS report as necessary      Patient/caregiver engaged in treatment planning process.      providing psychosocial and supportive counseling, resources, education, assistance and discharge planning as appropriate.  Patient/caregiver state understanding of  available resources,  following, remains available.        Patient/Caregiver informed of right to choose providers or agencies.  Patient/Caregiver provides permission to release any necessary information to [facility] and to Non-[facility] agencies as needed to facilitate patient care, treatment planning, and patient discharge planning.  Written and verbal resources  provided.

## 2024-03-06 NOTE — PLAN OF CARE
Problem:  Fall Injury Risk  Goal: Absence of Fall, Infant Drop and Related Injury  Outcome: Ongoing, Progressing     Problem: Adult Inpatient Plan of Care  Goal: Plan of Care Review  Outcome: Ongoing, Progressing  Goal: Patient-Specific Goal (Individualized)  Outcome: Ongoing, Progressing  Goal: Absence of Hospital-Acquired Illness or Injury  Outcome: Ongoing, Progressing  Goal: Optimal Comfort and Wellbeing  Outcome: Ongoing, Progressing  Goal: Readiness for Transition of Care  Outcome: Ongoing, Progressing     Problem: Diabetes Comorbidity  Goal: Blood Glucose Level Within Targeted Range  Outcome: Ongoing, Progressing     Problem: Infection  Goal: Absence of Infection Signs and Symptoms  Outcome: Ongoing, Progressing     Problem: Adjustment to Role Transition (Postpartum  Delivery)  Goal: Successful Maternal Role Transition  Outcome: Ongoing, Progressing     Problem: Bleeding (Postpartum  Delivery)  Goal: Hemostasis  Outcome: Ongoing, Progressing     Problem: Infection (Postpartum  Delivery)  Goal: Absence of Infection Signs and Symptoms  Outcome: Ongoing, Progressing     Problem: Pain (Postpartum  Delivery)  Goal: Acceptable Pain Control  Outcome: Ongoing, Progressing     Problem: Postoperative Nausea and Vomiting (Postpartum  Delivery)  Goal: Nausea and Vomiting Relief  Outcome: Ongoing, Progressing     Problem: Postoperative Urinary Retention (Postpartum  Delivery)  Goal: Effective Urinary Elimination  Outcome: Ongoing, Progressing

## 2024-03-07 LAB
POCT GLUCOSE: 109 MG/DL (ref 70–110)
POCT GLUCOSE: 113 MG/DL (ref 70–110)
POCT GLUCOSE: 113 MG/DL (ref 70–110)

## 2024-03-07 PROCEDURE — 25000003 PHARM REV CODE 250: Performed by: STUDENT IN AN ORGANIZED HEALTH CARE EDUCATION/TRAINING PROGRAM

## 2024-03-07 PROCEDURE — 63600175 PHARM REV CODE 636 W HCPCS: Performed by: STUDENT IN AN ORGANIZED HEALTH CARE EDUCATION/TRAINING PROGRAM

## 2024-03-07 PROCEDURE — 11000001 HC ACUTE MED/SURG PRIVATE ROOM

## 2024-03-07 RX ORDER — METFORMIN HYDROCHLORIDE 500 MG/1
1000 TABLET ORAL 2 TIMES DAILY WITH MEALS
Status: DISCONTINUED | OUTPATIENT
Start: 2024-03-07 | End: 2024-03-08 | Stop reason: HOSPADM

## 2024-03-07 RX ADMIN — SODIUM CHLORIDE 125 MG: 9 INJECTION, SOLUTION INTRAVENOUS at 07:03

## 2024-03-07 RX ADMIN — IBUPROFEN 800 MG: 800 TABLET, FILM COATED ORAL at 01:03

## 2024-03-07 RX ADMIN — PRENATAL VITAMINS-IRON FUMARATE 27 MG IRON-FOLIC ACID 0.8 MG TABLET 1 TABLET: at 07:03

## 2024-03-07 RX ADMIN — ENOXAPARIN SODIUM 50 MG: 100 INJECTION SUBCUTANEOUS at 07:03

## 2024-03-07 RX ADMIN — IBUPROFEN 800 MG: 800 TABLET, FILM COATED ORAL at 08:03

## 2024-03-07 RX ADMIN — ACETAMINOPHEN 1000 MG: 500 TABLET ORAL at 05:03

## 2024-03-07 RX ADMIN — METFORMIN HYDROCHLORIDE 1000 MG: 500 TABLET, FILM COATED ORAL at 05:03

## 2024-03-07 RX ADMIN — IBUPROFEN 800 MG: 800 TABLET, FILM COATED ORAL at 05:03

## 2024-03-07 RX ADMIN — DOCUSATE SODIUM 200 MG: 100 CAPSULE, LIQUID FILLED ORAL at 07:03

## 2024-03-07 RX ADMIN — ACETAMINOPHEN 1000 MG: 500 TABLET ORAL at 11:03

## 2024-03-07 RX ADMIN — ENOXAPARIN SODIUM 50 MG: 100 INJECTION SUBCUTANEOUS at 08:03

## 2024-03-07 RX ADMIN — METFORMIN HYDROCHLORIDE 500 MG: 500 TABLET, FILM COATED ORAL at 07:03

## 2024-03-07 NOTE — NURSING
"CBG taken at 2021 with result of 157, pt states that she did eat a snack 15 min prior to CBG taken. CBG retaken at 2106 with result of 175, pt states that she " just drank 3 milks" .  Will retake CBG 1 hour post prandial per nursing communication.   "

## 2024-03-07 NOTE — PROGRESS NOTES
[facility] Follow Up Telemedicine Office Visit Note    Initial Visit Date: 2023  Last Visit Date: 2023  Current Visit Date:  2024    Chief Complaint:     Chief Complaint   Patient presents with    Seizures     Patient denies any seizures since last visit.  delivery on  was told to D/C all meds at that time except pain medications.       History of Present Illness:      This is a real-time audio/video visit that was performed with the originating site at patient's home and the distant site, [facility]. Verbal consent to participate in interactive audio & video visit was obtained.    I discussed with the patient regarding the nature of our telehealth visits, that:    - Our sessions are not being recorded and that personal health information is protected  - Provider would evaluate the patient and recommend diagnostics and treatments based on my assessment  - [facility] will provide follow up care in person if/when the patient needs it.       This is 24 y.o. 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 Patient had previously seen Dr. Dallas Carey at [facility]. During last visit, levetiracetam AM trough was ordered. She did not do blood work. She has also stopped taking all medications as she was told to stop all medications until she follows up outpatient. She denied any paroxysmal events since 10/2023.     Age of Onset : childhood    Semiology:   nocturnal: jerking motion back to back lasting up to 1 hour   Behavioral arrest, lasting up to arms flailing, head with variable movement, eyes closed, lasting up to 20 minutes. Can occur back to back.     Frequency: variable frequency. Cluster of events in 10/2023 due to stressors in life.      Provocation Factors: stress     Risk Factors  - Family history of seizure disorders:  Yes paternal  grandmother, father, niece with seizure disorder.   - History of focal CNS lesions: Not Applicable  - History of CNS infections: No  - History head trauma with prolonged LOC: No  - History of childhood seizures, including febrile seizures: Yes as above.   - History of development delay: Yes learning disability.    - History of underlying mood disorder: Yes bipolar disorder    - History of sleep disorder: No  - Recreational drug use: No  - Alcohol use: No  - Family planning and contraceptive use: Currently post partum.      Medications:     Current Anti-Seizure Medication(s):  Levetiracetam 500 mg twice a day (10/2023 to 3/4/2024): has stopped taking all medications.     Prior Anti-Seizure Medication(s):  Unknown    Surgical Intervention & Devices:     - VNS:  - DBS  - RNS:  - Lobectomy:    Labs:     Results for orders placed or performed during the hospital encounter of 03/04/24   Strep B Screen, Vaginal / Rectal    Specimen: Vaginal/Rectal   Result Value Ref Range    Strep B Culture neg    SYPHILIS ANTIBODY (WITH REFLEX RPR)   Result Value Ref Range    Syphilis Antibody Nonreactive Nonreactive, Equivocal   CBC with Differential   Result Value Ref Range    WBC 8.20 4.50 - 11.50 x10(3)/mcL    RBC 3.97 x10(6)/mcL    Hgb 9.2 (L) 14.0 - 18.0 g/dL    Hct 31.9 (L) 42.0 - 52.0 %    MCV 80.4 80.0 - 94.0 fL    MCH 23.2 (L) 27.0 - 31.0 pg    MCHC 28.8 (L) 33.0 - 36.0 g/dL    RDW 29.4 (H) 11.5 - 17.0 %    Platelet 310 130 - 400 x10(3)/mcL    MPV 10.4 7.4 - 10.4 fL    Neut % 60.3 %    Lymph % 29.8 %    Mono % 6.8 %    Eos % 1.3 %    Basophil % 0.9 %    Lymph # 2.44 0.6 - 4.6 x10(3)/mcL    Neut # 4.95 2.1 - 9.2 x10(3)/mcL    Mono # 0.56 0.1 - 1.3 x10(3)/mcL    Eos # 0.11 0 - 0.9 x10(3)/mcL    Baso # 0.07 <=0.2 x10(3)/mcL    IG# 0.07 (H) 0 - 0.04 x10(3)/mcL    IG% 0.9 %    NRBC% 0.9 %   Blood Smear Microscopic Exam   Result Value Ref Range    Anisocytosis 2+ (A) (none)    Macrocytosis 1+ (A) (none)    Microcytosis 2+ (A) (none)     Platelets Normal Normal, Adequate   Rubella Antibody, IgG   Result Value Ref Range    Rubella Immune Status immune    Glucose, Random   Result Value Ref Range    POCT Glucose 98    CBC with Differential   Result Value Ref Range    WBC 10.88 4.50 - 11.50 x10(3)/mcL    RBC 3.31 x10(6)/mcL    Hgb 7.8 (L) 14.0 - 18.0 g/dL    Hct 26.7 (L) 42.0 - 52.0 %    MCV 80.7 80.0 - 94.0 fL    MCH 23.6 (L) 27.0 - 31.0 pg    MCHC 29.2 (L) 33.0 - 36.0 g/dL    RDW 29.0 (H) 11.5 - 17.0 %    Platelet 267 130 - 400 x10(3)/mcL    MPV 10.1 7.4 - 10.4 fL    Neut % 78.3 %    Lymph % 11.4 %    Mono % 7.7 %    Eos % 1.4 %    Basophil % 0.6 %    Lymph # 1.24 0.6 - 4.6 x10(3)/mcL    Neut # 8.53 2.1 - 9.2 x10(3)/mcL    Mono # 0.84 0.1 - 1.3 x10(3)/mcL    Eos # 0.15 0 - 0.9 x10(3)/mcL    Baso # 0.06 <=0.2 x10(3)/mcL    IG# 0.06 (H) 0 - 0.04 x10(3)/mcL    IG% 0.6 %    NRBC% 0.0 %   Type & Screen   Result Value Ref Range    Group & Rh B POS     Indirect Vee GEL NEG     Specimen Outdate 03/07/2024 23:59    POCT glucose   Result Value Ref Range    POCT Glucose 114 (H) 70 - 110 mg/dL   POCT glucose   Result Value Ref Range    POCT Glucose 98 70 - 110 mg/dL   POCT glucose   Result Value Ref Range    POCT Glucose 96 70 - 110 mg/dL   POCT glucose   Result Value Ref Range    POCT Glucose 101 70 - 110 mg/dL   POCT glucose   Result Value Ref Range    POCT Glucose 128 (H) 70 - 110 mg/dL   POCT glucose   Result Value Ref Range    POCT Glucose 110 70 - 110 mg/dL   POCT glucose   Result Value Ref Range    POCT Glucose 99 70 - 110 mg/dL   POCT glucose   Result Value Ref Range    POCT Glucose 110 70 - 110 mg/dL   POCT glucose   Result Value Ref Range    POCT Glucose 157 (H) 70 - 110 mg/dL   POCT glucose   Result Value Ref Range    POCT Glucose 175 (H) 70 - 110 mg/dL       Studies:      - routine EEG 9/19/2022 at [facility]:  This is a normal EEG without evidence of focal or  epileptiform activity as far as the artifact-obscured recording allows  interpretation    -  one hour EEG:   - 24 hour EEG:  - Ambulatory EEG:  - EMU Video EEG:  - MRI Brain:   - NCHCT:  - WADA:    Review of Systems:     Review of Systems   All other systems reviewed and are negative.      Physical Exams:     Physical Exam  Nursing note reviewed.   Constitutional:       Appearance: Normal appearance.   HENT:      Head: Atraumatic.   Pulmonary:      Effort: Pulmonary effort is normal.   Musculoskeletal:         General: Normal range of motion.      Cervical back: Normal range of motion.   Neurological:      Mental Status: Arlen Denise is alert.     Telemedicine Comprehensive Neurological Exam:  Mental Status: Alert Oriented to Self, Date, and Place. Comprehension wnl. No dysarthria.   CN II - XII: BRANDEN, VA grossly intact, No ptosis OU, EOMI without nystagmus, Hearing grossly intact, Face Symmetric, Tongue and Uvula midline, Trapezius symmetric bilateral.   Motor: no abnormal involuntary or voluntary movements, Fine finger movements wnl b/l, No pronator drift.   Sensory: unable to assess.  Reflexes: unable to assess.   Cerebellar: RAHM wnl b/l.  Romberg: absent.   Gait: normal.      Assessment:     This is 24 y.o. 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 No events reported as per patient. Also off of medications.     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     RTC 3 months with Telemedicine    Seizure education provided: including family planning and teratogenicity of AEDs, risk of uncontrolled seizure disorder, SUDEP. No driving until seizure free for six months ([place] state law). No swimming, climbing heights, or operate heavy machinery without  supervision. Patient is advised to avoid Benadryl, Tramadol, Wellbutrin, flashing lights, alcohol, sleep deprivation, and certain anti-biotics that can lower seizure threshold.     I have explained the treatment plan, diagnosis, and prognosis to patient. All questions are answered to the best of my knowledge.     Face to face time 30 minutes, including documentation, chart review, counseling, education, review of test results, relevant medical records, and coordination of care.   I have explained the treatment plan, diagnosis, and prognosis to patient. All questions are answered to the best of my knowledge.         Zhanna High MD   General Neurology  03/11/2024

## 2024-03-07 NOTE — PROGRESS NOTES
Received call from Cliff Adan [phone], assigned DCFS worker, who reports that she has made it to the hospital. Met with Cliff along with BERNARDO Estrada to provide history on parents as well as to discuss staff concerns. Cliff met with parents in postpartum room. Received updated from BERNARDO Estrada that Cliff completed assessment with family and that baby is cleared to dc into mother's care and that she will cont to follow family post dc. MDS resulted negative. No further needs identified.

## 2024-03-07 NOTE — PROGRESS NOTES
PostPartum Progress Note        Subjective:      Postpartum Day #2 after LTCS  .  Patient is without complaints. Lochia decreasing, less than menses.  Pain is well controlled. Patient is ambulating without lightheadedness. Passing flatus. Tolerating regular diet.    Objective:      Temp:  [98 °F (36.7 °C)-98.2 °F (36.8 °C)] 98.1 °F (36.7 °C)  Pulse:  [64-81] 64  Resp:  [20] 20  SpO2:  [99 %] 99 %  BP: (121-131)/(74-82) 131/80  No intake or output data in the 24 hours ending 24 1240    Body mass index is 35.61 kg/m².    General: no acute distress  Abdomen: soft, appropriately tender, LTCS incision healing well   Extremities: non-tender, symmetric    Group & Rh   Date Value Ref Range Status   2024 B POS  Final     Recent Results (from the past 336 hour(s))   CBC with Differential    Collection Time: 24  6:13 AM   Result Value Ref Range    WBC 10.88 4.50 - 11.50 x10(3)/mcL    Hgb 7.8 (L) 14.0 - 18.0 g/dL    Hct 26.7 (L) 42.0 - 52.0 %    Platelet 267 130 - 400 x10(3)/mcL   CBC with Differential    Collection Time: 24 10:05 PM   Result Value Ref Range    WBC 8.20 4.50 - 11.50 x10(3)/mcL    Hgb 9.2 (L) 14.0 - 18.0 g/dL    Hct 31.9 (L) 42.0 - 52.0 %    Platelet 310 130 - 400 x10(3)/mcL          Assessment/Plan     24 y.o.  S/P , PPD # 2 - Doing appropriately   -Continue routine postpartum care  -Anticipated d/c POD#3-4    Active Problem List with Overview Notes    Diagnosis Date Noted    Excessive fetal growth affecting management of pregnancy in third trimester 2024    Polyhydramnios in third trimester 2024    Functional neurological symptom disorder with attacks or seizures 2023    Localization-related (focal) (partial) idiopathic epilepsy and epileptic syndromes with seizures of localized onset, not intractable, without status epilepticus 2023    Hx of preeclampsia, prior pregnancy, currently pregnant, third trimester 2023    At high risk for  complications of intrauterine pregnancy (IUP) 11/01/2023    Increased BMI affecting pregnancy in third trimester 11/01/2023    Anemia during pregnancy in third trimester 11/01/2023    Seizure disorder during pregnancy in third trimester 10/19/2023    H/O shoulder dystocia in prior pregnancy, currently pregnant 10/19/2023    Pre-existing type 2 diabetes mellitus during pregnancy in third trimester 07/13/2023    Bipolar disease during pregnancy in third trimester 08/25/2021       Acute blood loss on chronic anemia  -hgb now 7.8, pre-dellivery 9.2  -As the patient's hemoglobin was less than 8.0, IV iron will be started, as the hemoglobin at 6 weeks is higher, with associated improvements in fatigue, depression, and cognition.  (PMID [id number])       Isma Payne MD  03/07/2024 7:14 AM

## 2024-03-08 VITALS
BODY MASS INDEX: 35.65 KG/M2 | SYSTOLIC BLOOD PRESSURE: 127 MMHG | TEMPERATURE: 98 F | DIASTOLIC BLOOD PRESSURE: 69 MMHG | OXYGEN SATURATION: 100 % | RESPIRATION RATE: 14 BRPM | HEIGHT: 65 IN | WEIGHT: 214 LBS | HEART RATE: 56 BPM

## 2024-03-08 LAB — POCT GLUCOSE: 95 MG/DL (ref 70–110)

## 2024-03-08 PROCEDURE — 25000003 PHARM REV CODE 250: Performed by: STUDENT IN AN ORGANIZED HEALTH CARE EDUCATION/TRAINING PROGRAM

## 2024-03-08 PROCEDURE — 63600175 PHARM REV CODE 636 W HCPCS: Performed by: STUDENT IN AN ORGANIZED HEALTH CARE EDUCATION/TRAINING PROGRAM

## 2024-03-08 RX ORDER — IBUPROFEN 600 MG/1
600 TABLET ORAL EVERY 6 HOURS
Qty: 60 TABLET | Refills: 0 | Status: SHIPPED | OUTPATIENT
Start: 2024-03-08 | End: 2024-06-10

## 2024-03-08 RX ORDER — OXYCODONE HYDROCHLORIDE 5 MG/1
5 TABLET ORAL EVERY 4 HOURS PRN
Qty: 28 TABLET | Refills: 0 | Status: SHIPPED | OUTPATIENT
Start: 2024-03-08 | End: 2024-06-10

## 2024-03-08 RX ORDER — METFORMIN HYDROCHLORIDE 500 MG/1
1000 TABLET, EXTENDED RELEASE ORAL 2 TIMES DAILY WITH MEALS
Qty: 120 TABLET | Refills: 11 | Status: SHIPPED | OUTPATIENT
Start: 2024-03-08 | End: 2024-06-10

## 2024-03-08 RX ORDER — ACETAMINOPHEN 500 MG
1000 TABLET ORAL EVERY 6 HOURS
Qty: 120 TABLET | Refills: 0 | Status: SHIPPED | OUTPATIENT
Start: 2024-03-08 | End: 2024-06-10

## 2024-03-08 RX ORDER — DOCUSATE SODIUM 100 MG/1
100 CAPSULE, LIQUID FILLED ORAL 2 TIMES DAILY
Qty: 60 CAPSULE | Refills: 0 | Status: SHIPPED | OUTPATIENT
Start: 2024-03-08 | End: 2024-06-10

## 2024-03-08 RX ADMIN — ENOXAPARIN SODIUM 50 MG: 100 INJECTION SUBCUTANEOUS at 08:03

## 2024-03-08 RX ADMIN — METFORMIN HYDROCHLORIDE 1000 MG: 500 TABLET, FILM COATED ORAL at 08:03

## 2024-03-08 RX ADMIN — DOCUSATE SODIUM 200 MG: 100 CAPSULE, LIQUID FILLED ORAL at 08:03

## 2024-03-08 RX ADMIN — IBUPROFEN 800 MG: 800 TABLET, FILM COATED ORAL at 06:03

## 2024-03-08 RX ADMIN — SIMETHICONE 80 MG: 80 TABLET, CHEWABLE ORAL at 08:03

## 2024-03-08 RX ADMIN — PRENATAL VITAMINS-IRON FUMARATE 27 MG IRON-FOLIC ACID 0.8 MG TABLET 1 TABLET: at 08:03

## 2024-03-08 NOTE — DISCHARGE SUMMARY
Delivery Discharge Summary  Obstetrics      Primary OB Clinician: Isma Payne MD    Discharge Provider: Isma Payne MD    Admission date: 3/4/2024  Discharge date: 2024    Admit Dx:  Patient Active Problem List   Diagnosis    Bipolar disease during pregnancy in third trimester    Pre-existing type 2 diabetes mellitus during pregnancy in third trimester    Seizure disorder during pregnancy in third trimester    H/O shoulder dystocia in prior pregnancy, currently pregnant    Hx of preeclampsia, prior pregnancy, currently pregnant, third trimester    At high risk for complications of intrauterine pregnancy (IUP)    Increased BMI affecting pregnancy in third trimester    Anemia during pregnancy in third trimester    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 now  who was admitted on 3/4/2024 for delivery. Patient delivered a viable . Please see delivery note for further details. Pt was in stable condition post delivery and was transferred to the Mother-Baby Unit. Her postpartum course was uncomplicated. On the date of discharge, patient's pain is controlled with oral pain medications. She is tolerating ambulation without SOB or CP, and PO diet without N/V. Reported lochia is within the normal range. Pt in stable condition and ready for discharge. Cleared by social work    Pertinent studies:  Postpartum CBC  Lab Results   Component Value Date    WBC 10.88 2024    HGB 7.8 (L) 2024    HCT 26.7 (L) 2024    MCV 80.7 2024     2024       Delivery:   "  Episiotomy:     Lacerations:     Repair suture:     Repair # of packets:     Blood loss (ml):       Birth information:  YOB: 2022   Time of birth: 5:36 AM   Sex: male   Delivery type: , Low Transverse   Gestational Age: 36w3d     Measurements    Weight: 4140 g  Weight (lbs): 9 lb 2 oz  Length: 54 cm  Length (in): 21.25"  Head circumference: 34.9 cm         Delivery Clinician: Delivery Providers    Delivering clinician: Isma Payne MD          Additional  information:  Forceps:    Vacuum:    Breech:    Observed anomalies      Living?:     Apgars    Living status: Living  Apgar Component Scores:  1 min.:  5 min.:  10 min.:  15 min.:  20 min.:    Skin color:  0  1       Heart rate:  2  2       Reflex irritability:  2  2       Muscle tone:  2  2       Respiratory effort:  2  2       Total:  8  9       Apgars assigned by: NICU         Placenta: Delivered:       appearance    Disposition: To home, self care    Follow Up: 1-2 weeks    Patient Instructions:   1. Call the office for any bleeding >2 pads/hour for >2 hours, temperature >100.4, pain that is uncontrolled with medications, or for any other concerns.  2. Pelvic rest and no tub baths x 6 weeks.            "

## 2024-03-09 LAB
POCT GLUCOSE: 158 MG/DL (ref 70–110)
POCT GLUCOSE: 98 MG/DL (ref 70–110)

## 2024-03-11 ENCOUNTER — PATIENT MESSAGE (OUTPATIENT)
Dept: ADMINISTRATIVE | Facility: OTHER | Age: 24
End: 2024-03-11
Payer: MEDICAID

## 2024-03-11 ENCOUNTER — OFFICE VISIT (OUTPATIENT)
Dept: NEUROLOGY | Facility: CLINIC | Age: 24
End: 2024-03-11
Payer: MEDICAID

## 2024-03-11 DIAGNOSIS — F44.5 FUNCTIONAL NEUROLOGICAL SYMPTOM DISORDER WITH ATTACKS OR SEIZURES: Primary | ICD-10-CM

## 2024-03-11 PROBLEM — G40.009 LOCALIZATION-RELATED (FOCAL) (PARTIAL) IDIOPATHIC EPILEPSY AND EPILEPTIC SYNDROMES WITH SEIZURES OF LOCALIZED ONSET, NOT INTRACTABLE, WITHOUT STATUS EPILEPTICUS: Status: RESOLVED | Noted: 2023-12-27 | Resolved: 2024-03-11

## 2024-03-11 PROCEDURE — 99214 OFFICE O/P EST MOD 30 MIN: CPT | Mod: 95,,, | Performed by: PSYCHIATRY & NEUROLOGY

## 2024-03-11 PROCEDURE — 1159F MED LIST DOCD IN RCRD: CPT | Mod: CPTII,95,, | Performed by: PSYCHIATRY & NEUROLOGY

## 2024-03-11 PROCEDURE — 1111F DSCHRG MED/CURRENT MED MERGE: CPT | Mod: CPTII,95,, | Performed by: PSYCHIATRY & NEUROLOGY

## 2024-05-28 ENCOUNTER — E-VISIT (OUTPATIENT)
Dept: PRIMARY CARE CLINIC | Facility: CLINIC | Age: 24
End: 2024-05-28
Payer: MEDICAID

## 2024-05-28 DIAGNOSIS — B96.89 BACTERIAL SKIN INFECTION: Primary | ICD-10-CM

## 2024-05-28 DIAGNOSIS — L08.9 BACTERIAL SKIN INFECTION: Primary | ICD-10-CM

## 2024-05-28 PROCEDURE — 99421 OL DIG E/M SVC 5-10 MIN: CPT | Mod: ,,, | Performed by: NURSE PRACTITIONER

## 2024-05-28 RX ORDER — DOXYCYCLINE 100 MG/1
100 CAPSULE ORAL 2 TIMES DAILY
Qty: 14 CAPSULE | Refills: 0 | Status: SHIPPED | OUTPATIENT
Start: 2024-05-28 | End: 2024-06-04

## 2024-05-28 RX ORDER — DOXYCYCLINE 100 MG/1
100 CAPSULE ORAL 2 TIMES DAILY
Qty: 14 CAPSULE | Refills: 0 | Status: SHIPPED | OUTPATIENT
Start: 2024-05-28 | End: 2024-05-28

## 2024-05-28 NOTE — PROGRESS NOTES
Patient ID: Norma Denise is a 24 y.o. adult.    Chief Complaint: skin infection    The patient initiated a request through [email or web address] on 5/28/2024 for evaluation and management with a chief complaint of skin infection     I evaluated the questionnaire submission on 05/28/2024 .    [facility] Peq Evisit Rash    5/28/2024 11:21 AM CDT - Filed by Patient   Do you agree to participate in an E-Visit? Yes   If you have any of the following symptoms, please present to your local emergency room or call 911:  I acknowledge   Are you pregnant, could you be pregnant, or are you breast feeding? None of the above   What is the main issue you would like addressed today? I got a pump looking thing under my breath.   How would you describe your skin problem? Lump or bump   When did your symptoms first appear? 5/2/2024   Where is it located?  Chest   Does it itch? Yes   Does it hurt? Yes   Where is the pain located? Where the skin change is noted   The pain came on: Gradually   The pain has the character of: Aching   Frequency of the pain (How often does it appear)? It is always there   Please select the face that most closely captures your pain level: 4   Is there discharge or drainage? Yes   Describe the discharge or drainage. Pus and blood colored   Is there bleeding? Yes   Describe the character Draining   Describe the color Purple   Has it changed over time? Shrunk in size   Frequency of skin problem Weekly   Duration of the skin problem (how long does it stay when it is present) Weeks   I have had a new exposure to No new exposures   I have had a new exposure to No new exposures   What have you used to treat the skin problem? Ice pack and Ointment.   If you have used anything for treatment, has it helped the symptoms? No   Other generalized symptoms that you associate with the rash Headache   Provide any additional information you feel is important.    At least one photo is required for treatment to be provided. You can upload a  maximum of three photos of the affected area.     Are you able to take your vital signs? No         Encounter Diagnosis   Name Primary?    Bacterial skin infection Yes        No orders of the defined types were placed in this encounter.     Medications Ordered This Encounter   Medications    doxycycline (MONODOX) 100 MG capsule     Sig: Take 1 capsule (100 mg total) by mouth 2 (two) times daily. for 7 days     Dispense:  14 capsule     Refill:  0        Follow up if symptoms worsen or fail to improve.      E-Visit Time Tracking:    Day 1 Time (in minutes): 6    Total Time (in minutes): 6

## 2024-06-03 PROBLEM — O40.3XX0 POLYHYDRAMNIOS IN THIRD TRIMESTER: Status: RESOLVED | Noted: 2024-02-08 | Resolved: 2024-06-03

## 2024-06-06 NOTE — PROGRESS NOTES
[facility] Follow Up Telemedicine Visit Note    Initial Visit Date: 12/27/2023  Last Visit Date: 3/11/2024  Current Visit Date:  06/10/2024    Chief Complaint:     Chief Complaint   Patient presents with    Seizures     States had one a few nights ago--shaking in her sleep       History of Present Illness:      This is a real-time audio/video visit that was performed with the originating site at patient's home and the distant site, [facility]. Verbal consent to participate in interactive audio & video visit was obtained.    I discussed with the patient regarding the nature of our telehealth visits, that:    - Our sessions are not being recorded and that personal health information is protected  - Provider would evaluate the patient and recommend diagnostics and treatments based on my assessment  - [facility] will provide follow up care in person if/when the patient needs it.       This is 24 y.o. 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 No events reported as per patient. Also off of medications. During last visit, we did not restart anti-seizure medication given high index of suspicion of functional neurological disorder.  Since she delivered in March, she had one nocturnal event. She is now amenable to restarting medications.     Age of Onset : childhood    Semiology:   nocturnal: jerking motion back to back lasting up to 1 hour   Behavioral arrest, lasting up to arms flailing, head with variable movement, eyes closed, lasting up to 20 minutes. Can occur back to back.     Frequency: variable frequency. Cluster of events in 10/2023 due to stressors in life.      Provocation Factors: stress     Risk Factors  - Family history of seizure disorders:  Yes paternal grandmother, father, niece with seizure disorder.   - History  of focal CNS lesions: Not Applicable  - History of CNS infections: No  - History head trauma with prolonged LOC: No  - History of childhood seizures, including febrile seizures: Yes as above.   - History of development delay: Yes learning disability.    - History of underlying mood disorder: Yes bipolar disorder    - History of sleep disorder: No  - Recreational drug use: No  - Alcohol use: No  - Family planning and contraceptive use: no immediate plans on becoming pregnant. Not on birth control.     Medications:     Current Anti-Seizure Medication(s):  None    Prior Anti-Seizure Medication(s):  Levetiracetam 500 mg twice a day (10/2023 to 3/4/2024): has stopped taking all medications.     Surgical Intervention & Devices:     - VNS:  - DBS  - RNS:  - Lobectomy:    Labs:     Results for orders placed or performed during the hospital encounter of 03/04/24   Strep B Screen, Vaginal / Rectal    Specimen: Vaginal/Rectal   Result Value Ref Range    Strep B Culture neg    SYPHILIS ANTIBODY (WITH REFLEX RPR)   Result Value Ref Range    Syphilis Antibody Nonreactive Nonreactive, Equivocal   CBC with Differential   Result Value Ref Range    WBC 8.20 4.50 - 11.50 x10(3)/mcL    RBC 3.97 x10(6)/mcL    Hgb 9.2 (L) 14.0 - 18.0 g/dL    Hct 31.9 (L) 42.0 - 52.0 %    MCV 80.4 80.0 - 94.0 fL    MCH 23.2 (L) 27.0 - 31.0 pg    MCHC 28.8 (L) 33.0 - 36.0 g/dL    RDW 29.4 (H) 11.5 - 17.0 %    Platelet 310 130 - 400 x10(3)/mcL    MPV 10.4 7.4 - 10.4 fL    Neut % 60.3 %    Lymph % 29.8 %    Mono % 6.8 %    Eos % 1.3 %    Basophil % 0.9 %    Lymph # 2.44 0.6 - 4.6 x10(3)/mcL    Neut # 4.95 2.1 - 9.2 x10(3)/mcL    Mono # 0.56 0.1 - 1.3 x10(3)/mcL    Eos # 0.11 0 - 0.9 x10(3)/mcL    Baso # 0.07 <=0.2 x10(3)/mcL    IG# 0.07 (H) 0 - 0.04 x10(3)/mcL    IG% 0.9 %    NRBC% 0.9 %   Blood Smear Microscopic Exam   Result Value Ref Range    Anisocytosis 2+ (A) (none)    Macrocytosis 1+ (A) (none)    Microcytosis 2+ (A) (none)    Platelets Normal Normal,  Adequate   Rubella Antibody, IgG   Result Value Ref Range    Rubella Immune Status immune    Glucose, Random   Result Value Ref Range    POCT Glucose 98    CBC with Differential   Result Value Ref Range    WBC 10.88 4.50 - 11.50 x10(3)/mcL    RBC 3.31 x10(6)/mcL    Hgb 7.8 (L) 14.0 - 18.0 g/dL    Hct 26.7 (L) 42.0 - 52.0 %    MCV 80.7 80.0 - 94.0 fL    MCH 23.6 (L) 27.0 - 31.0 pg    MCHC 29.2 (L) 33.0 - 36.0 g/dL    RDW 29.0 (H) 11.5 - 17.0 %    Platelet 267 130 - 400 x10(3)/mcL    MPV 10.1 7.4 - 10.4 fL    Neut % 78.3 %    Lymph % 11.4 %    Mono % 7.7 %    Eos % 1.4 %    Basophil % 0.6 %    Lymph # 1.24 0.6 - 4.6 x10(3)/mcL    Neut # 8.53 2.1 - 9.2 x10(3)/mcL    Mono # 0.84 0.1 - 1.3 x10(3)/mcL    Eos # 0.15 0 - 0.9 x10(3)/mcL    Baso # 0.06 <=0.2 x10(3)/mcL    IG# 0.06 (H) 0 - 0.04 x10(3)/mcL    IG% 0.6 %    NRBC% 0.0 %   Type & Screen   Result Value Ref Range    Group & Rh B POS     Indirect Eve GEL NEG     Specimen Outdate 03/07/2024 23:59    POCT glucose   Result Value Ref Range    POCT Glucose 114 (H) 70 - 110 mg/dL   POCT glucose   Result Value Ref Range    POCT Glucose 98 70 - 110 mg/dL   POCT glucose   Result Value Ref Range    POCT Glucose 96 70 - 110 mg/dL   POCT glucose   Result Value Ref Range    POCT Glucose 101 70 - 110 mg/dL   POCT glucose   Result Value Ref Range    POCT Glucose 128 (H) 70 - 110 mg/dL   POCT glucose   Result Value Ref Range    POCT Glucose 110 70 - 110 mg/dL   POCT glucose   Result Value Ref Range    POCT Glucose 99 70 - 110 mg/dL   POCT glucose   Result Value Ref Range    POCT Glucose 110 70 - 110 mg/dL   POCT glucose   Result Value Ref Range    POCT Glucose 157 (H) 70 - 110 mg/dL   POCT glucose   Result Value Ref Range    POCT Glucose 175 (H) 70 - 110 mg/dL   POCT glucose   Result Value Ref Range    POCT Glucose 113 (H) 70 - 110 mg/dL   POCT glucose   Result Value Ref Range    POCT Glucose 109 70 - 110 mg/dL   POCT glucose   Result Value Ref Range    POCT Glucose 113 (H) 70 -  110 mg/dL   POCT glucose   Result Value Ref Range    POCT Glucose 95 70 - 110 mg/dL   POCT glucose   Result Value Ref Range    POCT Glucose 158 (H) 70 - 110 mg/dL   POCT glucose   Result Value Ref Range    POCT Glucose 98 70 - 110 mg/dL       Studies:      - routine EEG 9/19/2022 at [facility]:  This is a normal EEG without evidence of focal or  epileptiform activity as far as the artifact-obscured recording allows  interpretation    - one hour EEG:   - 24 hour EEG:  - Ambulatory EEG:  - EMU Video EEG:  - MRI Brain:   - NCHCT:  - WADA:    Review of Systems:     Review of Systems   All other systems reviewed and are negative.      Physical Exams:     Physical Exam  Nursing note reviewed.   Constitutional:       Appearance: Normal appearance.   HENT:      Head: Atraumatic.   Pulmonary:      Effort: Pulmonary effort is normal.   Musculoskeletal:         General: Normal range of motion.      Cervical back: Normal range of motion.   Neurological:      Mental Status: Arlen is alert.     Telemedicine Comprehensive Neurological Exam:  Mental Status: Alert Oriented to Self, Date, and Place. Comprehension wnl. No dysarthria.   CN II - XII: BRANDEN, VA grossly intact, No ptosis OU, EOMI without nystagmus, Hearing grossly intact, Face Symmetric, Tongue and Uvula midline, Trapezius symmetric bilateral.   Motor: no abnormal involuntary or voluntary movements, Fine finger movements wnl b/l, No pronator drift.   Sensory: unable to assess.  Reflexes: unable to assess.   Cerebellar: RAHM wnl b/l.  Romberg: absent.   Gait: normal.      Assessment:     This is 24 y.o. 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 Still having nocturnal events. Patient is now amenable to restarting medications.     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     Seizure education provided: including family planning and teratogenicity of AEDs, risk of uncontrolled seizure disorder, SUDEP. No driving until seizure free for six months ([place] state law). No swimming, climbing heights, or operate heavy machinery without supervision. Patient is advised to avoid Benadryl, Tramadol, Wellbutrin, flashing lights, alcohol, sleep deprivation, and certain anti-biotics that can lower seizure threshold.     I have explained the treatment plan, diagnosis, and prognosis to patient. All questions are answered to the best of my knowledge.     Face to face time 30 minutes, including documentation, chart review, counseling, education, review of test results, relevant medical records, and coordination of care.   I have explained the treatment plan, diagnosis, and prognosis to patient. All questions are answered to the best of my knowledge.         Zhanna High MD   General Neurology  06/10/2024

## 2024-06-10 ENCOUNTER — OFFICE VISIT (OUTPATIENT)
Dept: NEUROLOGY | Facility: CLINIC | Age: 24
End: 2024-06-10
Payer: MEDICAID

## 2024-06-10 DIAGNOSIS — G40.009 LOCALIZATION-RELATED (FOCAL) (PARTIAL) IDIOPATHIC EPILEPSY AND EPILEPTIC SYNDROMES WITH SEIZURES OF LOCALIZED ONSET, NOT INTRACTABLE, WITHOUT STATUS EPILEPTICUS: Primary | ICD-10-CM

## 2024-06-10 DIAGNOSIS — F44.5 FUNCTIONAL NEUROLOGICAL SYMPTOM DISORDER WITH ATTACKS OR SEIZURES: ICD-10-CM

## 2024-06-10 PROCEDURE — 99214 OFFICE O/P EST MOD 30 MIN: CPT | Mod: 95,,, | Performed by: PSYCHIATRY & NEUROLOGY

## 2024-06-10 PROCEDURE — 1159F MED LIST DOCD IN RCRD: CPT | Mod: CPTII,95,, | Performed by: PSYCHIATRY & NEUROLOGY

## 2024-06-10 PROCEDURE — G2211 COMPLEX E/M VISIT ADD ON: HCPCS | Mod: 95,,, | Performed by: PSYCHIATRY & NEUROLOGY

## 2024-06-10 RX ORDER — LEVETIRACETAM 500 MG/1
500 TABLET ORAL 2 TIMES DAILY
Qty: 60 TABLET | Refills: 3 | Status: SHIPPED | OUTPATIENT
Start: 2024-06-10 | End: 2025-06-10

## 2024-06-10 RX ORDER — FOLIC ACID 1 MG/1
1 TABLET ORAL DAILY
Qty: 30 TABLET | Refills: 11 | Status: SHIPPED | OUTPATIENT
Start: 2024-06-10 | End: 2025-06-10

## 2024-07-29 ENCOUNTER — LAB VISIT (OUTPATIENT)
Dept: LAB | Facility: HOSPITAL | Age: 24
End: 2024-07-29
Attending: STUDENT IN AN ORGANIZED HEALTH CARE EDUCATION/TRAINING PROGRAM
Payer: MEDICAID

## 2024-07-29 DIAGNOSIS — Z34.81 PRENATAL CARE, SUBSEQUENT PREGNANCY, FIRST TRIMESTER: Primary | ICD-10-CM

## 2024-07-29 LAB
B-HCG FREE SERPL-ACNC: 505.75 MIU/ML
C TRACH DNA SPEC QL NAA+PROBE: NOT DETECTED
GROUP & RH: NORMAL
INDIRECT COOMBS: NORMAL
N GONORRHOEA DNA SPEC QL NAA+PROBE: NOT DETECTED
SOURCE (OHS): NORMAL
SPECIMEN OUTDATE: NORMAL

## 2024-07-29 PROCEDURE — 87491 CHLMYD TRACH DNA AMP PROBE: CPT

## 2024-07-29 PROCEDURE — 87591 N.GONORRHOEAE DNA AMP PROB: CPT

## 2024-07-29 PROCEDURE — 87340 HEPATITIS B SURFACE AG IA: CPT

## 2024-07-29 PROCEDURE — 86762 RUBELLA ANTIBODY: CPT

## 2024-07-29 PROCEDURE — 84702 CHORIONIC GONADOTROPIN TEST: CPT

## 2024-07-29 PROCEDURE — 86850 RBC ANTIBODY SCREEN: CPT | Performed by: STUDENT IN AN ORGANIZED HEALTH CARE EDUCATION/TRAINING PROGRAM

## 2024-07-29 PROCEDURE — 86901 BLOOD TYPING SEROLOGIC RH(D): CPT | Performed by: STUDENT IN AN ORGANIZED HEALTH CARE EDUCATION/TRAINING PROGRAM

## 2024-07-29 PROCEDURE — 86803 HEPATITIS C AB TEST: CPT

## 2024-07-29 PROCEDURE — 87389 HIV-1 AG W/HIV-1&-2 AB AG IA: CPT

## 2024-07-29 PROCEDURE — 36415 COLL VENOUS BLD VENIPUNCTURE: CPT

## 2024-07-29 PROCEDURE — 87086 URINE CULTURE/COLONY COUNT: CPT

## 2024-07-29 PROCEDURE — 86900 BLOOD TYPING SEROLOGIC ABO: CPT | Performed by: STUDENT IN AN ORGANIZED HEALTH CARE EDUCATION/TRAINING PROGRAM

## 2024-07-30 LAB
HBV SURFACE AG SERPL QL IA: NONREACTIVE
HCV AB SERPL QL IA: NONREACTIVE
HIV 1+2 AB+HIV1 P24 AG SERPL QL IA: NONREACTIVE

## 2024-07-31 ENCOUNTER — LAB VISIT (OUTPATIENT)
Dept: LAB | Facility: HOSPITAL | Age: 24
End: 2024-07-31
Attending: STUDENT IN AN ORGANIZED HEALTH CARE EDUCATION/TRAINING PROGRAM
Payer: MEDICAID

## 2024-07-31 DIAGNOSIS — Z34.81 PRENATAL CARE, SUBSEQUENT PREGNANCY IN FIRST TRIMESTER: ICD-10-CM

## 2024-07-31 DIAGNOSIS — Z34.81 PRENATAL CARE, SUBSEQUENT PREGNANCY, FIRST TRIMESTER: Primary | ICD-10-CM

## 2024-07-31 LAB
B-HCG FREE SERPL-ACNC: 1158.66 MIU/ML
BACTERIA UR CULT: NORMAL
RUBV IGG SERPL IA-ACNC: 2
RUBV IGG SERPL QL IA: POSITIVE
T PALLIDUM AB SER QL: NONREACTIVE

## 2024-07-31 PROCEDURE — 86780 TREPONEMA PALLIDUM: CPT

## 2024-07-31 PROCEDURE — 84702 CHORIONIC GONADOTROPIN TEST: CPT

## 2024-07-31 PROCEDURE — 36415 COLL VENOUS BLD VENIPUNCTURE: CPT

## 2024-08-26 ENCOUNTER — LAB VISIT (OUTPATIENT)
Dept: LAB | Facility: HOSPITAL | Age: 24
End: 2024-08-26
Attending: STUDENT IN AN ORGANIZED HEALTH CARE EDUCATION/TRAINING PROGRAM
Payer: MEDICAID

## 2024-08-26 DIAGNOSIS — Z34.81 PRENATAL CARE, SUBSEQUENT PREGNANCY, FIRST TRIMESTER: Primary | ICD-10-CM

## 2024-08-26 PROBLEM — O99.013 ANEMIA DURING PREGNANCY IN THIRD TRIMESTER: Status: RESOLVED | Noted: 2023-11-01 | Resolved: 2024-08-26

## 2024-08-26 PROBLEM — Z78.9 POOR HISTORIAN: Status: ACTIVE | Noted: 2024-08-26

## 2024-08-26 LAB
ERYTHROCYTE [DISTWIDTH] IN BLOOD BY AUTOMATED COUNT: 15.2 % (ref 11.5–17)
EST. AVERAGE GLUCOSE BLD GHB EST-MCNC: 125.5 MG/DL
HBA1C MFR BLD: 6 %
HCT VFR BLD AUTO: 32.9 % (ref 42–52)
HGB BLD-MCNC: 10.2 G/DL (ref 14–18)
MCH RBC QN AUTO: 23.8 PG (ref 27–31)
MCHC RBC AUTO-ENTMCNC: 31 G/DL (ref 33–36)
MCV RBC AUTO: 76.7 FL (ref 80–94)
NRBC BLD AUTO-RTO: 0 %
PLATELET # BLD AUTO: 332 X10(3)/MCL (ref 130–400)
PMV BLD AUTO: 9 FL (ref 7.4–10.4)
RBC # BLD AUTO: 4.29 X10(6)/MCL
WBC # BLD AUTO: 8.29 X10(3)/MCL (ref 4.5–11.5)

## 2024-08-26 PROCEDURE — 83036 HEMOGLOBIN GLYCOSYLATED A1C: CPT

## 2024-08-26 PROCEDURE — 36415 COLL VENOUS BLD VENIPUNCTURE: CPT

## 2024-08-26 PROCEDURE — 85027 COMPLETE CBC AUTOMATED: CPT

## 2024-08-27 ENCOUNTER — TELEPHONE (OUTPATIENT)
Dept: MATERNAL FETAL MEDICINE | Facility: CLINIC | Age: 24
End: 2024-08-27
Payer: MEDICAID

## 2024-08-27 DIAGNOSIS — G40.909 SEIZURE DISORDER DURING PREGNANCY IN THIRD TRIMESTER: ICD-10-CM

## 2024-08-27 DIAGNOSIS — O24.113 PRE-EXISTING TYPE 2 DIABETES MELLITUS DURING PREGNANCY IN THIRD TRIMESTER: Primary | ICD-10-CM

## 2024-08-27 DIAGNOSIS — O99.353 SEIZURE DISORDER DURING PREGNANCY IN THIRD TRIMESTER: ICD-10-CM

## 2024-08-28 ENCOUNTER — TELEPHONE (OUTPATIENT)
Dept: MATERNAL FETAL MEDICINE | Facility: CLINIC | Age: 24
End: 2024-08-28
Payer: MEDICAID

## 2024-08-28 PROBLEM — O09.219 PREVIOUS PRETERM DELIVERY, ANTEPARTUM: Status: ACTIVE | Noted: 2024-08-28

## 2024-08-28 PROBLEM — O36.63X0 EXCESSIVE FETAL GROWTH AFFECTING MANAGEMENT OF PREGNANCY IN THIRD TRIMESTER: Status: RESOLVED | Noted: 2024-02-22 | Resolved: 2024-08-28

## 2024-08-28 PROBLEM — O99.211 OBESITY AFFECTING PREGNANCY IN FIRST TRIMESTER: Status: ACTIVE | Noted: 2023-11-01

## 2024-08-28 PROBLEM — O09.291 HX OF PREECLAMPSIA, PRIOR PREGNANCY, CURRENTLY PREGNANT, FIRST TRIMESTER: Status: ACTIVE | Noted: 2023-11-01

## 2024-08-28 PROBLEM — O99.351 SEIZURE DISORDER DURING PREGNANCY IN FIRST TRIMESTER: Status: ACTIVE | Noted: 2023-10-19

## 2024-08-28 PROBLEM — O99.341 BIPOLAR DISEASE DURING PREGNANCY IN FIRST TRIMESTER: Status: ACTIVE | Noted: 2021-08-25

## 2024-08-28 NOTE — PROGRESS NOTES
Maternal Fetal Medicine New Consult    Subjective:     Patient ID: [id number]    Chief Complaint: MFM Consult w/us  (For seizure d/o, and type 2 dm )      HPI: 24 y.o.  adult  at 8w6d gestation with Estimated Date of Delivery: 25 by early US and uncertain LMP. She is sent for MFM consultation for type 2 diabetes.     She has type 2 diabetes, diagnosed as a child.  She is currently on no medication for that.  She had hemoglobin A1c 6.0% on .  She brought a blood sugar log with 3 values, 2 of which were elevated.  She has history of shoulder dystocia in a previous pregnancy.  That child was 8 lb 4 oz at birth.  She also had preeclampsia in her previous pregnancy.  She has history of epilepsy, diagnosed as a child.  She and was on Keppra, but reports that she stopped that with positive urine pregnancy test.  She is however taking folic acid 4 mg daily.  She follows with Dr. High.  She reports/seizure about a week ago brought on by stress.  She has increased BMI of 34.45 on initial MFM consult visit.  She has bipolar disorder.  Patient reports that she thinks she was on Latuda, but that made it worse so her Mental Health provider advised her to stop that medication.  Upon chart review, appears patient had also been taking Abilify, but reports that she is not on any medication for the bipolar at this time.  She reports history of postpartum psychosis after her last delivery.  She follows with [facility].  She had  rupture of membrane at 36 weeks 3 days in her last pregnancy.  She was delivered via  section due to nonreassuring fetal tracing in 2024.       She denies any personal or family history of aneuploidy or anomalies. She denies any exposure to high fevers, viral rashes, illicit drugs or alcohol in this pregnancy.  She denies any leaking fluid, vaginal bleeding, contractions, decreased fetal movement. Denies headaches, visual disturbances, or epigastric pain.        Pregnancy complications include:  Patient Active Problem List   Diagnosis    Bipolar disease during pregnancy in first trimester    Pre-existing type 2 diabetes mellitus during pregnancy in first trimester    Seizure disorder during pregnancy in first trimester    H/O shoulder dystocia in prior pregnancy, currently pregnant    Hx of preeclampsia, prior pregnancy, currently pregnant, first trimester    At high risk for complications of intrauterine pregnancy (IUP)    Obesity affecting pregnancy in first trimester    Functional neurological symptom disorder with attacks or seizures    Localization-related (focal) (partial) idiopathic epilepsy and epileptic syndromes with seizures of localized onset, not intractable, without status epilepticus    Poor historian    Previous  delivery, antepartum    Short interval between pregnancies affecting pregnancy in first trimester, antepartum       Past Medical History:   Diagnosis Date    Adjustment disorder     Anemia     BEGAN WITH PREGNANCY OF LAST BABY, CURRENT    Anxiety     Asthma     CURRENT    Bipolar disorder     Depression 2008    Diabetes mellitus 2023    TYPE 2 DIABETIC    Excessive fetal growth affecting management of pregnancy in third trimester 2024    History of psychiatric hospitalization 2008    Hx of psychiatric care     Hypertension         Postpartum depression     Psychiatric exam requested by authority     Psychiatric problem     Seizure disorder     DIAGNOSED WHEN BORN    Sleep difficulties     Substance abuse         Suicide attempt            Past Surgical History:   Procedure Laterality Date     SECTION N/A 2024    Procedure:  SECTION;  Surgeon: Isma Payne MD;  [facility]  Service: OB/GYN;  Laterality: N/A;       Family History   Problem Relation Name Age of Onset    No Known Problems Paternal Grandfather      No Known Problems Paternal Grandmother       No Known Problems Maternal Grandmother      No Known Problems Maternal Grandfather      Stroke Father      Diabetes Mother      Bipolar disorder Mother      Schizophrenia Mother      No Known Problems Brother      Bipolar disorder Sister      Schizophrenia Sister      No Known Problems Maternal Aunt      No Known Problems Maternal Uncle      No Known Problems Paternal Aunt      No Known Problems Paternal Uncle      No Known Problems Cousin         Social History     Socioeconomic History    Marital status:     Number of children: 0   Occupational History    Occupation: unemployed   Tobacco Use    Smoking status: Former     Types: Vaping with nicotine, Cigarettes     Start date: 2020     Quit date:      Years since quittin.6     Passive exposure: Never    Smokeless tobacco: Never   Substance and Sexual Activity    Alcohol use: Never    Drug use: Not Currently     Frequency: 21.0 times per week     Types: Marijuana, Heroin    Sexual activity: Yes     Partners: Male     Birth control/protection: None   Other Topics Concern    Patient feels they ought to cut down on drinking/drug use No    Patient annoyed by others criticizing their drinking/drug use No    Patient has felt bad or guilty about drinking/drug use No    Patient has had a drink/used drugs as an eye opener in the AM No   Social History Narrative    ** Merged History Encounter **         Pt is a 21 year old female who recently broke up with her  Boyfriend, whom she had been living with.   Pt reports that she does not attend school nor does she have a job. Pt states that she completed 11 th grade.     Social Determinants of Health     Financial Resource Strain: Medium Risk (2022)    [facility], [facility]    Overall Financial Resource Strain (CARDIA)     Difficulty of Paying  Living Expenses: Somewhat hard   Physical Activity: Sufficiently Active (8/1/2022)    [facility]    Exercise Vital Sign     Days of Exercise per Week: 7 days     Minutes of Exercise per Session: 30 min   Stress: Stress Concern Present (8/1/2022)    [facility] and Aff[facility]ates    [employer] [place] [employer] - Occupational Stress Questionnaire     Feeling of Stress : Very much   Housing Stability: Low Risk  (8/1/2022)    [facility], [facility] Its Subsidiaries and Affiliates    Housing Stability Vital Sign     Unable to Pay for Housing in the Last Year: No     Number of Places Lived in the Last Year: 1     Unstable Housing in the Last Year: No       Current Outpatient Medications   Medication Sig Dispense Refill    albuterol (PROVENTIL/VENTOLIN HFA) 90 mcg/actuation inhaler Inhale 1-2 puffs into the lungs every 6 (six) hours as needed for Wheezing. Rescue 6.7 g 3    aspirin (ECOTRIN) 81 MG EC tablet Take 2 tablets (162 mg total) by mouth once daily. Start at 12 weeks gestation.  At 36 weeks gestation, decrease to one 81mg tablet daily. 60 tablet 6    blood sugar diagnostic Strp To check BG 4 times daily, to use with insurance preferred meter. Check blood glucose 4 times/day: every morning before you eat (fasting) as well as 1 hour after each meal (breakfast, lunch, dinner). Record results for MD to review. 100 strip 2    blood-glucose meter kit To check BG 4 times daily, to use with insurance preferred meter.Check blood glucose 4 times/day: every  "morning before you eat (fasting) as well as 1 hour after each meal (breakfast, lunch, dinner). Record results for MD to review. 1 each 0    folic acid (FOLVITE) 1 MG tablet Take 4 tablets (4 mg total) by mouth once daily. 120 tablet 3    lancets Misc To check BG 4 times daily, to use with insurance preferred meter. Check blood glucose 4 times/day: every morning before you eat (fasting) as well as 1 hour after each meal (breakfast, lunch, dinner). Record results for MD to review. 100 each 2    magnesium oxide (MAG-OX) 400 mg (241.3 mg magnesium) tablet Take 1 tablet (400 mg total) by mouth once daily. For the prevention of headaches 30 tablet 3    metoclopramide HCl (REGLAN) 10 MG tablet Take 1 tablet (10 mg total) by mouth 3 (three) times daily with meals. 90 tablet 1    prenatal vit no.130-iron-folic (PRENATAL VITAMIN) 27 mg iron- 800 mcg Tab Take 1 tablet by mouth Daily. 90 tablet 3     No current facility-administered medications for this visit.       Review of patient's allergies indicates:  No Known Allergies     Review of Systems   12 point review of systems conducted, negative except as stated in the history of present illness. See HPI for details.      Objective:     Visit Vitals  /65 (BP Location: Left arm, Patient Position: Sitting, BP Method: Medium (Automatic))   Pulse 89   Ht 5' 5" (1.651 m)   Wt 93.9 kg (207 lb)   LMP 06/24/2024 (Approximate)   BMI 34.45 kg/m²        Physical Exam  Vitals and nursing note reviewed.   Constitutional:       General: Arlen is not in acute distress.     Appearance: Normal appearance.      Comments: Increased BMI   HENT:      Head: Normocephalic and atraumatic.      Nose: Nose normal. No congestion.      Mouth/Throat:      Pharynx: Oropharynx is clear.   Eyes:      General: No scleral icterus.     Conjunctiva/sclera: Conjunctivae normal.   Cardiovascular:      Rate and Rhythm: Normal rate and regular rhythm.   Pulmonary:      Effort: No respiratory distress.      Breath " sounds: Normal breath sounds. No wheezing.   Abdominal:      General: Abdomen is flat.      Palpations: Abdomen is soft.      Tenderness: There is no abdominal tenderness. There is no right CVA tenderness, left CVA tenderness or guarding.      Comments: No CVA tenderness gravid uterus.    Musculoskeletal:         General: Normal range of motion.      Cervical back: Neck supple.      Right lower leg: No edema.      Left lower leg: No edema.   Skin:     General: Skin is warm.      Findings: No bruising or rash.   Neurological:      General: No focal deficit present.      Mental Status: Arlen is oriented to person, place, and time.      Deep Tendon Reflexes: Reflexes normal.      Comments: Normal reflexes   Psychiatric:         Mood and Affect: Mood normal.         Behavior: Behavior normal.         Thought Content: Thought content normal.         Judgment: Judgment normal.         Assessment/Plan:     24 y.o.  female with IUP at 8w6d    Seizure disorder   Viable IUP with size consistent with established dating on 24    Over 90 percent of women with epilepsy have a normal pregnancy; however, increases in the risk of low birth weight, lower Apgar scores, preeclampsia, placental abruption, and prematurity have all been reported. There is an increased risk of both major and minor malformations in fetuses exposed to anti-epileptic drugs (4-6% vs population risk of 2-3%). The most common major congenital malformations associated with these medications are neural tube, congenital heart and urinary tract defects, skeletal abnormalities, and cleft palate. The risk of fetal malformations is strongly influenced by the specific medication used. The risk is higher with the potential use of seizure medications.  Polytherapy with more than one antiepileptic drug increases the risk for malformations to 6-9%. In addition, the gestational timing of the exposure is critical as most organogenesis is complete by 10 weeks  gestation.     The major risks of seizures include the risk of maternal or fetal injury during an epileptic episode (leading to abruption or miscarriage), fetal hypoxia secondary to decreased placental blood flow or post-ictal apnea., as well as injury to the fetus, abruption, or miscarriage due to maternal trauma sustained during a seizure.     Ideally, seizures should be adequately controlled before conception either on or off AEDs. Patients who are seizure-free for two or more years should be considered for AED withdrawal six months or more prior to planned conception. Withdrawal should not be attempted during pregnancy. 90% of seizure relapses will occur in the first year after withdrawal & 80% within the first 4 months. Pregnancy itself does not increase the frequency of seizures. The patients at highest risk of recurrent seizures off medications are those with underlying brain lesions, those with abnormal EEG, adolescent seizures and frequent seizures requiring more than one medication. Many women experience increased seizure frequency during pregnancy and postpartum due to sleep-deprivation or noncompliance with medications due to concerns about the effects of the medication on the developing fetus.      Since there is no agreement as to which AED is most or least teratogenic, the AED that stops seizures is the one that should be used. An exception is valproate, which should be avoided if possible during pregnancy due to higher risks of teratogenicity and poor neurodevelopmental outcomes. Additionally, if there is a family history of NTD, both valproate and carbamazepine should be avoided. Pregnancy registries continue to accumulate data on the various AEDs (The Antiepileptic Drug Pregnancy Registry (Toll-free:1-154.751.4638; www.massgeneral.org/aed).    The AED should be administered at the lowest dose that protects against seizures with monitoring of drug levels. The use of multiple agents should be  avoided, if possible. There are limited advantages to changing AEDs if pregnancy has already been established for several weeks and changing AEDs may precipitate seizures.    Levetiracetam (Keppra) is among the newer anti-epileptic drugs, and the risks are not yet certain. Published literature, including information from pregnancy registries, which reflects prolonged experience over 2 decades, have not shown an association between the agent and major birth defects or miscarriage. However, available studies cannot definitely establish the absence of risk. Levetiracetam concentrations may be affected due to physiological changes that occur during pregnancy and decreased concentrations have been reported. These were more pronounced during the third trimester. Discontinuation of levetiracetam during pregnancy may increase seizure frequency and result in maternal or fetal harm.    I discussed that the risk of seizure outweigh any risk of medication and recommend her to restart medication regimen (Keppra 500 mg twice daily), and advised to continue folic acid 4 mg daily until 12 weeks then take folic acid 1 mg daily until end of pregnancy. In the future, recommend folic acid supplementation with 4mg PO daily for 1-3 months prior to conception and throughout first trimester.      If she has any seizures, she needs to report that immediately.  It is also recommended to avoid driving or operating heavy/hazardous equipment until 6 months post last seizure.    It is recommended to optimize antiepileptic medications by monitoring levels under the guidance of neurology.  She was advised to maintain follow-up with neurology and continue medication as directed.    I recommend a targeted fetal anatomy ultrasound around 18-20 weeks gestation and serial growth ultrasounds every 4-6 weeks starting starting at 26-28 weeks. If evidence of fetal growth restriction or other complications occur, then closer fetal surveillance will be  needed. She is to do fetal kick counts 3x/daily and PRN (after 24 weeks) with immediate reporting of decreased fetal movements (<10 movements/hr).     We will have fetal echo as below.    Breastfeeding can be at the patient's discretion.  Most studies have found that the benefits of breast feeding outweigh the risks of medication exposure. The patient was counseled to avoid triggers (sleep deprivation) and to ensure additional supervision with feeding, changing, and bathing baby after birth. Consideration of potential interaction between her AED and desired birth control method should be reviewed in the third trimester and postpartum (by the patient's primary OB provider).       Type 2 diabetes   The patient was counseled regarding the risks of diabetes in pregnancy. These risks include but are not limited to an increased risk of , preeclampsia/gestational hypertension, fetal structural anomalies, macrosomia, prematurity, shoulder dystocia/birth injury,  hyperbilirubinemia and electrolyte issues, pulmonary immaturity and sudden stillbirth especially in those patients with poor glucose control. I also discussed with the patient the need for increased fetal surveillance with serial fetal growth assessments and third trimester fetal testing. I also reviewed the possibility of need for early delivery in those with poor glucose control or evidence of fetal growth abnormalities. She was advised of the association of diabetes mellitus with anomalies, correlated with hemoglobin A1C level. The higher the hemoglobin A1C, the higher the risk of anomalies.     Hemoglobin A1C was discussed. Baseline 24-hour urine for protein was ordered. The need for ophthalmology consultation to rule out diabetic retinopathy was discussed and recommended ophthalmologic evaluation for that. Patient will schedule appointment with Eye doctor. With risk of congenital heart defect, I discussed the option of fetal echocardiogram to  assess cardiac anatomy, and will schedule fetal echo around 20 weeks and will schedule level 2 obstetrical ultrasound around 20 weeks. .    I have shared with her the options of treatment including insulin, which is the gold standard of care for diabetes in pregnancy, versus glyburide or metformin as alternatives. Neither glyburide nor metformin are indicated for treatment of Type 1 DM. Although glyburide has been used for around 20 years as primary alternative to insulin, a recent meta-analyis (2015) suggested that metformin should be the alternative to insulin and not glyburide. The conclusion of the study showed a higher birth weight (100 g) associated with glyburide use compared to insulin, two fold higher risk of  hypoglycemia, and more than 2x higher risk of macrosomia associated with glyburide use. This difference is likely to be secondary to hyperinsulinism in fetus secondary to fetal exposure to glyburide. Metformin, also had lower maternal weight gain, lower birth rate and less risk of macrosomia when compared with glyburide. When compared to insulin, Metformin had lower maternal weight gain, increased  birth and lower gestational age at delivery and lower incidence of gestational hypertension. There was a trend for less  hypoglycemia and high failure rate of 30-35%, when compared to insulin. In addition, the lack of long term data on glyburide and metformin use regarding safety was addressed and recommended use of Insulin for treatment, which is the gold standard, with excellent efficiency and safety, albeit more inconvenience.  Patient's questions were answered.    I recommend 2200 calorie ADA diet during pregnancy. It is recommended that she have 30-45 grams of carbohydrates with breakfast, a mid-morning snack with 15-30 grams of carbohydrates, 45-60 grams of carbohydrates with lunch, a mid-afternoon snack with 15-30 grams of carbohydrates, 45-60 grams of carbohydrates with  dinner, and a bedtime snack with 15-30 grams of carbohydrates.    Log reviewed.  There were limited sugars to review.  Accu-Chek in office 158 about 30 minutes postprandial.  After counseling on Insulin use and alternatives of metformin and glyburide with their benefits and risks, agreed to use metformin with start 500 mg p.o. b.i.d.. With metformin, there is risk for hypoglycemia. I discussed symptoms of hypoglycemia with recommendation to assess blood sugar then eat something high in sugar. I informed her that the best way to decrease episodes of low blood sugar is well balanced healthy diabetic diet with appropriate snacks between meals. Questions answered.    She was advised to check glucose 4 times a day. The need for strict blood sugar control with goals of treatment to keep pre-prandial blood sugars between 65 and 90 and 1 hr postprandial blood sugars less than 140 was discussed, especially with her history of LGA .    Use asa as discussed.    Fetal testing could be started in this setting around 30-32 weeks gestation. If additional complications occur, then closer fetal surveillance will be needed. She was advised to do fetal kick counts 3 times daily and PRN after 24 weeks, with immediate reporting of decreased fetal movements (<10 movements/hr).      Bipolar disorder   ACOG recommends that therapy for mental health disorders during pregnancy be individualized. Treatment of anxiety and depression should incorporate the clinical expertise of her mental health clinician, her obstetrician, her primary care provider, and her pediatrician. The risks of untreated depression and the benefits of treatment must be weighed against the risks associated with the use of psychiatric medications during pregnancy.    Second generation atypical antipsychotic medications include olanzapine (Zyprexa), aripiprazole (Abilify) and risperidone (Risperdal) like first generation antipsychotics such as haloperidol are used  chronic psychotic illnesses like schizophrenia. However, they are also used for bipolar disorder, anxiety disorders and obsessive compulsive disorders, making them commonly used in women of reproductive age.  Additional second generation antipsychotics include clozapine (Clozaril), ziprasidone (Geodon), paliperidone (Invega), quetiapine (Seroquel), asenapine (Saphris), lurasidone (Latuda), iloperidone (Fanapt), and lumateperone (Caplyta). Their effect on the fetus are not clear and data is accumulating.     Nevertheless, it is felt that second generation antipsychotics are not major teratogens. The risk of anomalies maybe slightly increased up to 4-5%, with cardiovascular malformations (VSD, ASD) being among common anomalies. Poor  adaptations are also more common among the exposed group. Given the potential adverse effects of uncontrolled psychiatric illness on  and obstetrical outcomes, a benefit risk assessment makes their use reasonable depending on severity of disease. Continued consultation with mental health provider is very helpful in coordinating care.   With patient unsure which medication she was taking that made her feel bad, we will reach out to the mental health provider for more information and work with them to get the patient restarted on a medication for her bipolar disorder, especially with her history of postpartum psychosis.  Emphasized the importance of following with mental health provider and advised her to report any worsening symptoms at anytime.      Elevated BMI   Body mass index is 34.45 kg/m².    I discussed the risk of miscarriage in first trimester, recurrent miscarriages, congenital anomalies, hypertension, diabetes,  labor and the higher risk of  section and the higher risk of fetal demise in-utero. There is also higher risk of for excessive fetal weight and large for gestational age (LGA) fetuses. Mothers with LGA fetuses are at higher risk of  prolonged labor,  delivery, shoulder dystocia and birth trauma. LGA neonates are increased risk of fetal hypoxia and intrauterine death, and are at risk to develop diabetes, obesity, metabolic syndrome, asthma and cancer later in life. She was advised of the importance of eating healthy and limiting weight gain to 11-15 lbs during the pregnancy, as optimal in this situation. I recommended low calorie, low fat diet avoiding any additional excessive weight gain. Excess weight gain would be associated with gestational hypertension, worsening diabetes and adverse  outcomes, including fetal demise in utero.      Importance of working on losing weight after the pregnancy is over, especially before a future pregnancy was discussed. Breastfeeding may be an important tool in reducing the postpartum weight retention. Fetal risks were discussed with short term risk of fetal/ obesity and long term risk of adolescent component of metabolic syndrome.      Previous  delivery  I discussed with her the increased risk of recurrence of another  delivery with risk around 1/3. The risk ranges from 15% with one previous  delivery after 32 weeks that was followed by a term at birth to 60% with history of 2 consecutive  deliveries before 32 weeks. I have shared with her that the  delivery could occur at an earlier gestational age with more significant morbidity and high risk of  mortality associated with that.    Discussed withdrawal of 17P by FDA and new guidelines for management of previous  delivery. Recommend intermittent transvaginal ultrasounds starting at 16 weeks until 23 weeks. If cervical length is less than 3 cm, weekly TVUS is recommended and may consider vaginal progesterone nightly. If cervix is less than 25 mm, consider cerclage, especially if previous  delivery less 28 weeks, than along with continuing vaginal progesterone nightly.  Conversely, may consider nightly vaginal progesterone nightly starting at 16 weeks until 37 weeks regardless of cervical length, although no data of benefit if cervix longer than 3 cm. Discussed risks/benefits of all options, understanding that neither option is fully protective against pregnancy loss. She would like to avoid vaginal progesterone and do cervical surveillance starting around 16 weeks.  Will plan to do TVUS start around 16 weeks then intermittently until 24 weeks. If cervical length less than 3 cm, will consider vaginal progesterone at that time. If cervical length less than 2.5 cm, consider cerclage along with vaginal progesterone. PTL precautions given.      Short interval pregnancy  A short interpregnancy interval is defined as an interval between the delivery of one pregnancy and the conception of a subsequent pregnancy. Most adverse pregnancy outcomes are associated with an IPI of < 6 months. A short IPI is associated with increased risk of maternal anemia, PPROM/PTB, delivery of a SGA infant, and placental abruption. Additionally, the risk of recurrent preeclampsia is higher with an IPI of < 12 months. Maternal anemia due to iron deficiency should be corrected. Additionally, the risk of uterine rupture is higher in women with a short interdelivery interval (< 18 months- birth to birth interval). Additionally,risk of TOLAC failure is higher and therefore, women should be counseled accordingly.  labor instructions given.      History of shoulder dystocia  I reviewed the patient's history which is remarkable for shoulder dystocia in previous pregnancy. Overall, the recurrence risk of shoulder dystocia in a subsequent pregnancy is about 10-15%, but higher if LGA infant. I counseled the patient regarding an attempt of vaginal delivery versus primary  delivery in subsequent pregnancies. We discussed several factors which may impact recommendations regarding mode of delivery, including  the severity of the prior shoulder dystocia, current estimated fetal weight compared with birth weight of the prior infant, and the presence of diabetes mellitus. The importance of strict blood sugar control to help and decreasing the risk of excessive fetal growth was discussed.  We will be monitoring the sugars closely and make adjustments on treatment accordingly.   The reported range of brachial plexus injury after a vaginal birth complicated by a shoulder dystocia varies from 4% to 40%. Most of the cases resolve and about 10% of infants have permanent neurologic injury. Patient's questions were answered and she will discuss this further with her primary obstetrician, especially with history of  section as it  may be the plan to repeat  section.         History of preeclampsia  Discussed higher risk of recurrence with her history of preeclampsia that could happen at earlier gestational age with morbidity/mortality of that.  With her risk factors for preeclampsia including preeclampsia/GHTN in a previous pregnancy and diabetes (T1DM or T2DM) , BMI over 30 and low socioeconomic status, discussed recommendations for low dose aspirin use to decrease risks for adverse outcomes, including preeclampsia, low birth weight and  delivery. Low-dose aspirin reduced the risk for preeclampsia by 15% in clinical trials and reduced the risk for  birth by 20% and FGR by 20%, and  mortality by around 20%.  After discussing risks/benefits of its use, it was agreed to start asa 81 mg BID after 12 weeks, due to multiple risk factors for preeclampsia. After discussing benefits (more effective than daily) vs potential risks (less data on safety with use at delivery), she agreed to start low dose aspirin twice daily and continue until 34 6/7weeks, then decrease to once daily until delivery. Also, recommend using in all future pregnancies.  Preeclampsia precautions given.    Multiple comorbidities  are present.  Management of diabetes in pregnancy discussed.  Patient did not want to use insulin agreed to use metformin 500 mg p.o. b.i.d. at this time.  Patient will be monitoring sugars more regularly and upload the sugars the portal in between her visits.  Recommended using aspirin twice a day after counseling on daily versus twice a day with the patient to go back after 34 weeks 2 daily.  Patient will benefit from starting the Keppra 500 mg b.i.d. as well as folic acid 4 mg daily up to 12 weeks then decrease to 1 mg daily after that.  We will plan cervical surveillance for history of  delivery.  Diet advice with proper weight gain recommendations discussed.  Importance of following with mental health provider and taking both mood stabilizing medication as well as anti depressive medication with bipolar disorder.      Follow up in about 2 weeks (around 2024) for MFM Followup.     Future Appointments   Date Time Provider Department Center   2024  2:00 PM Zhanna High MD [facility] [place]   10/2/2024 10:20 AM Isma Payne MD [facility]   2025  1:00 PM Isma Payne MD [facility]        Patient was evaluated by Brandi Maurer, DONALD, and and Dr. Juárez.  Final assessment and recommendations as stated above were made by Dr. Juárez.    This note was created with the assistance of [employer] voice recognition software. There may be transcription errors as a result of using this technology, however minimal. Effort has been made to ensure accuracy of transcription, but any obvious errors or omissions should be clarified with the author of the document.

## 2024-08-29 ENCOUNTER — PROCEDURE VISIT (OUTPATIENT)
Dept: MATERNAL FETAL MEDICINE | Facility: CLINIC | Age: 24
End: 2024-08-29
Payer: MEDICAID

## 2024-08-29 ENCOUNTER — OFFICE VISIT (OUTPATIENT)
Dept: MATERNAL FETAL MEDICINE | Facility: CLINIC | Age: 24
End: 2024-08-29
Payer: MEDICAID

## 2024-08-29 VITALS
WEIGHT: 207 LBS | SYSTOLIC BLOOD PRESSURE: 106 MMHG | HEIGHT: 65 IN | DIASTOLIC BLOOD PRESSURE: 65 MMHG | HEART RATE: 89 BPM | BODY MASS INDEX: 34.49 KG/M2

## 2024-08-29 DIAGNOSIS — G40.909 SEIZURE DISORDER DURING PREGNANCY IN THIRD TRIMESTER: ICD-10-CM

## 2024-08-29 DIAGNOSIS — O09.299 H/O SHOULDER DYSTOCIA IN PRIOR PREGNANCY, CURRENTLY PREGNANT: ICD-10-CM

## 2024-08-29 DIAGNOSIS — O99.211 SEVERE OBESITY DUE TO EXCESS CALORIES AFFECTING PREGNANCY IN FIRST TRIMESTER: ICD-10-CM

## 2024-08-29 DIAGNOSIS — F31.9 BIPOLAR DISEASE DURING PREGNANCY IN FIRST TRIMESTER: ICD-10-CM

## 2024-08-29 DIAGNOSIS — O24.113 PRE-EXISTING TYPE 2 DIABETES MELLITUS DURING PREGNANCY IN THIRD TRIMESTER: ICD-10-CM

## 2024-08-29 DIAGNOSIS — O24.111 PRE-EXISTING TYPE 2 DIABETES MELLITUS DURING PREGNANCY IN FIRST TRIMESTER: Primary | ICD-10-CM

## 2024-08-29 DIAGNOSIS — O99.341 BIPOLAR DISEASE DURING PREGNANCY IN FIRST TRIMESTER: ICD-10-CM

## 2024-08-29 DIAGNOSIS — O09.219 PREVIOUS PRETERM DELIVERY, ANTEPARTUM: ICD-10-CM

## 2024-08-29 DIAGNOSIS — O99.353 SEIZURE DISORDER DURING PREGNANCY IN THIRD TRIMESTER: ICD-10-CM

## 2024-08-29 DIAGNOSIS — O24.111 PRE-EXISTING TYPE 2 DIABETES MELLITUS DURING PREGNANCY IN FIRST TRIMESTER: ICD-10-CM

## 2024-08-29 DIAGNOSIS — O09.891 SHORT INTERVAL BETWEEN PREGNANCIES AFFECTING PREGNANCY IN FIRST TRIMESTER, ANTEPARTUM: ICD-10-CM

## 2024-08-29 DIAGNOSIS — O99.351 SEIZURE DISORDER DURING PREGNANCY IN FIRST TRIMESTER: Primary | ICD-10-CM

## 2024-08-29 DIAGNOSIS — G40.909 SEIZURE DISORDER DURING PREGNANCY IN FIRST TRIMESTER: Primary | ICD-10-CM

## 2024-08-29 DIAGNOSIS — E66.01 SEVERE OBESITY DUE TO EXCESS CALORIES AFFECTING PREGNANCY IN FIRST TRIMESTER: ICD-10-CM

## 2024-08-29 DIAGNOSIS — Z78.9 POOR HISTORIAN: ICD-10-CM

## 2024-08-29 DIAGNOSIS — O09.90 AT HIGH RISK FOR COMPLICATIONS OF INTRAUTERINE PREGNANCY (IUP): ICD-10-CM

## 2024-08-29 LAB — GLUCOSE SERPL-MCNC: 158 MG/DL (ref 70–110)

## 2024-08-29 RX ORDER — METFORMIN HYDROCHLORIDE 500 MG/1
500 TABLET ORAL 2 TIMES DAILY WITH MEALS
Qty: 60 TABLET | Refills: 6 | Status: SHIPPED | OUTPATIENT
Start: 2024-08-29 | End: 2025-03-27

## 2024-08-29 RX ORDER — LEVETIRACETAM 500 MG/1
500 TABLET ORAL 2 TIMES DAILY
Qty: 60 TABLET | Refills: 6 | Status: SHIPPED | OUTPATIENT
Start: 2024-08-29 | End: 2025-03-27

## 2024-08-30 ENCOUNTER — TELEPHONE (OUTPATIENT)
Dept: MATERNAL FETAL MEDICINE | Facility: CLINIC | Age: 24
End: 2024-08-30
Payer: MEDICAID

## 2024-08-30 NOTE — TELEPHONE ENCOUNTER
Reached out to [facility]. Spoke with nurse. She stated that per their records, the patient stopped her Abilify on 08/22/24 and that she stopped her Latuda mid July 2024.  She states that she recently had a follow-up with EKATERINA Reese a few days ago.  Discussed with her that we would prefer patient to be on mood stabilizer as well as antidepressant, with increased risk of postpartum psychosis with her bipolar disorder, especially with her history of this and would like for patient to possibly be brought back in sooner for discussion about restarting medications and that we would be happy to assist as needed. Nurse states that she will get with EKATERINA Reese and have him call clinic to discuss plan of care for patient.

## 2024-09-12 ENCOUNTER — TELEPHONE (OUTPATIENT)
Dept: MATERNAL FETAL MEDICINE | Facility: CLINIC | Age: 24
End: 2024-09-12

## 2024-09-16 ENCOUNTER — OFFICE VISIT (OUTPATIENT)
Dept: MATERNAL FETAL MEDICINE | Facility: CLINIC | Age: 24
End: 2024-09-16
Payer: MEDICAID

## 2024-09-16 VITALS
HEIGHT: 65 IN | HEART RATE: 96 BPM | SYSTOLIC BLOOD PRESSURE: 110 MMHG | DIASTOLIC BLOOD PRESSURE: 72 MMHG | BODY MASS INDEX: 33.49 KG/M2 | WEIGHT: 201 LBS

## 2024-09-16 DIAGNOSIS — G40.909 SEIZURE DISORDER DURING PREGNANCY IN FIRST TRIMESTER: Primary | ICD-10-CM

## 2024-09-16 DIAGNOSIS — O09.90 AT HIGH RISK FOR COMPLICATIONS OF INTRAUTERINE PREGNANCY (IUP): ICD-10-CM

## 2024-09-16 DIAGNOSIS — O99.211 SEVERE OBESITY DUE TO EXCESS CALORIES AFFECTING PREGNANCY IN FIRST TRIMESTER: ICD-10-CM

## 2024-09-16 DIAGNOSIS — O99.341 BIPOLAR DISEASE DURING PREGNANCY IN FIRST TRIMESTER: ICD-10-CM

## 2024-09-16 DIAGNOSIS — O24.111 PRE-EXISTING TYPE 2 DIABETES MELLITUS DURING PREGNANCY IN FIRST TRIMESTER: ICD-10-CM

## 2024-09-16 DIAGNOSIS — O09.291 HX OF PREECLAMPSIA, PRIOR PREGNANCY, CURRENTLY PREGNANT, FIRST TRIMESTER: ICD-10-CM

## 2024-09-16 DIAGNOSIS — E66.01 SEVERE OBESITY DUE TO EXCESS CALORIES AFFECTING PREGNANCY IN FIRST TRIMESTER: ICD-10-CM

## 2024-09-16 DIAGNOSIS — O99.351 SEIZURE DISORDER DURING PREGNANCY IN FIRST TRIMESTER: Primary | ICD-10-CM

## 2024-09-16 DIAGNOSIS — F31.9 BIPOLAR DISEASE DURING PREGNANCY IN FIRST TRIMESTER: ICD-10-CM

## 2024-09-16 DIAGNOSIS — O09.891 SHORT INTERVAL BETWEEN PREGNANCIES AFFECTING PREGNANCY IN FIRST TRIMESTER, ANTEPARTUM: ICD-10-CM

## 2024-09-16 DIAGNOSIS — O09.219 PREVIOUS PRETERM DELIVERY, ANTEPARTUM: ICD-10-CM

## 2024-09-16 LAB — GLUCOSE SERPL-MCNC: 116 MG/DL (ref 70–110)

## 2024-09-16 PROCEDURE — 1159F MED LIST DOCD IN RCRD: CPT | Mod: CPTII,S$GLB,, | Performed by: NURSE PRACTITIONER

## 2024-09-16 PROCEDURE — 3074F SYST BP LT 130 MM HG: CPT | Mod: CPTII,S$GLB,, | Performed by: NURSE PRACTITIONER

## 2024-09-16 PROCEDURE — 99214 OFFICE O/P EST MOD 30 MIN: CPT | Mod: TH,S$GLB,, | Performed by: NURSE PRACTITIONER

## 2024-09-16 PROCEDURE — 3044F HG A1C LEVEL LT 7.0%: CPT | Mod: CPTII,S$GLB,, | Performed by: NURSE PRACTITIONER

## 2024-09-16 PROCEDURE — 3078F DIAST BP <80 MM HG: CPT | Mod: CPTII,S$GLB,, | Performed by: NURSE PRACTITIONER

## 2024-09-16 PROCEDURE — 3008F BODY MASS INDEX DOCD: CPT | Mod: CPTII,S$GLB,, | Performed by: NURSE PRACTITIONER

## 2024-09-16 PROCEDURE — 1160F RVW MEDS BY RX/DR IN RCRD: CPT | Mod: CPTII,S$GLB,, | Performed by: NURSE PRACTITIONER

## 2024-09-16 PROCEDURE — 82962 GLUCOSE BLOOD TEST: CPT | Mod: ,,, | Performed by: NURSE PRACTITIONER

## 2024-09-16 NOTE — PROGRESS NOTES
Maternal Fetal Medicine Follow Up    Subjective:     Patient ID: [id number]    Chief Complaint: M follow up       HPI: Norma Denise is a 24 y.o. adult  at 11w3d gestation with Estimated Date of Delivery: 25  who is here for follow-up consultation by [facility].    She has type 2 diabetes, diagnosed as a child.  She is currently on metformin 500 mg BID.  She brought a blood sugar log for review.  She had hemoglobin A1c 6.0% on . She has history of shoulder dystocia in a previous pregnancy.  That child was 8 lb 4 oz at birth.  She also had preeclampsia in her previous pregnancy.  She plans to start low-dose aspirin twice a day after 12 weeks.  She has history of epilepsy, diagnosed as a child.  She was restarted on Keppra 500 mg BID at consult and is also taking folic acid 4 mg daily.  Last seizure was a few weeks ago that she reports thinks was elicited by anxiety.  She follows with Dr. High. She had increased BMI of 34.45 on initial M consult visit.  She has bipolar disorder, and she reported history of postpartum psychosis after her last delivery.   After her consult visit, we reached out to [facility] to try to verify patient's previous medication regimen. Nurse stated that per their records, the patient stopped her Abilify on 24 and that she stopped her Latuda mid 2024. Sooner appointment was recommended with [facility] for discussion and potentially resuming medication. Patient reports that she had appointment today but the clinic rescheduled.  She follows with [facility].  She had  rupture of membrane at 36 weeks 3 days in her last pregnancy.  She was delivered via  section due to nonreassuring fetal tracing in 2024.      Interval history since last M visit: None.. She denies any leaking fluid, vaginal bleeding, contractions, decreased fetal movement. Denies headaches, visual disturbances, or epigastric pain.    Pregnancy complications  include:   Patient Active Problem List   Diagnosis    Bipolar disease during pregnancy in first trimester    Pre-existing type 2 diabetes mellitus during pregnancy in first trimester    Seizure disorder during pregnancy in first trimester    H/O shoulder dystocia in prior pregnancy, currently pregnant    Hx of preeclampsia, prior pregnancy, currently pregnant, first trimester    At high risk for complications of intrauterine pregnancy (IUP)    Increased BMI affecting pregnancy in first trimester    Functional neurological symptom disorder with attacks or seizures    Localization-related (focal) (partial) idiopathic epilepsy and epileptic syndromes with seizures of localized onset, not intractable, without status epilepticus    Poor historian    Previous  delivery, antepartum    Short interval between pregnancies affecting pregnancy in first trimester, antepartum       No changes to medical, surgical, family, social, or obstetric history.    Medications:  Current Outpatient Medications   Medication Instructions    albuterol (PROVENTIL/VENTOLIN HFA) 90 mcg/actuation inhaler 1-2 puffs, Inhalation, Every 6 hours PRN, Rescue    aspirin (ECOTRIN) 162 mg, Oral, Daily, Start at 12 weeks gestation.  At 36 weeks gestation, decrease to one 81mg tablet daily.    blood sugar diagnostic (TRUE METRIX GLUCOSE TEST STRIP) Strp Use 1 strip to check glucose via meter four times daily    folic acid (FOLVITE) 4 mg, Oral, Daily    lancets Misc To check BG 4 times daily, to use with insurance preferred meter. Check blood glucose 4 times/day: every morning before you eat (fasting) as well as 1 hour after each meal (breakfast, lunch, dinner). Record results for MD to review.    levETIRAcetam (KEPPRA) 500 mg, Oral, 2 times daily    magnesium oxide (MAG-OX) 400 mg, Oral, Daily, For the prevention of headaches    metFORMIN (GLUCOPHAGE) 500 mg, Oral, 2 times daily with meals    metoclopramide HCl (REGLAN) 10 mg, Oral, 3 times daily with  "meals    prenatal vit no.130-iron-folic (PRENATAL VITAMIN) 27 mg iron- 800 mcg Tab 1 tablet, Oral, Daily       Review of Systems   12 point review of systems conducted, negative except as stated in the history of present illness. See HPI for details.      Objective:     Visit Vitals  /72 (BP Location: Left arm, Patient Position: Sitting, BP Method: Medium (Automatic))   Pulse 96   Ht 5' 5" (1.651 m)   Wt 91.2 kg (201 lb)   LMP 2024 (Approximate)   BMI 33.45 kg/m²        Physical Exam    Assessment/Plan:     24 y.o.  female with IUP at 11w3d    Seizure disorder   FHT present per Doppler today (2024).     I reviewed that the risk of seizure outweigh any risk of medication and advised her to continue current medication regimen (Keppra 500 mg BID), and continue folic acid 4 mg daily at this time until she is out of the 1st trimester. In the future, recommend folic acid supplementation with 4mg PO daily for 1-3 months prior to conception and throughout first trimester.        If she has any seizures, she needs to report that immediately.  It is also recommended to avoid driving or operating heavy/hazardous equipment until 6 months post last seizure.    It is recommended to optimize antiepileptic medications by monitoring levels under the guidance of neurology.  She was advised to maintain follow-up with neurology and continue medication as directed. Advised to keep follow-up with Dr. High as planned.    I recommend level 2 scan around 20 weeks and serial growth ultrasounds every 4-6 weeks starting starting at 26-28 weeks.  Fetal testing as below.    Breastfeeding can be at the patient's discretion.  Most studies have found that the benefits of breast feeding outweigh the risks of medication exposure. The patient was counseled to avoid triggers (sleep deprivation) and to ensure additional supervision with feeding, changing, and bathing baby after birth. Consideration of potential interaction " between her AED and desired birth control method should be reviewed in the third trimester and postpartum (by the patient's primary OB provider).        Type 2 diabetes   Risks associated with diabetes in pregnancy include higher risk for polyhydramnios, fetal macrosomia and  metabolic complications (hypoglycemia, hyperbilirubinemia, hypocalcemia, erythema).     Continue 2200 calorie ADA diet.     Log reviewed. Patient advised to adjust to metformin 500 mg with breakfast and 1000 mg with dinner.  She was given verbal and written instructions on medication change.  We also discussed the importance of diabetic diet.    She was advised to check glucose 4 times a day and send log/call with values weekly, and bring log to each visit.      Low dose aspirin as discussed.    Fetal testing could be started in this setting around 30 weeks gestation. She was advised to do fetal kick counts 3 times daily and PRN after 24 weeks, with immediate reporting of decreased fetal movements (<10 movements/hr).       Bipolar disorder   Previously discussed the risks of untreated psychiatric disease as well as medication specific risks in pregnancy.     Given her history, recommend to restart some type of medication for bipolar under the care of mental health provider.  She was advised to call [facility] to reschedule her appointment for asap.  She was advised to continue close follow up with her primary psychiatric provider throughout pregnancy and postpartum period. Advised her to report any worsening symptoms at anytime.    After delivery, she should have a follow-up appointment within 1-2 weeks with both her psychiatric provider as well as an early postpartum visit for a mood check, especially with her reported history of postpartum psychosis.  It would be reasonable to have a discussion about the benefits of breast feeding versus the potential risks of medication exposure in the breast milk.        Elevated BMI   Body mass  index is 33.45 kg/m². With relatively stable weight recently, she was advised to continue a healthy low caloric diet and diabetic diet. Excess weight gain would be associated with gestational hypertension, worsening diabetes and adverse  outcomes, including fetal demise in utero.    It is important to lose weight after the pregnancy is over, especially before a future pregnancy was discussed. Breastfeeding may be an important tool in reducing the postpartum weight retention. Fetal risks were discussed with short term risk of fetal/ obesity and long term risk of adolescent component of metabolic syndrome.      Previous  delivery  Previously discussed withdrawal of 17P by FDA and new guidelines for management of previous  delivery. She is currently in cervical length surveillance. Will continue to do TVUS intermittently until 24 weeks. If cervical length less than 3 cm, consider vaginal progesterone. If less than 2.5cm, consider cerclage along with continuing vaginal progesterone.     We will plan to do cervical surveillance starting at 16 weeks.  PTL precautions reviewed.       Short interval pregnancy  A short interpregnancy interval is defined as an interval between the delivery of one pregnancy and the conception of a subsequent pregnancy. Most adverse pregnancy outcomes are associated with an IPI of < 6 months. A short IPI is associated with increased risk of maternal anemia, PPROM/PTB, delivery of a SGA infant, and placental abruption. Additionally, the risk of recurrent preeclampsia is higher with an IPI of < 12 months. Maternal anemia due to iron deficiency should be corrected. Additionally, the risk of uterine rupture is higher in women with a short interdelivery interval (< 18 months- birth to birth interval). Additionally,risk of TOLAC failure is higher and therefore, women should be counseled accordingly.      History of shoulder dystocia  She is aware of risk of recurrence.   She was advised of the importance of optimal glucose control to reduce the risk of recurrence.      History of preeclampsia  With her increased risk for preeclampsia, she agreed to start asa 81 mg BID after 12 weeks, due to multiple risk factors for preeclampsia. After discussing benefits (more effective than daily) vs potential risks (less data on safety with use at delivery), she agreed to start low dose aspirin twice daily and continue until 34 6/7weeks, then decrease to once daily until delivery. Preeclampsia precautions reviewed.         Follow up in about 2 weeks (around 9/30/2024) for MFM Followup via Telemedicine.     Future Appointments   Date Time Provider Department Center   9/23/2024  2:00 PM Zhanna High MD [facility] [place]    10/2/2024 10:20 AM Isma Payne MD [facility] [place]   4/24/2025  1:00 PM Isma Payne MD [facility] [place] [facility]      DONALD [place]n     This note was created with the assistance of [employer] voice recognition software. There may be transcription errors as a result of using this technology, however minimal. Effort has been made to ensure accuracy of transcription, but any obvious errors or omissions should be clarified with the author of the document.

## 2024-09-19 NOTE — PROGRESS NOTES
[facility] Neurology Follow Up Telemedicine Visit Note    Initial Visit Date: 12/27/2023  Last Visit Date: 6/10/2024  Current Visit Date:  09/23/2024    Chief Complaint:     Chief Complaint   Patient presents with    Seizures     Patient states she has had 2 seizures in the last couple months.       History of Present Illness:      This is a real-time audio/video visit that was performed with the originating site at patient's home and the distant site, [facility]. Verbal consent to participate in interactive audio & video visit was obtained.    I discussed with the patient regarding the nature of our telehealth visits, that:    - Our sessions are not being recorded and that personal health information is protected  - Provider would evaluate the patient and recommend diagnostics and treatments based on my assessment  - [facility] will provide follow up care in person if/when the patient needs it.       This is 24 y.o. 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 No events reported as per patient. During last visit, levetiracetam 500 mg twice a day and folic acid 1 mg daily was restarted.     Age of Onset : childhood    Semiology:   nocturnal: jerking motion back to back lasting up to 1 hour   Behavioral arrest, lasting up to arms flailing, head with variable movement, eyes closed, lasting up to 20 minutes. Can occur back to back.     Frequency: 2 event since last visit, variable semiology. Last event was 9/16/2024    Provocation Factors: stress     Risk Factors  - Family history of seizure disorders:  Yes paternal grandmother, father, niece with seizure disorder.   - History of focal CNS lesions: Not Applicable  - History of CNS infections: No  - History head trauma with prolonged LOC: No  - History of childhood seizures, including  febrile seizures: Yes as above.   - History of development delay: Yes learning disability.    - History of underlying mood disorder: Yes bipolar disorder    - History of sleep disorder: No  - Recreational drug use: No  - Alcohol use: No  - Family planning and contraceptive use: no immediate plans on becoming pregnant. Not on birth control.     Medications:     Current Anti-Seizure Medication(s):  Levetiracetam 500 mg twice a day   Folic Acid 1 mg daily     Prior Anti-Seizure Medication(s):  Levetiracetam 500 mg twice a day (10/2023 to 3/4/2024): has stopped taking all medications.     Surgical Intervention & Devices:     - VNS:  - DBS  - RNS:  - Lobectomy:    Labs:     Results for orders placed or performed in visit on 09/16/24   POCT Glucose, Hand-Held Device   Result Value Ref Range    POC Glucose 116 (A) 70 - 110 MG/DL       Studies:      - routine EEG 9/19/2022 at [facility]:  This is a normal EEG without evidence of focal or  epileptiform activity as far as the artifact-obscured recording allows  interpretation    - one hour EEG:   - 24 hour EEG:  - Ambulatory EEG:  - EMU Video EEG:  - MRI Brain:   - NCHCT:  - WADA:    Review of Systems:     Review of Systems   All other systems reviewed and are negative.      Physical Exams:     Physical Exam  Nursing note reviewed.   Constitutional:       Appearance: Normal appearance.   HENT:      Head: Atraumatic.   Pulmonary:      Effort: Pulmonary effort is normal.   Musculoskeletal:         General: Normal range of motion.      Cervical back: Normal range of motion.   Neurological:      Mental Status: Arlen is alert.     Telemedicine Comprehensive Neurological Exam:  Mental Status: Alert Oriented to Self, Date, and Place. Comprehension wnl. No dysarthria.   CN II - XII: BRANDEN, VA grossly intact, No ptosis OU, EOMI without nystagmus, Hearing grossly intact, Face Symmetric, Tongue and Uvula midline, Trapezius symmetric bilateral.   Motor: no abnormal involuntary or voluntary  movements, Fine finger movements wnl b/l, No pronator drift.   Sensory: unable to assess.  Reflexes: unable to assess.   Cerebellar: RAHM wnl b/l.  Romberg: absent.   Gait: normal.      Assessment:     This is 24 y.o. 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 Still having nocturnal events.    Problem List Items Addressed This Visit          Neuro    Localization-related (focal) (partial) idiopathic epilepsy and epileptic syndromes with seizures of localized onset, not intractable, without status epilepticus    Relevant Medications    levETIRAcetam (KEPPRA) 750 MG Tab    Other Relevant Orders    Comprehensive metabolic panel    CBC auto differential    Levetiracetam Level       Psychiatric    Functional neurological symptom disorder with attacks or seizures - Primary    Relevant Medications    levETIRAcetam (KEPPRA) 750 MG Tab    folic acid (FOLVITE) 1 MG tablet    Other Relevant Orders    Comprehensive metabolic panel    CBC auto differential    Levetiracetam Level       Pulmonary    At high risk for complications of intrauterine pregnancy (IUP)    Relevant Medications    folic acid (FOLVITE) 1 MG tablet    Other Relevant Orders    Comprehensive metabolic panel    CBC auto differential    Levetiracetam Level         Plan:     [] increase levetiracetam to 750 mg twice a day   [] c/w folic acid   [] CBC, CMP, LEV level in 2 months     RTC 3 months with Telemedicine    Visit today is associated with current or anticipated ongoing medical care related to this patient's single serious condition/complex condition as documented above.     Seizure education provided: including family planning and teratogenicity of AEDs, risk of uncontrolled seizure disorder, SUDEP. No driving until seizure free for six months ([place] state law). No swimming, climbing heights, or operate heavy machinery without supervision.  Patient is advised to avoid Benadryl, Tramadol, Wellbutrin, flashing lights, alcohol, sleep deprivation, and certain anti-biotics that can lower seizure threshold.     I have explained the treatment plan, diagnosis, and prognosis to patient. All questions are answered to the best of my knowledge.     Face to face time 30 minutes, including documentation, chart review, counseling, education, review of test results, relevant medical records, and coordination of care.   I have explained the treatment plan, diagnosis, and prognosis to patient. All questions are answered to the best of my knowledge.         Zhanna High MD   General Neurology  09/23/2024

## 2024-09-23 ENCOUNTER — OFFICE VISIT (OUTPATIENT)
Dept: NEUROLOGY | Facility: CLINIC | Age: 24
End: 2024-09-23
Payer: MEDICAID

## 2024-09-23 DIAGNOSIS — O09.90 AT HIGH RISK FOR COMPLICATIONS OF INTRAUTERINE PREGNANCY (IUP): ICD-10-CM

## 2024-09-23 DIAGNOSIS — F44.5 FUNCTIONAL NEUROLOGICAL SYMPTOM DISORDER WITH ATTACKS OR SEIZURES: Primary | ICD-10-CM

## 2024-09-23 DIAGNOSIS — G40.009 LOCALIZATION-RELATED (FOCAL) (PARTIAL) IDIOPATHIC EPILEPSY AND EPILEPTIC SYNDROMES WITH SEIZURES OF LOCALIZED ONSET, NOT INTRACTABLE, WITHOUT STATUS EPILEPTICUS: ICD-10-CM

## 2024-09-23 RX ORDER — LEVETIRACETAM 750 MG/1
750 TABLET ORAL 2 TIMES DAILY
Qty: 60 TABLET | Refills: 3 | Status: SHIPPED | OUTPATIENT
Start: 2024-09-23 | End: 2025-01-21

## 2024-09-23 RX ORDER — FOLIC ACID 1 MG/1
4 TABLET ORAL DAILY
Qty: 120 TABLET | Refills: 3 | Status: SHIPPED | OUTPATIENT
Start: 2024-09-23 | End: 2025-09-23

## 2024-09-27 NOTE — PROGRESS NOTES
Maternal Fetal Medicine Follow Up via Telemedicine    The patient location is: [place]  The chief complaint leading to consultation is: T2DM    Visit type: audiovisual    Face to Face time with patient: 6 minutes    Each patient to whom he or she provides medical services by telemedicine is:  (1) informed of the relationship between the physician and patient and the respective role of any other health care provider with respect to management of the patient; and (2) notified that he or she may decline to receive medical services by telemedicine and may withdraw from such care at any time.    Notes:       Subjective:     Patient ID: [id number]    Chief Complaint: MFM follow up via Vitural Visit       HPI: Norma Denise is a 24 y.o. adult  at 13w3d gestation with Estimated Date of Delivery: 25  who is here for follow-up consultation by MFM.    She has type 2 diabetes, diagnosed as a child.  She is currently on metformin 500 mg in the morning and 1000 mg in the evening.  She brought a blood sugar log for review.  She had hemoglobin A1c 6.0% on . She has history of shoulder dystocia in a previous pregnancy.  That child was 8 lb 4 oz at birth.  She also had preeclampsia in her previous pregnancy.  She is on low-dose aspirin twice daily.  She has history of epilepsy, diagnosed as a child.  She was restarted on Keppra 500 mg BID at consult and is also taking folic acid 4 mg daily.  Last seizure was over a month ago, and she reports it was elicited by anxiety.  She follows with Dr. High. She had increased BMI of 34.45 on initial MFM consult visit.  She has bipolar disorder, and she reported history of postpartum psychosis after her last delivery.   After her consult visit, we reached out to [facility] to try to verify patient's previous medication regimen. Nurse stated that per their records, the patient stopped her Abilify on 24 and that she stopped her Latuda mid 2024. She follows with  [facility], and she reports that they took her off her medication because they were concerned about risks to the baby.  She reports she sees them almost daily.  She reports feeling well at this time.  She had  rupture of membrane at 36 weeks 3 days in her last pregnancy.  She was delivered via  section due to nonreassuring fetal tracing in 2024.      Interval history since last [facility] visit: None.. She denies any leaking fluid, vaginal bleeding, contractions, decreased fetal movement. Denies headaches, visual disturbances, or epigastric pain.    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    Medications:  Current Outpatient Medications   Medication Instructions    albuterol (PROVENTIL/VENTOLIN HFA) 90 mcg/actuation inhaler 1-2 puffs, Inhalation, Every 6 hours PRN, Rescue    aspirin (ECOTRIN) 162 mg, Oral, Daily, Start at 12 weeks gestation.  At 36 weeks gestation, decrease to one 81mg tablet daily.    blood sugar diagnostic (TRUE METRIX GLUCOSE TEST STRIP) Strp Use 1 strip to check glucose via meter four times daily    folic acid  "(FOLVITE) 4 mg, Oral, Daily    HumuLIN N NPH U-100 Insulin 12 Units, Subcutaneous, Nightly    insulin syringe-needle U-100 0.5 mL 31 gauge x 5/16" Syrg 1 Syringe, Misc.(Non-Drug; Combo Route), Nightly    lancets Misc To check BG 4 times daily, to use with insurance preferred meter. Check blood glucose 4 times/day: every morning before you eat (fasting) as well as 1 hour after each meal (breakfast, lunch, dinner). Record results for MD to review.    levETIRAcetam (KEPPRA) 750 mg, Oral, 2 times daily    magnesium oxide (MAG-OX) 400 mg, Oral, Daily, For the prevention of headaches    metFORMIN (GLUCOPHAGE) 500 mg, Oral, 2 times daily with meals    metoclopramide HCl (REGLAN) 10 mg, Oral, 3 times daily with meals    prenatal vit no.130-iron-folic (PRENATAL VITAMIN) 27 mg iron- 800 mcg Tab 1 tablet, Oral, Daily       Review of Systems   12 point review of systems conducted, negative except as stated in the history of present illness. See HPI for details.      Objective:     Visit Vitals  LMP 2024 (Approximate)        Physical Exam  Vitals and nursing note reviewed.   Constitutional:       Appearance: Normal appearance.      Comments: Increased BMI   HENT:      Nose: Nose normal.   Pulmonary:      Effort: Pulmonary effort is normal. No respiratory distress.   Musculoskeletal:         General: Normal range of motion.   Neurological:      General: No focal deficit present.      Mental Status: Arlen is alert and oriented to person, place, and time.   Psychiatric:         Mood and Affect: Mood normal.         Behavior: Behavior normal.         Thought Content: Thought content normal.         Judgment: Judgment normal.         Assessment/Plan:     24 y.o.  female with IUP at 13w3d    Seizure disorder   I reviewed that the risk of seizure outweigh any risk of medication and advised her to continue current medication regimen (Keppra 500 mg BID), and continue folic acid 4 mg daily at this time until she is out of the " 1st trimester. In the future, recommend folic acid supplementation with 4mg PO daily for 1-3 months prior to conception and throughout first trimester.        If she has any seizures, she needs to report that immediately.  It is also recommended to avoid driving or operating heavy/hazardous equipment until 6 months post last seizure.    It is recommended to optimize antiepileptic medications by monitoring levels under the guidance of neurology.  She was advised to maintain follow-up with neurology and continue medication as directed. Advised to keep follow-up with Dr. High as planned.    I recommend level 2 scan around 20 weeks and serial growth ultrasounds every 4-6 weeks starting starting at 26-28 weeks.  Fetal testing as below.    Breastfeeding can be at the patient's discretion.  Most studies have found that the benefits of breast feeding outweigh the risks of medication exposure. The patient was counseled to avoid triggers (sleep deprivation) and to ensure additional supervision with feeding, changing, and bathing baby after birth. Consideration of potential interaction between her AED and desired birth control method should be reviewed in the third trimester and postpartum (by the patient's primary OB provider).        Type 2 diabetes   Risks associated with diabetes in pregnancy include higher risk for polyhydramnios, fetal macrosomia and  metabolic complications (hypoglycemia, hyperbilirubinemia, hypocalcemia, erythema).     Continue 2200 calorie ADA diet.     Log reviewed.  Fasting values are mostly elevated.  Patient advised to continue metformin 500 mg with breakfast and 1000 mg with dinner.  Discussed option of starting insulin at this time with very elevated fasting values(along with risks and benefits associated), and patient agreed to do so.  She will start taking 12 units of NPH at bedtime.  We also discussed the importance of diabetic diet.    She was advised to check glucose 4 times a day  and send log/call with values weekly, and bring log to each visit.      Low dose aspirin as discussed.    Fetal testing could be started in this setting around 30 weeks gestation. She was advised to do fetal kick counts 3 times daily and PRN after 24 weeks, with immediate reporting of decreased fetal movements (<10 movements/hr).       Bipolar disorder   Previously discussed the risks of untreated psychiatric disease as well as medication specific risks in pregnancy.     Given her history, recommend to restart some type of medication for bipolar under the care of mental health provider.  She was advised to call [facility] to reschedule her appointment for asap.  She was advised to continue close follow up with her primary psychiatric provider throughout pregnancy and postpartum period. Advised her to report any worsening symptoms at anytime.    After delivery, she should have a follow-up appointment within 1-2 weeks with both her psychiatric provider as well as an early postpartum visit for a mood check, especially with her reported history of postpartum psychosis.  It would be reasonable to have a discussion about the benefits of breast feeding versus the potential risks of medication exposure in the breast milk.      Elevated BMI   She was advised to continue a healthy low caloric diet and diabetic diet. Excess weight gain would be associated with gestational hypertension, worsening diabetes and adverse  outcomes, including fetal demise in utero.    It is important to lose weight after the pregnancy is over, especially before a future pregnancy was discussed. Breastfeeding may be an important tool in reducing the postpartum weight retention. Fetal risks were discussed with short term risk of fetal/ obesity and long term risk of adolescent component of metabolic syndrome.      Previous  delivery  Previously discussed withdrawal of 17P by FDA and new guidelines for management of previous   delivery. She is currently in cervical length surveillance. Will continue to do TVUS intermittently until 24 weeks. If cervical length less than 3 cm, consider vaginal progesterone. If less than 2.5cm, consider cerclage along with continuing vaginal progesterone.     We will plan to do cervical surveillance starting at 16 weeks.  PTL precautions reviewed.       Short interval pregnancy  A short interpregnancy interval is defined as an interval between the delivery of one pregnancy and the conception of a subsequent pregnancy. Most adverse pregnancy outcomes are associated with an IPI of < 6 months. A short IPI is associated with increased risk of maternal anemia, PPROM/PTB, delivery of a SGA infant, and placental abruption. Additionally, the risk of recurrent preeclampsia is higher with an IPI of < 12 months. Maternal anemia due to iron deficiency should be corrected. Additionally, the risk of uterine rupture is higher in women with a short interdelivery interval (< 18 months- birth to birth interval). Additionally,risk of TOLAC failure is higher and therefore, women should be counseled accordingly.      History of shoulder dystocia  She is aware of risk of recurrence.  She was advised of the importance of optimal glucose control to reduce the risk of recurrence.      History of preeclampsia  With her increased risk for preeclampsia, she agreed to continue asa 81 mg BID until 34 6/7 weeks, then decrease to once daily until delivery. Preeclampsia precautions reviewed.    Follow up in about 2 weeks (around 10/14/2024) for [facility] follow-up, Transvaginal ultrasound.     Future Appointments   Date Time Provider Department Center   10/2/2024 10:20 AM Isma Payne MD [facility] [place]   10/14/2024  2:15 PM Nolan Juárez MD [facility]   10/14/2024  2:15 PM ROOM 3, [facility]   2025  1:00 PM Isma Payne MD [facility] [place] [facility]      Radhika Whitley PA-C     This note  was created with the assistance of "[employer] voice recognition software. There may be transcription errors as a result of using this technology, however minimal. Effort has been made to ensure accuracy of transcription, but any obvious errors or omissions should be clarified with the author of the document.

## 2024-09-30 ENCOUNTER — PATIENT MESSAGE (OUTPATIENT)
Dept: MATERNAL FETAL MEDICINE | Facility: CLINIC | Age: 24
End: 2024-09-30

## 2024-09-30 ENCOUNTER — TELEPHONE (OUTPATIENT)
Dept: MATERNAL FETAL MEDICINE | Facility: CLINIC | Age: 24
End: 2024-09-30
Payer: MEDICAID

## 2024-09-30 ENCOUNTER — OFFICE VISIT (OUTPATIENT)
Dept: MATERNAL FETAL MEDICINE | Facility: CLINIC | Age: 24
End: 2024-09-30
Payer: MEDICAID

## 2024-09-30 DIAGNOSIS — E66.01 SEVERE OBESITY DUE TO EXCESS CALORIES AFFECTING PREGNANCY IN FIRST TRIMESTER: ICD-10-CM

## 2024-09-30 DIAGNOSIS — F31.9 BIPOLAR DISEASE DURING PREGNANCY IN FIRST TRIMESTER: ICD-10-CM

## 2024-09-30 DIAGNOSIS — O99.211 SEVERE OBESITY DUE TO EXCESS CALORIES AFFECTING PREGNANCY IN FIRST TRIMESTER: ICD-10-CM

## 2024-09-30 DIAGNOSIS — O09.219 PREVIOUS PRETERM DELIVERY, ANTEPARTUM: ICD-10-CM

## 2024-09-30 DIAGNOSIS — O99.341 BIPOLAR DISEASE DURING PREGNANCY IN FIRST TRIMESTER: ICD-10-CM

## 2024-09-30 DIAGNOSIS — Z78.9 POOR HISTORIAN: ICD-10-CM

## 2024-09-30 DIAGNOSIS — O09.299 H/O SHOULDER DYSTOCIA IN PRIOR PREGNANCY, CURRENTLY PREGNANT: ICD-10-CM

## 2024-09-30 DIAGNOSIS — G40.909 SEIZURE DISORDER DURING PREGNANCY IN FIRST TRIMESTER: Primary | ICD-10-CM

## 2024-09-30 DIAGNOSIS — O09.891 SHORT INTERVAL BETWEEN PREGNANCIES AFFECTING PREGNANCY IN FIRST TRIMESTER, ANTEPARTUM: ICD-10-CM

## 2024-09-30 DIAGNOSIS — O24.111 PRE-EXISTING TYPE 2 DIABETES MELLITUS DURING PREGNANCY IN FIRST TRIMESTER: ICD-10-CM

## 2024-09-30 DIAGNOSIS — O09.291 HX OF PREECLAMPSIA, PRIOR PREGNANCY, CURRENTLY PREGNANT, FIRST TRIMESTER: ICD-10-CM

## 2024-09-30 DIAGNOSIS — O99.351 SEIZURE DISORDER DURING PREGNANCY IN FIRST TRIMESTER: Primary | ICD-10-CM

## 2024-09-30 PROCEDURE — 1159F MED LIST DOCD IN RCRD: CPT | Mod: CPTII,95,,

## 2024-09-30 PROCEDURE — 1160F RVW MEDS BY RX/DR IN RCRD: CPT | Mod: CPTII,95,,

## 2024-09-30 PROCEDURE — 3044F HG A1C LEVEL LT 7.0%: CPT | Mod: CPTII,95,,

## 2024-09-30 PROCEDURE — 99214 OFFICE O/P EST MOD 30 MIN: CPT | Mod: 95,TH,,

## 2024-09-30 RX ORDER — NAPROXEN SODIUM 220 MG
1 TABLET ORAL NIGHTLY
Qty: 100 EACH | Refills: 2 | Status: SHIPPED | OUTPATIENT
Start: 2024-09-30

## 2024-09-30 RX ORDER — INSULIN HUMAN 100 [IU]/ML
12 INJECTION, SUSPENSION SUBCUTANEOUS NIGHTLY
Qty: 20 ML | Refills: 3 | Status: SHIPPED | OUTPATIENT
Start: 2024-09-30

## 2024-10-09 DIAGNOSIS — O99.211 SEVERE OBESITY DUE TO EXCESS CALORIES AFFECTING PREGNANCY IN FIRST TRIMESTER: ICD-10-CM

## 2024-10-09 DIAGNOSIS — G40.909 SEIZURE DISORDER DURING PREGNANCY IN FIRST TRIMESTER: Primary | ICD-10-CM

## 2024-10-09 DIAGNOSIS — O09.219 PREVIOUS PRETERM DELIVERY, ANTEPARTUM: ICD-10-CM

## 2024-10-09 DIAGNOSIS — E66.01 SEVERE OBESITY DUE TO EXCESS CALORIES AFFECTING PREGNANCY IN FIRST TRIMESTER: ICD-10-CM

## 2024-10-09 DIAGNOSIS — O24.111 PRE-EXISTING TYPE 2 DIABETES MELLITUS DURING PREGNANCY IN FIRST TRIMESTER: ICD-10-CM

## 2024-10-09 DIAGNOSIS — F31.9 BIPOLAR DISEASE DURING PREGNANCY IN FIRST TRIMESTER: ICD-10-CM

## 2024-10-09 DIAGNOSIS — O99.351 SEIZURE DISORDER DURING PREGNANCY IN FIRST TRIMESTER: Primary | ICD-10-CM

## 2024-10-09 DIAGNOSIS — O99.341 BIPOLAR DISEASE DURING PREGNANCY IN FIRST TRIMESTER: ICD-10-CM

## 2024-10-11 PROBLEM — O09.892 SHORT INTERVAL BETWEEN PREGNANCIES AFFECTING PREGNANCY IN SECOND TRIMESTER, ANTEPARTUM: Status: ACTIVE | Noted: 2024-08-29

## 2024-10-11 NOTE — PROGRESS NOTES
Maternal Fetal Medicine Follow Up      Subjective:     Patient ID: [id number]    Chief Complaint: m followup w/us      HPI: Norma Denise is a 24 y.o. adult  at 15w3d gestation with Estimated Date of Delivery: 25  who is here for follow-up consultation by M.    She has type 2 diabetes, diagnosed as a child.  She is currently on metformin 500 mg in the morning and 1000 mg in the evening and supposed to be on 12 units NPH at bedtime but states she did not start taking it.  She brought a blood sugar log for review.  She had hemoglobin A1c 6.0% on . She has history of shoulder dystocia in a previous pregnancy.  That child was 8 lb 4 oz at birth.  She also had preeclampsia in her previous pregnancy.  She is on low-dose aspirin twice daily.  She has history of epilepsy, diagnosed as a child.  She is on Keppra 500 mg BID and folic acid 4 mg daily.  She has not had any recent seizure.  She follows with Dr. High. She had increased BMI of 34.45 on initial MFM consult visit.  She has bipolar disorder, and she reported history of postpartum psychosis after her last delivery.  She is not on any medication at this time, and feeling well.  She reports that she follows with [facility] almost daily.  She had  rupture of membrane at 36 weeks 3 days in her last pregnancy.  She was delivered via  section due to nonreassuring fetal tracing in 2024.      Interval history since last M visit: None.. She denies any leaking fluid, vaginal bleeding, contractions, decreased fetal movement. Denies headaches, visual disturbances, or epigastric pain.    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    Medications:  Current Outpatient Medications   Medication Instructions    albuterol (PROVENTIL/VENTOLIN HFA) 90 mcg/actuation inhaler 1-2 puffs, Inhalation, Every 6 hours PRN, Rescue    aspirin (ECOTRIN) 162 mg, Oral, Daily, Start at 12 weeks gestation.  At 36 weeks gestation, decrease to one 81mg tablet daily.    blood sugar diagnostic (TRUE METRIX GLUCOSE TEST STRIP) Strp Use 1 strip to check glucose via meter four times daily    folic acid (FOLVITE) 1 mg, Oral, Daily    HumuLIN N NPH U-100 Insulin 12 Units, Subcutaneous, Nightly    insulin syringe-needle U-100 0.5 mL 31 gauge x 5/16" Syrg 1 Syringe, Misc.(Non-Drug; Combo Route), Nightly    lancets Misc To check BG 4 times daily, to use with insurance preferred meter. Check blood glucose 4 times/day: every morning before you eat (fasting) as well as 1 hour after each meal (breakfast, lunch, dinner). Record results for MD to review.    levETIRAcetam (KEPPRA) 750 mg, Oral, 2 times daily    magnesium oxide (MAG-OX) 400 mg, Oral, Daily, For the prevention of headaches    metFORMIN (GLUCOPHAGE) 500 mg, Oral, 2 times daily with meals    prenatal vit no.130-iron-folic (PRENATAL VITAMIN) 27 mg iron- 800 mcg Tab 1 tablet, Oral, Daily       Review of Systems   12 point review of systems conducted, negative except as stated in the history of present illness. See HPI for details.      Objective:     Visit " "Vitals  /77   Pulse 96   Ht 5' 5" (1.651 m)   Wt 90.7 kg (200 lb)   LMP 2024 (Approximate)   BMI 33.28 kg/m²        Physical Exam  Vitals and nursing note reviewed.   Constitutional:       Appearance: Normal appearance.      Comments: Increased BMI   HENT:      Nose: Nose normal.   Pulmonary:      Effort: Pulmonary effort is normal. No respiratory distress.   Musculoskeletal:         General: Normal range of motion.   Neurological:      General: No focal deficit present.      Mental Status: Arlen is alert and oriented to person, place, and time.   Psychiatric:         Mood and Affect: Mood normal.         Behavior: Behavior normal.         Thought Content: Thought content normal.         Judgment: Judgment normal.         Assessment/Plan:     24 y.o.  female with IUP at 15w3d    Seizure disorder   FHT present per ultrasound today, 10/14/2024     I reviewed that the risk of seizure outweigh any risk of medication and advised her to continue current medication regimen (Keppra 500 mg BID), and continue folic acid 1mg daily. In the future, recommend folic acid supplementation with 4mg PO daily for 1-3 months prior to conception and throughout first trimester.        If she has any seizures, she needs to report that immediately.  It is also recommended to avoid driving or operating heavy/hazardous equipment until 6 months post last seizure.    It is recommended to optimize antiepileptic medications by monitoring levels under the guidance of neurology.  She was advised to maintain follow-up with neurology and continue medication as directed. Advised to keep follow-up with Dr. High as planned.    Fetal surveillance as below.    Breastfeeding can be at the patient's discretion.  Most studies have found that the benefits of breast feeding outweigh the risks of medication exposure. The patient was counseled to avoid triggers (sleep deprivation) and to ensure additional supervision with feeding, changing, and " bathing baby after birth. Consideration of potential interaction between her AED and desired birth control method should be reviewed in the third trimester and postpartum (by the patient's primary OB provider).        Type 2 diabetes   Risks associated with diabetes in pregnancy include higher risk for polyhydramnios, fetal macrosomia and  metabolic complications (hypoglycemia, hyperbilirubinemia, hypocalcemia, erythema).     Continue 2200 calorie ADA diet.     Log reviewed.  Fasting blood sugars are significantly elevated.  She was advised to continue metformin 500 mg with breakfast and 1000 mg with dinner; and she is to start 12 units of NPH at bedtime, as soon as possible.  I instructed the nurse to call the pharmacy to make sure that the medication is ready to be picked up and started..      She was advised to check glucose 4 times a day and send log/call with values weekly, and bring log to each visit.      Low dose aspirin as discussed.    We will plan to do follow-up growth ultrasounds around every 4 weeks after the level 2 scan.  Fetal testing could be started in this setting around 30 weeks gestatio    Bipolar disorder   Previously discussed the risks of untreated psychiatric disease as well as medication specific risks in pregnancy.     She was advised to continue close follow up with her primary psychiatric provider throughout pregnancy and postpartum period. Advised her to report any worsening symptoms at anytime.    After delivery, she should have a follow-up appointment within 1-2 weeks with both her psychiatric provider as well as an early postpartum visit for a mood check, especially with her reported history of postpartum psychosis.  It would be reasonable to have a discussion about the benefits of breast feeding versus the potential risks of medication exposure in the breast milk.      Elevated BMI   Body mass index is 33.28 kg/m². With patient losing about 7 lb in about 2 weeks.  Patient  was advised that we would be best to avoid losing more weight try to increase a little bit caloric intake and following a diabetic diet.  Excess weight gain would be associated with gestational hypertension, worsening diabetes and adverse  outcomes, including fetal demise in utero.    Reviewed importance of FKC 3/day and prn with instructions to immediately report any decreased fetal movement.    It is important to lose weight after the pregnancy is over, especially before a future pregnancy. Breastfeeding may be an important tool in reducing the postpartum weight retention. Fetal risks were discussed with short term risk of fetal/ obesity and long term risk of adolescent component of metabolic syndrome.      Previous  delivery  Previously discussed withdrawal of 17P by FDA and new guidelines for management of previous  delivery. She is currently in cervical length surveillance. Will continue to do TVUS intermittently until 24 weeks. If cervical length less than 3 cm, consider vaginal progesterone. If less than 2.5cm, consider cerclage along with continuing vaginal progesterone.     TVUS today 10/14/2024  with normal closed cervix 4 cm without funneling at the IO. No evidence of cervical insufficiency at this time. Will recheck in about 2 weeks. PTL precautions reviewed.      Short interval pregnancy  A short interpregnancy interval is defined as an interval between the delivery of one pregnancy and the conception of a subsequent pregnancy. Most adverse pregnancy outcomes are associated with an IPI of < 6 months. A short IPI is associated with increased risk of maternal anemia, PPROM/PTB, delivery of a SGA infant, and placental abruption. Additionally, the risk of recurrent preeclampsia is higher with an IPI of < 12 months. Maternal anemia due to iron deficiency should be corrected. Additionally, the risk of uterine rupture is higher in women with a short interdelivery interval (< 18  months- birth to birth interval). Additionally,risk of TOLAC failure is higher and therefore, women should be counseled accordingly.      History of shoulder dystocia  She is aware of risk of recurrence.  She was advised of the importance of optimal glucose control to reduce the risk of recurrence.      History of preeclampsia  With her increased risk for preeclampsia, she agreed to continue asa 81 mg BID until 34 6/7 weeks, then decrease to once daily until delivery. Preeclampsia precautions reviewed.    Patient has no seizures we will continue taking the folic acid and the Keppra 500 b.i.d..  With fasting blood sugars elevated, I urged the patient to start the 12 units of NPH at bedtime and call me in a few days with the sugars and will plan to see her back again in 2 weeks.  Her cervical length is stable.  No indication for vaginal progesterone.  Continue aspirin b.i.d..  Increase caloric intake and avoid continued weight loss.    Follow up in about 2 weeks (around 10/28/2024) for [facility] follow-up, Transvaginal ultrasound.     Future Appointments   Date Time Provider Department Center   10/28/2024  1:30 PM Nolan Juárez MD [facility]   10/28/2024  1:30 PM ROOM 3, [facility]   4/24/2025  1:00 PM Isma Payne MD [facility]      Patient was evaluated and examined by Dr. Juárez. DONALD Salazar, helped in pre charting of part of note.      This note was created with the assistance of [employer] voice recognition software. There may be transcription errors as a result of using this technology, however minimal. Effort has been made to ensure accuracy of transcription, but any obvious errors or omissions should be clarified with the author of the document.

## 2024-10-14 ENCOUNTER — OFFICE VISIT (OUTPATIENT)
Dept: MATERNAL FETAL MEDICINE | Facility: CLINIC | Age: 24
End: 2024-10-14
Payer: MEDICAID

## 2024-10-14 ENCOUNTER — PROCEDURE VISIT (OUTPATIENT)
Dept: MATERNAL FETAL MEDICINE | Facility: CLINIC | Age: 24
End: 2024-10-14
Payer: MEDICAID

## 2024-10-14 VITALS
SYSTOLIC BLOOD PRESSURE: 115 MMHG | HEIGHT: 65 IN | DIASTOLIC BLOOD PRESSURE: 77 MMHG | WEIGHT: 200 LBS | BODY MASS INDEX: 33.32 KG/M2 | HEART RATE: 96 BPM

## 2024-10-14 DIAGNOSIS — O99.212 SEVERE OBESITY DUE TO EXCESS CALORIES AFFECTING PREGNANCY IN SECOND TRIMESTER: ICD-10-CM

## 2024-10-14 DIAGNOSIS — O24.111 PRE-EXISTING TYPE 2 DIABETES MELLITUS DURING PREGNANCY IN FIRST TRIMESTER: ICD-10-CM

## 2024-10-14 DIAGNOSIS — O09.90 AT HIGH RISK FOR COMPLICATIONS OF INTRAUTERINE PREGNANCY (IUP): ICD-10-CM

## 2024-10-14 DIAGNOSIS — O99.211 SEVERE OBESITY DUE TO EXCESS CALORIES AFFECTING PREGNANCY IN FIRST TRIMESTER: ICD-10-CM

## 2024-10-14 DIAGNOSIS — O99.351 SEIZURE DISORDER DURING PREGNANCY IN FIRST TRIMESTER: ICD-10-CM

## 2024-10-14 DIAGNOSIS — E66.01 SEVERE OBESITY DUE TO EXCESS CALORIES AFFECTING PREGNANCY IN SECOND TRIMESTER: ICD-10-CM

## 2024-10-14 DIAGNOSIS — O09.892 SHORT INTERVAL BETWEEN PREGNANCIES AFFECTING PREGNANCY IN SECOND TRIMESTER, ANTEPARTUM: ICD-10-CM

## 2024-10-14 DIAGNOSIS — E66.01 SEVERE OBESITY DUE TO EXCESS CALORIES AFFECTING PREGNANCY IN FIRST TRIMESTER: ICD-10-CM

## 2024-10-14 DIAGNOSIS — F31.9 BIPOLAR DISEASE DURING PREGNANCY IN SECOND TRIMESTER: Primary | ICD-10-CM

## 2024-10-14 DIAGNOSIS — O99.341 BIPOLAR DISEASE DURING PREGNANCY IN FIRST TRIMESTER: ICD-10-CM

## 2024-10-14 DIAGNOSIS — G40.909 SEIZURE DISORDER DURING PREGNANCY IN FIRST TRIMESTER: ICD-10-CM

## 2024-10-14 DIAGNOSIS — O09.219 PREVIOUS PRETERM DELIVERY, ANTEPARTUM: ICD-10-CM

## 2024-10-14 DIAGNOSIS — O99.342 BIPOLAR DISEASE DURING PREGNANCY IN SECOND TRIMESTER: Primary | ICD-10-CM

## 2024-10-14 DIAGNOSIS — O09.292 HX OF PREECLAMPSIA, PRIOR PREGNANCY, CURRENTLY PREGNANT, SECOND TRIMESTER: ICD-10-CM

## 2024-10-14 DIAGNOSIS — O24.112 PRE-EXISTING TYPE 2 DIABETES MELLITUS DURING PREGNANCY IN SECOND TRIMESTER: ICD-10-CM

## 2024-10-14 DIAGNOSIS — Z78.9 POOR HISTORIAN: ICD-10-CM

## 2024-10-14 DIAGNOSIS — F44.5 FUNCTIONAL NEUROLOGICAL SYMPTOM DISORDER WITH ATTACKS OR SEIZURES: ICD-10-CM

## 2024-10-14 DIAGNOSIS — F31.9 BIPOLAR DISEASE DURING PREGNANCY IN FIRST TRIMESTER: ICD-10-CM

## 2024-10-14 LAB — GLUCOSE SERPL-MCNC: 118 MG/DL (ref 70–110)

## 2024-10-14 PROCEDURE — 3074F SYST BP LT 130 MM HG: CPT | Mod: CPTII,S$GLB,, | Performed by: OBSTETRICS & GYNECOLOGY

## 2024-10-14 PROCEDURE — 76817 TRANSVAGINAL US OBSTETRIC: CPT | Mod: S$GLB,,, | Performed by: OBSTETRICS & GYNECOLOGY

## 2024-10-14 PROCEDURE — 3008F BODY MASS INDEX DOCD: CPT | Mod: CPTII,S$GLB,, | Performed by: OBSTETRICS & GYNECOLOGY

## 2024-10-14 PROCEDURE — 3078F DIAST BP <80 MM HG: CPT | Mod: CPTII,S$GLB,, | Performed by: OBSTETRICS & GYNECOLOGY

## 2024-10-14 PROCEDURE — 3044F HG A1C LEVEL LT 7.0%: CPT | Mod: CPTII,S$GLB,, | Performed by: OBSTETRICS & GYNECOLOGY

## 2024-10-14 PROCEDURE — 1159F MED LIST DOCD IN RCRD: CPT | Mod: CPTII,S$GLB,, | Performed by: OBSTETRICS & GYNECOLOGY

## 2024-10-14 PROCEDURE — 1160F RVW MEDS BY RX/DR IN RCRD: CPT | Mod: CPTII,S$GLB,, | Performed by: OBSTETRICS & GYNECOLOGY

## 2024-10-14 PROCEDURE — 99214 OFFICE O/P EST MOD 30 MIN: CPT | Mod: TH,S$GLB,, | Performed by: OBSTETRICS & GYNECOLOGY

## 2024-10-14 PROCEDURE — 82962 GLUCOSE BLOOD TEST: CPT | Mod: ,,, | Performed by: OBSTETRICS & GYNECOLOGY

## 2024-10-14 RX ORDER — FOLIC ACID 1 MG/1
1 TABLET ORAL DAILY
Qty: 30 TABLET | Refills: 3 | Status: SHIPPED | OUTPATIENT
Start: 2024-10-14

## 2024-10-23 DIAGNOSIS — O24.112 PRE-EXISTING TYPE 2 DIABETES MELLITUS DURING PREGNANCY IN SECOND TRIMESTER: Primary | ICD-10-CM

## 2024-10-23 DIAGNOSIS — E66.01 SEVERE OBESITY DUE TO EXCESS CALORIES AFFECTING PREGNANCY IN SECOND TRIMESTER: ICD-10-CM

## 2024-10-23 DIAGNOSIS — O09.219 PREVIOUS PRETERM DELIVERY, ANTEPARTUM: ICD-10-CM

## 2024-10-23 DIAGNOSIS — O99.351 SEIZURE DISORDER DURING PREGNANCY IN FIRST TRIMESTER: ICD-10-CM

## 2024-10-23 DIAGNOSIS — G40.909 SEIZURE DISORDER DURING PREGNANCY IN FIRST TRIMESTER: ICD-10-CM

## 2024-10-23 DIAGNOSIS — O09.292 HX OF PREECLAMPSIA, PRIOR PREGNANCY, CURRENTLY PREGNANT, SECOND TRIMESTER: ICD-10-CM

## 2024-10-23 DIAGNOSIS — O99.212 SEVERE OBESITY DUE TO EXCESS CALORIES AFFECTING PREGNANCY IN SECOND TRIMESTER: ICD-10-CM

## 2024-10-30 ENCOUNTER — OFFICE VISIT (OUTPATIENT)
Dept: MATERNAL FETAL MEDICINE | Facility: CLINIC | Age: 24
End: 2024-10-30
Payer: MEDICAID

## 2024-10-30 ENCOUNTER — PROCEDURE VISIT (OUTPATIENT)
Dept: MATERNAL FETAL MEDICINE | Facility: CLINIC | Age: 24
End: 2024-10-30
Payer: MEDICAID

## 2024-10-30 VITALS
HEART RATE: 92 BPM | DIASTOLIC BLOOD PRESSURE: 64 MMHG | HEIGHT: 65 IN | SYSTOLIC BLOOD PRESSURE: 113 MMHG | WEIGHT: 200.63 LBS | BODY MASS INDEX: 33.43 KG/M2

## 2024-10-30 DIAGNOSIS — O99.342 BIPOLAR DISEASE DURING PREGNANCY IN SECOND TRIMESTER: ICD-10-CM

## 2024-10-30 DIAGNOSIS — O09.892 SHORT INTERVAL BETWEEN PREGNANCIES AFFECTING PREGNANCY IN SECOND TRIMESTER, ANTEPARTUM: ICD-10-CM

## 2024-10-30 DIAGNOSIS — Z78.9 POOR HISTORIAN: ICD-10-CM

## 2024-10-30 DIAGNOSIS — O24.112 PRE-EXISTING TYPE 2 DIABETES MELLITUS DURING PREGNANCY IN SECOND TRIMESTER: ICD-10-CM

## 2024-10-30 DIAGNOSIS — Z91.199 NONCOMPLIANCE: ICD-10-CM

## 2024-10-30 DIAGNOSIS — O99.212 OBESITY AFFECTING PREGNANCY IN SECOND TRIMESTER, UNSPECIFIED OBESITY TYPE: ICD-10-CM

## 2024-10-30 DIAGNOSIS — O99.352 SEIZURE DISORDER DURING PREGNANCY IN SECOND TRIMESTER: Primary | ICD-10-CM

## 2024-10-30 DIAGNOSIS — O09.292 HX OF PREECLAMPSIA, PRIOR PREGNANCY, CURRENTLY PREGNANT, SECOND TRIMESTER: ICD-10-CM

## 2024-10-30 DIAGNOSIS — O09.219 PREVIOUS PRETERM DELIVERY, ANTEPARTUM: ICD-10-CM

## 2024-10-30 DIAGNOSIS — O99.351 SEIZURE DISORDER DURING PREGNANCY IN FIRST TRIMESTER: ICD-10-CM

## 2024-10-30 DIAGNOSIS — G40.909 SEIZURE DISORDER DURING PREGNANCY IN FIRST TRIMESTER: ICD-10-CM

## 2024-10-30 DIAGNOSIS — G40.909 SEIZURE DISORDER DURING PREGNANCY IN SECOND TRIMESTER: Primary | ICD-10-CM

## 2024-10-30 DIAGNOSIS — O99.212 SEVERE OBESITY DUE TO EXCESS CALORIES AFFECTING PREGNANCY IN SECOND TRIMESTER: ICD-10-CM

## 2024-10-30 DIAGNOSIS — E66.01 SEVERE OBESITY DUE TO EXCESS CALORIES AFFECTING PREGNANCY IN SECOND TRIMESTER: ICD-10-CM

## 2024-10-30 DIAGNOSIS — O09.90 AT HIGH RISK FOR COMPLICATIONS OF INTRAUTERINE PREGNANCY (IUP): ICD-10-CM

## 2024-10-30 DIAGNOSIS — F31.9 BIPOLAR DISEASE DURING PREGNANCY IN SECOND TRIMESTER: ICD-10-CM

## 2024-10-30 LAB — GLUCOSE SERPL-MCNC: 175 MG/DL (ref 70–110)

## 2024-10-30 PROCEDURE — 1159F MED LIST DOCD IN RCRD: CPT | Mod: CPTII,S$GLB,, | Performed by: OBSTETRICS & GYNECOLOGY

## 2024-10-30 PROCEDURE — 76817 TRANSVAGINAL US OBSTETRIC: CPT | Mod: S$GLB,,, | Performed by: OBSTETRICS & GYNECOLOGY

## 2024-10-30 PROCEDURE — 3044F HG A1C LEVEL LT 7.0%: CPT | Mod: CPTII,S$GLB,, | Performed by: OBSTETRICS & GYNECOLOGY

## 2024-10-30 PROCEDURE — 82962 GLUCOSE BLOOD TEST: CPT | Mod: ,,, | Performed by: OBSTETRICS & GYNECOLOGY

## 2024-10-30 PROCEDURE — 3078F DIAST BP <80 MM HG: CPT | Mod: CPTII,S$GLB,, | Performed by: OBSTETRICS & GYNECOLOGY

## 2024-10-30 PROCEDURE — 99214 OFFICE O/P EST MOD 30 MIN: CPT | Mod: TH,S$GLB,, | Performed by: OBSTETRICS & GYNECOLOGY

## 2024-10-30 PROCEDURE — 3008F BODY MASS INDEX DOCD: CPT | Mod: CPTII,S$GLB,, | Performed by: OBSTETRICS & GYNECOLOGY

## 2024-10-30 PROCEDURE — 3074F SYST BP LT 130 MM HG: CPT | Mod: CPTII,S$GLB,, | Performed by: OBSTETRICS & GYNECOLOGY

## 2024-10-30 PROCEDURE — 1160F RVW MEDS BY RX/DR IN RCRD: CPT | Mod: CPTII,S$GLB,, | Performed by: OBSTETRICS & GYNECOLOGY

## 2024-11-04 DIAGNOSIS — O24.112 PRE-EXISTING TYPE 2 DIABETES MELLITUS DURING PREGNANCY IN SECOND TRIMESTER: ICD-10-CM

## 2024-11-04 DIAGNOSIS — O09.219 PREVIOUS PRETERM DELIVERY, ANTEPARTUM: ICD-10-CM

## 2024-11-04 DIAGNOSIS — O99.212 OBESITY AFFECTING PREGNANCY IN SECOND TRIMESTER, UNSPECIFIED OBESITY TYPE: ICD-10-CM

## 2024-11-04 DIAGNOSIS — F31.9 BIPOLAR DISEASE DURING PREGNANCY IN SECOND TRIMESTER: ICD-10-CM

## 2024-11-04 DIAGNOSIS — O09.90 AT HIGH RISK FOR COMPLICATIONS OF INTRAUTERINE PREGNANCY (IUP): ICD-10-CM

## 2024-11-04 DIAGNOSIS — Z36.89 ENCOUNTER FOR FETAL ANATOMIC SURVEY: ICD-10-CM

## 2024-11-04 DIAGNOSIS — O99.342 BIPOLAR DISEASE DURING PREGNANCY IN SECOND TRIMESTER: ICD-10-CM

## 2024-11-04 DIAGNOSIS — O99.352 SEIZURE DISORDER DURING PREGNANCY IN SECOND TRIMESTER: Primary | ICD-10-CM

## 2024-11-04 DIAGNOSIS — G40.909 SEIZURE DISORDER DURING PREGNANCY IN SECOND TRIMESTER: Primary | ICD-10-CM

## 2024-11-04 DIAGNOSIS — O09.292 HX OF PREECLAMPSIA, PRIOR PREGNANCY, CURRENTLY PREGNANT, SECOND TRIMESTER: ICD-10-CM

## 2024-11-04 DIAGNOSIS — O09.892 SHORT INTERVAL BETWEEN PREGNANCIES AFFECTING PREGNANCY IN SECOND TRIMESTER, ANTEPARTUM: ICD-10-CM

## 2024-11-14 ENCOUNTER — PROCEDURE VISIT (OUTPATIENT)
Dept: MATERNAL FETAL MEDICINE | Facility: CLINIC | Age: 24
End: 2024-11-14
Payer: MEDICAID

## 2024-11-14 ENCOUNTER — OFFICE VISIT (OUTPATIENT)
Dept: MATERNAL FETAL MEDICINE | Facility: CLINIC | Age: 24
End: 2024-11-14
Payer: MEDICAID

## 2024-11-14 ENCOUNTER — DOCUMENTATION ONLY (OUTPATIENT)
Dept: MATERNAL FETAL MEDICINE | Facility: CLINIC | Age: 24
End: 2024-11-14
Payer: MEDICAID

## 2024-11-14 VITALS
HEART RATE: 87 BPM | HEIGHT: 65 IN | BODY MASS INDEX: 33.49 KG/M2 | SYSTOLIC BLOOD PRESSURE: 111 MMHG | DIASTOLIC BLOOD PRESSURE: 67 MMHG | WEIGHT: 201 LBS

## 2024-11-14 DIAGNOSIS — O99.352 SEIZURE DISORDER DURING PREGNANCY IN SECOND TRIMESTER: ICD-10-CM

## 2024-11-14 DIAGNOSIS — O09.892 SHORT INTERVAL BETWEEN PREGNANCIES AFFECTING PREGNANCY IN SECOND TRIMESTER, ANTEPARTUM: ICD-10-CM

## 2024-11-14 DIAGNOSIS — Z91.199 NONCOMPLIANCE: ICD-10-CM

## 2024-11-14 DIAGNOSIS — O24.112 PRE-EXISTING TYPE 2 DIABETES MELLITUS DURING PREGNANCY IN SECOND TRIMESTER: ICD-10-CM

## 2024-11-14 DIAGNOSIS — G40.909 SEIZURE DISORDER DURING PREGNANCY IN SECOND TRIMESTER: Primary | ICD-10-CM

## 2024-11-14 DIAGNOSIS — O99.342 BIPOLAR DISEASE DURING PREGNANCY IN SECOND TRIMESTER: ICD-10-CM

## 2024-11-14 DIAGNOSIS — F31.9 BIPOLAR DISEASE DURING PREGNANCY IN SECOND TRIMESTER: ICD-10-CM

## 2024-11-14 DIAGNOSIS — Z78.9 POOR HISTORIAN: ICD-10-CM

## 2024-11-14 DIAGNOSIS — O09.219 PREVIOUS PRETERM DELIVERY, ANTEPARTUM: ICD-10-CM

## 2024-11-14 DIAGNOSIS — O09.292 HX OF PREECLAMPSIA, PRIOR PREGNANCY, CURRENTLY PREGNANT, SECOND TRIMESTER: ICD-10-CM

## 2024-11-14 DIAGNOSIS — O99.352 SEIZURE DISORDER DURING PREGNANCY IN SECOND TRIMESTER: Primary | ICD-10-CM

## 2024-11-14 DIAGNOSIS — G40.909 SEIZURE DISORDER DURING PREGNANCY IN SECOND TRIMESTER: ICD-10-CM

## 2024-11-14 DIAGNOSIS — O09.90 AT HIGH RISK FOR COMPLICATIONS OF INTRAUTERINE PREGNANCY (IUP): ICD-10-CM

## 2024-11-14 DIAGNOSIS — O99.212 OBESITY AFFECTING PREGNANCY IN SECOND TRIMESTER, UNSPECIFIED OBESITY TYPE: ICD-10-CM

## 2024-11-14 DIAGNOSIS — Z36.89 ENCOUNTER FOR FETAL ANATOMIC SURVEY: ICD-10-CM

## 2024-11-14 LAB — GLUCOSE SERPL-MCNC: 102 MG/DL (ref 70–110)

## 2024-11-14 NOTE — PROGRESS NOTES
Maternal Fetal Medicine Follow Up      Subjective:     Patient ID: [id number]    Chief Complaint: MFM follow up with US (Type 2 Diabetes.  Patient did not bring sugar log Fasting 87- 180, Post meals 98- 150.)      HPI: Norma Denise is a 24 y.o. adult  at 19w6d gestation with Estimated Date of Delivery: 25  who is here for follow-up consultation by M.    She has type 2 diabetes, diagnosed as a child.  She is currently on metformin 500 mg in the morning and 1000 mg in the evening as well as 12 units NPH at bedtime.  She did not bring a log for review but reports blood glucose as above.  She had hemoglobin A1c 6.0% on . She has history of shoulder dystocia in a previous pregnancy.  That child was 8 lb 4 oz at birth.  She also had preeclampsia in her previous pregnancy.  She is on low-dose aspirin twice daily.  She has history of epilepsy, diagnosed as a child.  She is on Keppra 750 mg BID and folic acid 1 mg daily.  She has not had any recent seizures.  She follows with Dr. High. She had increased BMI of 34.45 on initial MFM consult visit.  She has bipolar disorder, and she reported history of postpartum psychosis after her last delivery.  She was advised to restart a mood stabilizer under the care of mental health provider, but has not done that yet.   She reports that she follows with [facility] almost daily, and is feeling well at this time.  She had  rupture of membrane at 36 weeks 3 days in her last pregnancy.  She is in cervical length surveillance.  This is a short interval pregnancy.      Interval history since last M visit: None.. She denies any leaking fluid, vaginal bleeding, contractions, decreased fetal movement. Denies headaches, visual disturbances, or epigastric pain.    Pregnancy complications include:   Patient Active Problem List   Diagnosis    Bipolar disease during pregnancy in second trimester    Pre-existing type 2 diabetes mellitus during pregnancy in second  "trimester    Seizure disorder during pregnancy in second trimester    Hx of preeclampsia, prior pregnancy, currently pregnant, second trimester    At high risk for complications of intrauterine pregnancy (IUP)    Obesity affecting pregnancy in second trimester    Functional neurological symptom disorder with attacks or seizures    Localization-related (focal) (partial) idiopathic epilepsy and epileptic syndromes with seizures of localized onset, not intractable, without status epilepticus    Poor historian    Previous  delivery, antepartum    Short interval between pregnancies affecting pregnancy in second trimester, antepartum    Noncompliance       No changes to medical, surgical, family, social, or obstetric history.    Medications:  Current Outpatient Medications   Medication Instructions    albuterol (PROVENTIL/VENTOLIN HFA) 90 mcg/actuation inhaler 1-2 puffs, Inhalation, Every 6 hours PRN, Rescue    aspirin (ECOTRIN) 162 mg, Oral, Daily, Start at 12 weeks gestation.  At 36 weeks gestation, decrease to one 81mg tablet daily.    blood sugar diagnostic (TRUE METRIX GLUCOSE TEST STRIP) Strp Use 1 strip to check glucose via meter four times daily    folic acid (FOLVITE) 1 mg, Oral, Daily    HumuLIN N NPH U-100 Insulin 12 Units, Subcutaneous, Nightly    insulin syringe-needle U-100 0.5 mL 31 gauge x 5/16" Syrg 1 Syringe, Misc.(Non-Drug; Combo Route), Nightly    lancets Misc To check BG 4 times daily, to use with insurance preferred meter. Check blood glucose 4 times/day: every morning before you eat (fasting) as well as 1 hour after each meal (breakfast, lunch, dinner). Record results for MD to review.    levETIRAcetam (KEPPRA) 750 mg, Oral, 2 times daily    magnesium oxide (MAG-OX) 400 mg, Oral, Daily, For the prevention of headaches    metFORMIN (GLUCOPHAGE) 500 mg, Oral, 2 times daily with meals    prenatal vit no.130-iron-folic (PRENATAL VITAMIN) 27 mg iron- 800 mcg Tab 1 tablet, Oral, Daily       Review " "of Systems   12 point review of systems conducted, negative except as stated in the history of present illness. See HPI for details.      Objective:     Visit Vitals  /67 (BP Location: Left arm, Patient Position: Sitting)   Pulse 87   Ht 5' 5" (1.651 m)   Wt 91.2 kg (201 lb)   LMP 2024 (Approximate)   BMI 33.45 kg/m²        Physical Exam  Vitals and nursing note reviewed.   Constitutional:       Appearance: Normal appearance.   HENT:      Head: Normocephalic and atraumatic.   Cardiovascular:      Rate and Rhythm: Normal rate and regular rhythm.   Pulmonary:      Effort: Pulmonary effort is normal. No respiratory distress.      Breath sounds: Normal breath sounds.   Abdominal:      Palpations: Abdomen is soft.      Tenderness: There is no abdominal tenderness.   Musculoskeletal:      Right lower leg: No edema.      Left lower leg: No edema.   Neurological:      Mental Status: Arlen is alert and oriented to person, place, and time.   Psychiatric:         Mood and Affect: Mood normal.         Behavior: Behavior normal.         Thought Content: Thought content normal.         Judgment: Judgment normal.         Assessment/Plan:     24 y.o.  female with IUP at 19w6d    Seizure disorder   There is normal fetal growth with no anomalies seen on fetal anatomy scan on 2024.  AFV is normal.     I reviewed that the risk of seizure outweigh any risk of medication and advised her to continue current medication regimen (Keppra 750 mg BID), and continue folic acid 1mg daily. In the future, recommend folic acid supplementation with 4mg PO daily for 1-3 months prior to conception and throughout first trimester.        If she has any seizures, she needs to report that immediately.  It is also recommended to avoid driving or operating heavy/hazardous equipment until 6 months post last seizure.    It is recommended to optimize antiepileptic medications by monitoring levels under the guidance of neurology.  She was " advised to maintain follow-up with neurology and continue medication as directed. Advised to keep follow-up with Dr. High as planned.    Fetal surveillance as below.    Breastfeeding can be at the patient's discretion.  Most studies have found that the benefits of breast feeding outweigh the risks of medication exposure. The patient was counseled to avoid triggers (sleep deprivation) and to ensure additional supervision with feeding, changing, and bathing baby after birth. Consideration of potential interaction between her AED and desired birth control method should be reviewed in the third trimester and postpartum (by the patient's primary OB provider).        Type 2 diabetes   Risks associated with diabetes in pregnancy include higher risk for polyhydramnios, fetal macrosomia and  metabolic complications (hypoglycemia, hyperbilirubinemia, hypocalcemia, erythema).     Continue 2200 calorie ADA diet.     Values discussed.  Patient had a big range of blood sugars reported.  However she reports most of the fasting blood sugars are over 100.  There are some afternoon sugars that are elevated the 140s 150s.  She was advised to continue  metformin 500 mg with breakfast and 1000 mg with dinner; and start 12 units of NPH in the morning, 12 units of NPH at bedtime.     She was advised to continue to check the glucose twice a day, alternating times, and emphasized importance of bringing log to each appointment , as log is crucial to making accurate adjustments for the blood glucose.    Low dose aspirin as discussed.  Fetal echo referral sent today 2024  We will plan to do follow-up growth ultrasounds around every 4 weeks.  Fetal testing could be started in this setting around 28-30 weeks gestation.      Bipolar disorder   Previously discussed the risks of untreated psychiatric disease as well as medication specific risks in pregnancy.     Recommended restarting a mood stabilizer.  She was advised to continue  close follow up with her primary psychiatric provider throughout pregnancy and postpartum period. Advised her to report any worsening symptoms at anytime.    After delivery, she should have a follow-up appointment within 1-2 weeks with both her psychiatric provider as well as an early postpartum visit for a mood check, especially with her reported history of postpartum psychosis.  It would be reasonable to have a discussion about the benefits of breast feeding versus the potential risks of medication exposure in the breast milk.      Elevated BMI   Body mass index is 33.45 kg/m². With relatively stable weight recently, she was advised to continue a healthy low caloric diet and diabetic diet.  Excess weight gain would be associated with gestational hypertension, worsening diabetes and adverse  outcomes, including fetal demise in utero.    Reviewed importance of FKC 3/day and prn with instructions to immediately report any decreased fetal movement.    It is important to lose weight after the pregnancy is over, especially before a future pregnancy. Breastfeeding may be an important tool in reducing the postpartum weight retention. Fetal risks were discussed with short term risk of fetal/ obesity and long term risk of adolescent component of metabolic syndrome.      Previous  delivery  Previously discussed withdrawal of 17P by FDA and new guidelines for management of previous  delivery. She is currently in cervical length surveillance. Will continue to do TVUS intermittently until 24 weeks. If cervical length less than 3 cm, consider vaginal progesterone. If less than 2.5cm, consider cerclage along with continuing vaginal progesterone.     TVUS today 2024  with normal closed cervix 3.4 cm without funneling at the IO. No evidence of cervical insufficiency at this time. Will recheck in about 2 weeks. PTL precautions reviewed.      Short interval pregnancy  A short interpregnancy  interval is defined as an interval between the delivery of one pregnancy and the conception of a subsequent pregnancy. Most adverse pregnancy outcomes are associated with an IPI of < 6 months. A short IPI is associated with increased risk of maternal anemia, PPROM/PTB, delivery of a SGA infant, and placental abruption. Additionally, the risk of recurrent preeclampsia is higher with an IPI of < 12 months. Maternal anemia due to iron deficiency should be corrected. Additionally, the risk of uterine rupture is higher in women with a short interdelivery interval (< 18 months- birth to birth interval). Additionally,risk of TOLAC failure is higher and therefore, women should be counseled accordingly.      History of shoulder dystocia  She is aware of risk of recurrence.  Reviewed the importance of optimal glucose control to reduce the risk of recurrence.      History of preeclampsia  With her increased risk for preeclampsia, she agreed to continue asa 81 mg BID until 34 6/7 weeks, then decrease to once daily until delivery. Preeclampsia precautions reviewed.        Follow up in about 2 weeks (around 11/28/2024) for MFM Followup, TVUS.     Future Appointments   Date Time Provider Department Center   11/26/2024  2:00 PM Isma Payne MD [facility]   11/27/2024  1:15 PM Nolan Juárez MD [facility]   11/27/2024  1:15 PM ROOM 3, [facility]   4/24/2025  1:00 PM [facility]      Patient was evaluated and examined by Dr. Juárez. DONALD Salazar, helped in pre charting of part of note.    This note was created with the assistance of [employer] voice recognition software. There may be transcription errors as a result of using this technology, however minimal. Effort has been made to ensure accuracy of transcription, but any obvious errors or omissions should be clarified with the author of the document.

## 2024-11-26 ENCOUNTER — TELEPHONE (OUTPATIENT)
Dept: MATERNAL FETAL MEDICINE | Facility: CLINIC | Age: 24
End: 2024-11-26
Payer: MEDICAID

## 2024-11-27 ENCOUNTER — TELEPHONE (OUTPATIENT)
Dept: MATERNAL FETAL MEDICINE | Facility: CLINIC | Age: 24
End: 2024-11-27

## 2024-11-27 NOTE — PROGRESS NOTES
Maternal Fetal Medicine Follow Up      Subjective:     Patient ID: [id number]    Chief Complaint: [facility] follow up w/us       HPI: Norma Denise is a 24 y.o. adult  at 22w3d gestation with Estimated Date of Delivery: 25  who is here for follow-up consultation by [facility].    She has type 2 diabetes, diagnosed as a child.  She is currently on metformin 500 mg in the morning and 1000 mg in the evening as well as 12 units NPH in the morning and 12 units NPH at bedtime (instead of taking 20 units of NPH as she was previously advised on the portal).  She had hemoglobin A1c 6.0% on . She has history of shoulder dystocia in a previous pregnancy.  That child was 8 lb 4 oz at birth.  She also had preeclampsia in her previous pregnancy.  She is on low-dose aspirin twice daily.  She has history of epilepsy, diagnosed as a child.  She is on Keppra 750 mg BID and folic acid 1 mg daily.  She has not had any recent seizures.  She follows with Dr. High. She had increased BMI of 34.45 on initial MFM consult visit.  She has bipolar disorder, and she reported history of postpartum psychosis after her last delivery.  She was advised to restart a mood stabilizer under the care of mental health provider, but has not done that yet.   She reports that she follows with [facility] clinic almost daily, and is feeling well at this time.  She had  rupture of membrane at 36 weeks 3 days in her last pregnancy.  She is in cervical length surveillance.  This is a short interval pregnancy.      Interval history since last M visit: None.. She denies any leaking fluid, vaginal bleeding, contractions, decreased fetal movement. Denies headaches, visual disturbances, or epigastric pain.    Pregnancy complications include:   Patient Active Problem List   Diagnosis    Bipolar disease during pregnancy in second trimester    Pre-existing type 2 diabetes mellitus during pregnancy in second trimester    Seizure disorder during pregnancy in  "second trimester    Hx of preeclampsia, prior pregnancy, currently pregnant, second trimester    At high risk for complications of intrauterine pregnancy (IUP)    Elevated BMI affecting pregnancy in second trimester    Functional neurological symptom disorder with attacks or seizures    Localization-related (focal) (partial) idiopathic epilepsy and epileptic syndromes with seizures of localized onset, not intractable, without status epilepticus    Poor historian    Previous  delivery, antepartum    Short interval between pregnancies affecting pregnancy in second trimester, antepartum    Noncompliance       No changes to medical, surgical, family, social, or obstetric history.    Medications:  Current Outpatient Medications   Medication Instructions    acetaminophen (TYLENOL) 325 mg, Every 4 hours PRN    albuterol (PROVENTIL/VENTOLIN HFA) 90 mcg/actuation inhaler 1-2 puffs, Inhalation, Every 6 hours PRN, Rescue    aspirin (ECOTRIN) 162 mg, Oral, Daily, Start at 12 weeks gestation.  At 36 weeks gestation, decrease to one 81mg tablet daily.    blood sugar diagnostic (TRUE METRIX GLUCOSE TEST STRIP) Strp Use 1 strip to check glucose via meter four times daily    folic acid (FOLVITE) 1 mg, Oral, Daily    HumuLIN N NPH U-100 Insulin 12 Units, Subcutaneous, Nightly    insulin syringe-needle U-100 0.5 mL 31 gauge x 5/16" Syrg 1 Syringe, Misc.(Non-Drug; Combo Route), Nightly    lancets Misc To check BG 4 times daily, to use with insurance preferred meter. Check blood glucose 4 times/day: every morning before you eat (fasting) as well as 1 hour after each meal (breakfast, lunch, dinner). Record results for MD to review.    levETIRAcetam (KEPPRA) 750 mg, Oral, 2 times daily    magnesium oxide (MAG-OX) 400 mg, Oral, Daily, For the prevention of headaches    metFORMIN (GLUCOPHAGE) 500 mg, Oral, 2 times daily with meals    prenatal vit no.130-iron-folic (PRENATAL VITAMIN) 27 mg iron- 800 mcg Tab 1 tablet, Oral, Daily " "      Review of Systems   12 point review of systems conducted, negative except as stated in the history of present illness. See HPI for details.      Objective:     Visit Vitals  /71 (BP Location: Left arm, Patient Position: Sitting)   Pulse 101   Ht 5' 5" (1.651 m)   Wt 93.4 kg (206 lb)   LMP 2024 (Approximate)   BMI 34.28 kg/m²        Physical Exam  Vitals and nursing note reviewed.   Constitutional:       Appearance: Normal appearance.   HENT:      Head: Normocephalic and atraumatic.   Cardiovascular:      Rate and Rhythm: Normal rate and regular rhythm.   Pulmonary:      Effort: Pulmonary effort is normal. No respiratory distress.      Breath sounds: Normal breath sounds.   Abdominal:      Palpations: Abdomen is soft.      Tenderness: There is no abdominal tenderness.   Musculoskeletal:      Right lower leg: No edema.      Left lower leg: No edema.   Neurological:      Mental Status: Arlen is alert and oriented to person, place, and time.   Psychiatric:         Mood and Affect: Mood normal.         Behavior: Behavior normal.         Thought Content: Thought content normal.         Judgment: Judgment normal.         Assessment/Plan:     24 y.o.  female with IUP at 22w3d    Seizure disorder   There was normal fetal growth with no anomalies seen on fetal anatomy scan on 2024.  FHT present per ultrasound today, 2024     I reviewed that the risk of seizure outweigh any risk of medication and advised her to continue current medication regimen (Keppra 750 mg BID), and continue folic acid 1mg daily. In the future, recommend folic acid supplementation with 4mg PO daily for 1-3 months prior to conception and throughout first trimester.        If she has any seizures, she needs to report that immediately.  It is also recommended to avoid driving or operating heavy/hazardous equipment until 6 months post last seizure.    It is recommended to optimize antiepileptic medications by monitoring " levels under the guidance of neurology.  She was advised to maintain follow-up with neurology and continue medication as directed. Advised to keep follow-up with Dr. iHgh as planned.    Fetal surveillance as below.    Breastfeeding can be at the patient's discretion.  Most studies have found that the benefits of breast feeding outweigh the risks of medication exposure. The patient was counseled to avoid triggers (sleep deprivation) and to ensure additional supervision with feeding, changing, and bathing baby after birth. Consideration of potential interaction between her AED and desired birth control method should be reviewed in the third trimester and postpartum (by the patient's primary OB provider).        Type 2 diabetes   Risks associated with diabetes in pregnancy include higher risk for polyhydramnios, fetal macrosomia and  metabolic complications (hypoglycemia, hyperbilirubinemia, hypocalcemia, erythema).     Continue 2200 calorie ADA diet.     Log reviewed.  She was advised to continue metformin 500 mg with breakfast and 1000 mg with dinner; and I adjusted dose of insulin to  12 units of NPH in the morning, 20 units of NPH at bedtime.     Patient was advised to send her sugar log next week and follow our instructions for any adjustments.    She was advised to continue to check the glucose twice a day, alternating times, and emphasized importance of bringing log to each appointment , as log is crucial to making accurate adjustments for the blood glucose.    Low dose aspirin as discussed.  Fetal echo referral sent 2024  We will plan to do follow-up growth ultrasounds around every 4 weeks.  Fetal testing could be started in this setting around 28-30 weeks gestation.      Bipolar disorder   Previously discussed the risks of untreated psychiatric disease as well as medication specific risks in pregnancy.     Recommended restarting a mood stabilizer.  She was advised to continue close follow up with  her primary psychiatric provider throughout pregnancy and postpartum period. Advised her to report any worsening symptoms at anytime.    After delivery, she should have a follow-up appointment within 1-2 weeks with both her psychiatric provider as well as an early postpartum visit for a mood check, especially with her reported history of postpartum psychosis.  It would be reasonable to have a discussion about the benefits of breast feeding versus the potential risks of medication exposure in the breast milk.    Patient was advised to ask her mental health provider to call me during her next visit as she had benefit from getting treatment for mood stabilization, as we tried calling the clinic and we are never able to talk to a provider..      Elevated BMI   Body mass index is 34.28 kg/m². With excessive weight gain from last visit, 5 lbs in 2 weeks , she was advised to decrease caloric intake and was reminded of the importance of avoiding excessive weight gain.  Excess weight gain would be associated with gestational hypertension, worsening diabetes and adverse  outcomes, including fetal demise in utero.    Reviewed importance of FKC 3/day and prn with instructions to immediately report any decreased fetal movement.    It is important to lose weight after the pregnancy is over, especially before a future pregnancy. Breastfeeding may be an important tool in reducing the postpartum weight retention. Fetal risks were discussed with short term risk of fetal/ obesity and long term risk of adolescent component of metabolic syndrome.        Previous  delivery  Previously discussed withdrawal of 17P by FDA and new guidelines for management of previous  delivery. She is currently in cervical length surveillance.     TVUS today 2024  with normal closed cervix 3.8 cm without funneling at the IO. Cervical incompetence has been ruled out with no need for further vaginal ultrasounds at this  time. PTL precautions reviewed.      Short interval pregnancy  A short interpregnancy interval is defined as an interval between the delivery of one pregnancy and the conception of a subsequent pregnancy. Most adverse pregnancy outcomes are associated with an IPI of < 6 months. A short IPI is associated with increased risk of maternal anemia, PPROM/PTB, delivery of a SGA infant, and placental abruption. Additionally, the risk of recurrent preeclampsia is higher with an IPI of < 12 months. Maternal anemia due to iron deficiency should be corrected. Additionally, the risk of uterine rupture is higher in women with a short interdelivery interval (< 18 months- birth to birth interval). Additionally,risk of TOLAC failure is higher and therefore, women should be counseled accordingly.      History of shoulder dystocia  She is aware of risk of recurrence.  Reviewed the importance of optimal glucose control to reduce the risk of recurrence.      History of preeclampsia  With her increased risk for preeclampsia, she agreed to continue asa 81 mg BID until 34 6/7 weeks, then decrease to once daily until delivery. Preeclampsia precautions reviewed.    Follow up in about 2 weeks (around 12/16/2024) for MFM Followup, Repeat Ultrasound, to complete anatomy.     Future Appointments   Date Time Provider Department Center   12/18/2024  2:00 PM Nolan Juárez MD [facility]   12/18/2024  2:00 PM [facility]   12/31/2024  9:30 AM Isma Payne MD [facility]   4/24/2025  1:00 PM [facility]      Patient was evaluated and examined by Dr. Juárez. JAYCE Chan, helped in pre charting of part of note.    This note was created with the assistance of [employer] voice recognition software. There may be transcription errors as a result of using this technology, however minimal. Effort has been made to ensure accuracy of transcription, but any obvious errors or  omissions should be clarified with the author of the document.

## 2024-12-02 ENCOUNTER — OFFICE VISIT (OUTPATIENT)
Dept: MATERNAL FETAL MEDICINE | Facility: CLINIC | Age: 24
End: 2024-12-02
Payer: MEDICAID

## 2024-12-02 ENCOUNTER — PROCEDURE VISIT (OUTPATIENT)
Dept: MATERNAL FETAL MEDICINE | Facility: CLINIC | Age: 24
End: 2024-12-02
Payer: MEDICAID

## 2024-12-02 VITALS
WEIGHT: 206 LBS | DIASTOLIC BLOOD PRESSURE: 71 MMHG | BODY MASS INDEX: 34.32 KG/M2 | HEIGHT: 65 IN | SYSTOLIC BLOOD PRESSURE: 116 MMHG | HEART RATE: 101 BPM

## 2024-12-02 DIAGNOSIS — Z78.9 POOR HISTORIAN: ICD-10-CM

## 2024-12-02 DIAGNOSIS — O99.212 OBESITY AFFECTING PREGNANCY IN SECOND TRIMESTER, UNSPECIFIED OBESITY TYPE: ICD-10-CM

## 2024-12-02 DIAGNOSIS — O24.112 PRE-EXISTING TYPE 2 DIABETES MELLITUS DURING PREGNANCY IN SECOND TRIMESTER: ICD-10-CM

## 2024-12-02 DIAGNOSIS — O09.219 PREVIOUS PRETERM DELIVERY, ANTEPARTUM: ICD-10-CM

## 2024-12-02 DIAGNOSIS — G40.909 SEIZURE DISORDER DURING PREGNANCY IN SECOND TRIMESTER: Primary | ICD-10-CM

## 2024-12-02 DIAGNOSIS — O99.342 BIPOLAR DISEASE DURING PREGNANCY IN SECOND TRIMESTER: ICD-10-CM

## 2024-12-02 DIAGNOSIS — O09.892 SHORT INTERVAL BETWEEN PREGNANCIES AFFECTING PREGNANCY IN SECOND TRIMESTER, ANTEPARTUM: ICD-10-CM

## 2024-12-02 DIAGNOSIS — G40.909 SEIZURE DISORDER DURING PREGNANCY IN SECOND TRIMESTER: ICD-10-CM

## 2024-12-02 DIAGNOSIS — F31.9 BIPOLAR DISEASE DURING PREGNANCY IN SECOND TRIMESTER: ICD-10-CM

## 2024-12-02 DIAGNOSIS — O99.352 SEIZURE DISORDER DURING PREGNANCY IN SECOND TRIMESTER: Primary | ICD-10-CM

## 2024-12-02 DIAGNOSIS — O99.352 SEIZURE DISORDER DURING PREGNANCY IN SECOND TRIMESTER: ICD-10-CM

## 2024-12-02 DIAGNOSIS — O09.292 HX OF PREECLAMPSIA, PRIOR PREGNANCY, CURRENTLY PREGNANT, SECOND TRIMESTER: ICD-10-CM

## 2024-12-02 LAB — GLUCOSE SERPL-MCNC: 152 MG/DL (ref 70–110)

## 2024-12-02 PROCEDURE — 1159F MED LIST DOCD IN RCRD: CPT | Mod: CPTII,S$GLB,, | Performed by: OBSTETRICS & GYNECOLOGY

## 2024-12-02 PROCEDURE — 3044F HG A1C LEVEL LT 7.0%: CPT | Mod: CPTII,S$GLB,, | Performed by: OBSTETRICS & GYNECOLOGY

## 2024-12-02 PROCEDURE — 3078F DIAST BP <80 MM HG: CPT | Mod: CPTII,S$GLB,, | Performed by: OBSTETRICS & GYNECOLOGY

## 2024-12-02 PROCEDURE — 3008F BODY MASS INDEX DOCD: CPT | Mod: CPTII,S$GLB,, | Performed by: OBSTETRICS & GYNECOLOGY

## 2024-12-02 PROCEDURE — 82962 GLUCOSE BLOOD TEST: CPT | Mod: ,,, | Performed by: OBSTETRICS & GYNECOLOGY

## 2024-12-02 PROCEDURE — 3074F SYST BP LT 130 MM HG: CPT | Mod: CPTII,S$GLB,, | Performed by: OBSTETRICS & GYNECOLOGY

## 2024-12-02 PROCEDURE — 76817 TRANSVAGINAL US OBSTETRIC: CPT | Mod: S$GLB,,, | Performed by: OBSTETRICS & GYNECOLOGY

## 2024-12-02 PROCEDURE — 1160F RVW MEDS BY RX/DR IN RCRD: CPT | Mod: CPTII,S$GLB,, | Performed by: OBSTETRICS & GYNECOLOGY

## 2024-12-02 PROCEDURE — 99214 OFFICE O/P EST MOD 30 MIN: CPT | Mod: TH,S$GLB,, | Performed by: OBSTETRICS & GYNECOLOGY

## 2024-12-11 DIAGNOSIS — G40.909 SEIZURE DISORDER DURING PREGNANCY IN SECOND TRIMESTER: ICD-10-CM

## 2024-12-11 DIAGNOSIS — O09.219 PREVIOUS PRETERM DELIVERY, ANTEPARTUM: ICD-10-CM

## 2024-12-11 DIAGNOSIS — O99.352 SEIZURE DISORDER DURING PREGNANCY IN SECOND TRIMESTER: ICD-10-CM

## 2024-12-11 DIAGNOSIS — F31.9 BIPOLAR DISEASE DURING PREGNANCY IN SECOND TRIMESTER: Primary | ICD-10-CM

## 2024-12-11 DIAGNOSIS — O24.112 PRE-EXISTING TYPE 2 DIABETES MELLITUS DURING PREGNANCY IN SECOND TRIMESTER: ICD-10-CM

## 2024-12-11 DIAGNOSIS — O09.892 SHORT INTERVAL BETWEEN PREGNANCIES AFFECTING PREGNANCY IN SECOND TRIMESTER, ANTEPARTUM: ICD-10-CM

## 2024-12-11 DIAGNOSIS — O99.342 BIPOLAR DISEASE DURING PREGNANCY IN SECOND TRIMESTER: Primary | ICD-10-CM

## 2024-12-23 ENCOUNTER — TELEPHONE (OUTPATIENT)
Dept: MATERNAL FETAL MEDICINE | Facility: CLINIC | Age: 24
End: 2024-12-23
Payer: MEDICAID

## 2024-12-26 ENCOUNTER — TELEPHONE (OUTPATIENT)
Dept: MATERNAL FETAL MEDICINE | Facility: CLINIC | Age: 24
End: 2024-12-26

## 2024-12-31 ENCOUNTER — TELEPHONE (OUTPATIENT)
Dept: MATERNAL FETAL MEDICINE | Facility: CLINIC | Age: 24
End: 2024-12-31
Payer: MEDICAID

## 2024-12-31 NOTE — TELEPHONE ENCOUNTER
Discussed with dr Payne, fbs up to 150s, no log, empiric adjustment, to 16N am and 28 N hs, and adjust to 1000 met bid. Call bs on Thursday.

## 2025-01-08 NOTE — PROGRESS NOTES
Maternal Fetal Medicine Follow Up      Subjective:     Patient ID: [id number]    Chief Complaint: [facility] followup w/us      HPI: Norma Denise is a 24 y.o. adult  at 27w6d gestation with Estimated Date of Delivery: 25  who is here for follow-up consultation by [facility].    She has type 2 diabetes, diagnosed as a child.  She is currently on metformin 1000 mg twice daily and insulin: 16 units NPH in the morning and 28  units NPH at bedtime. She had hemoglobin A1c 6.0% on .  She had fetal echo appointment on 2024 that she missed.  She has history of shoulder dystocia in a previous pregnancy.  That child was 8 lb 4 oz at birth.  She also had preeclampsia in her previous pregnancy.  She is on low-dose aspirin twice daily.  She has history of epilepsy, diagnosed as a child.  She is on Keppra 750 mg BID and folic acid 1 mg daily.  She has not had any recent seizures.  She follows with Dr. High. She had increased BMI of 34.45 on initial M consult visit.  She has bipolar disorder, and she reported history of postpartum psychosis after her last delivery.  She was advised to restart a mood stabilizer under the care of mental health provider, but has not done that yet.   We reached out to her clinic and provider did not return call.  She was asked to have them call  our clinic on her follow-up visit but that has not been done yet.  she reports feeling well and following with the mental health clinic daily.  She had  rupture of membrane at 36 weeks 3 days in her last pregnancy. She had cervical insufficiency ruled out this pregnancy. This is a short interval pregnancy.  She has been noncompliant with follow-up and misses appointments frequently.  We have not seen her in [facility] office since 2024.  She also missed her appointment for her fetal echo.      Interval history since last [facility] visit: None.. She denies any leaking fluid, vaginal bleeding, contractions, decreased fetal movement. Denies  "headaches, visual disturbances, or epigastric pain.    Pregnancy complications include:   Patient Active Problem List   Diagnosis    Bipolar disease during pregnancy in second trimester    Pre-existing type 2 diabetes mellitus during pregnancy in second trimester    Seizure disorder during pregnancy in second trimester    Hx of preeclampsia, prior pregnancy, currently pregnant, second trimester    At high risk for complications of intrauterine pregnancy (IUP)    Elevated BMI affecting pregnancy in second trimester    Functional neurological symptom disorder with attacks or seizures    Localization-related (focal) (partial) idiopathic epilepsy and epileptic syndromes with seizures of localized onset, not intractable, without status epilepticus    Poor historian    Previous  delivery, antepartum    Short interval between pregnancies affecting pregnancy in second trimester, antepartum    Noncompliance       No changes to medical, surgical, family, social, or obstetric history.    Medications:  Current Outpatient Medications   Medication Instructions    acetaminophen (TYLENOL) 325 mg, Every 4 hours PRN    albuterol (PROVENTIL/VENTOLIN HFA) 90 mcg/actuation inhaler 1-2 puffs, Inhalation, Every 6 hours PRN, Rescue    aspirin (ECOTRIN) 162 mg, Oral, Daily, Start at 12 weeks gestation.  At 36 weeks gestation, decrease to one 81mg tablet daily.    blood sugar diagnostic (TRUE METRIX GLUCOSE TEST STRIP) Strp Use 1 strip to check glucose via meter four times daily    folic acid (FOLVITE) 1 mg, Oral, Daily    HumuLIN N NPH U-100 Insulin 12 Units, Subcutaneous, Nightly    insulin syringe-needle U-100 0.5 mL 31 gauge x 5/16" Syrg 1 Syringe, Misc.(Non-Drug; Combo Route), Nightly    lancets Misc To check BG 4 times daily, to use with insurance preferred meter. Check blood glucose 4 times/day: every morning before you eat (fasting) as well as 1 hour after each meal (breakfast, lunch, dinner). Record results for MD to review. " "   levETIRAcetam (KEPPRA) 750 mg, Oral, 2 times daily    magnesium oxide (MAG-OX) 400 mg, Oral, Daily, For the prevention of headaches    metFORMIN (GLUCOPHAGE) 500 mg, Oral, 2 times daily with meals    prenatal vit no.130-iron-folic (PRENATAL VITAMIN) 27 mg iron- 800 mcg Tab 1 tablet, Oral, Daily       Review of Systems   12 point review of systems conducted, negative except as stated in the history of present illness. See HPI for details.      Objective:     Visit Vitals  /76 (BP Location: Left arm, Patient Position: Sitting)   Pulse (!) 122   Ht 5' 5" (1.651 m)   Wt 94.3 kg (208 lb)   LMP 2024 (Approximate)   BMI 34.61 kg/m²        Physical Exam  Vitals and nursing note reviewed.   Constitutional:       Appearance: Normal appearance.   HENT:      Head: Normocephalic and atraumatic.   Cardiovascular:      Rate and Rhythm: Normal rate and regular rhythm.   Pulmonary:      Effort: Pulmonary effort is normal. No respiratory distress.      Breath sounds: Normal breath sounds.   Abdominal:      Palpations: Abdomen is soft.      Tenderness: There is no abdominal tenderness.   Musculoskeletal:      Right lower leg: No edema.      Left lower leg: No edema.   Neurological:      Mental Status: Arlen is alert and oriented to person, place, and time.   Psychiatric:         Mood and Affect: Mood normal.         Behavior: Behavior normal.         Thought Content: Thought content normal.         Judgment: Judgment normal.         Assessment/Plan:     24 y.o.  female with IUP at 27w6d    Seizure disorder   There is normal fetal growth with an EFW of 1169 g at the 49% and the AC at the 54% on 2025.  AFV is normal.  BPP 8/8 on 2025    I reviewed that the risk of seizure outweigh any risk of medication and advised her to continue current medication regimen (Keppra 750 mg BID), and continue folic acid 1mg daily. In the future, recommend folic acid supplementation with 4mg PO daily for 1-3 months prior to " conception and throughout first trimester.        If she has any seizures, she needs to report that immediately.  It is also recommended to avoid driving or operating heavy/hazardous equipment until 6 months post last seizure.    It is recommended to optimize antiepileptic medications by monitoring levels under the guidance of neurology.  She was advised to maintain follow-up with neurology and continue medication as directed. Advised to keep follow-up with Dr. High as planned.    Fetal surveillance as below.    Breastfeeding can be at the patient's discretion.  Most studies have found that the benefits of breast feeding outweigh the risks of medication exposure. The patient was counseled to avoid triggers (sleep deprivation) and to ensure additional supervision with feeding, changing, and bathing baby after birth. Consideration of potential interaction between her AED and desired birth control method should be reviewed in the third trimester and postpartum (by the patient's primary OB provider).        Type 2 diabetes   Risks associated with diabetes in pregnancy include higher risk for polyhydramnios, fetal macrosomia and  metabolic complications (hypoglycemia, hyperbilirubinemia, hypocalcemia, erythema).     Continue 2200 calorie ADA diet.     There was no log to review but reported sugar ranges reviewed..  The range of reported sugars is not enough to make any reliable changes.  However with the elevated blood sugar in the office at 138. 3-1/2 hour postprandials, I made empiric suggestions with the patient to Continue  Metformin 1000 mg twice daily; I adjusted the dose of insulin to 20 units of NPH in the morning, 32 units of NPH at bedtime.     She was advised to continue to check the glucose 4 times a day, and bring log to each appointment.  Emphasized importance of bringing log to each appointment as log is crucial and making optimal insulin adjustments.    Low dose aspirin as discussed.    She missed  fetal echo appointment on 2024. Our office will assist with rescheduling.  Advised of the importance of keeping that appointment.    We will plan to do follow-up growth ultrasounds around every 4 weeks.  Fetal testing could be started in this setting around 28-30 weeks gestation. Reviewed fetal kick count instructions.      Noncompliance  She was advised of the importance of continued care and follow-up as directed with each provider. Reviewed the risks of uncontrolled diabetes and her additional comorbidities in pregnancy and the importance of continued care for her pregnancy complications as above, as well as the risk of FDIU. She verbalized understanding.        Bipolar disorder   Previously discussed the risks of untreated psychiatric disease as well as medication specific risks in pregnancy.     Recommended restarting a mood stabilizer.  She was again reminded to have her mental health provider call us if assistance is needed and restarting a mood stabilizer.  She was advised to continue close follow up with her primary psychiatric provider throughout pregnancy and postpartum period. Advised her to report any worsening symptoms at anytime.    After delivery, she should have a follow-up appointment within 1-2 weeks with both her psychiatric provider as well as an early postpartum visit for a mood check, especially with her reported history of postpartum psychosis.  It would be reasonable to have a discussion about the benefits of breast feeding versus the potential risks of medication exposure in the breast milk.      Elevated BMI   Body mass index is 34.61 kg/m². With reasonable weight gain since last visit, she was advised to continue a healthy low caloric diet and diabetic diet.  Excess weight gain would be associated with gestational hypertension,  worsening diabetes and adverse  outcomes, including fetal demise in utero.    Reviewed importance of FKC 3/day and prn with instructions to  immediately report any decreased fetal movement.    It is important to lose weight after the pregnancy is over, especially before a future pregnancy. Breastfeeding may be an important tool in reducing the postpartum weight retention. Fetal risks were discussed with short term risk of fetal/ obesity and long term risk of adolescent component of metabolic syndrome.      Previous  delivery  She had cervical incompetence ruled out previously.  Expectant management. PTL precautions reviewed.      Short interval pregnancy  A short interpregnancy interval is defined as an interval between the delivery of one pregnancy and the conception of a subsequent pregnancy. Most adverse pregnancy outcomes are associated with an IPI of < 6 months. A short IPI is associated with increased risk of maternal anemia, PPROM/PTB, delivery of a SGA infant, and placental abruption. Additionally, the risk of recurrent preeclampsia is higher with an IPI of < 12 months. Maternal anemia due to iron deficiency should be corrected, if present. Additionally, the risk of uterine rupture is higher in women with a short interdelivery interval (< 18 months- birth to birth interval). Additionally,risk of TOLAC failure is higher and therefore, women should be counseled accordingly.      History of shoulder dystocia  She is aware of risk of recurrence.  Reviewed the importance of optimal glucose control to reduce the risk of recurrence.      History of preeclampsia  With her increased risk for preeclampsia, she agreed to continue asa 81 mg BID until 34 6/7 weeks, then decrease to once daily until delivery. Preeclampsia precautions reviewed.        Patient has been significantly noncompliant.  Risk of fetal demise in utero discussed empiric adjustment on dose of insulin was made.  Patient will continue aspirin b.i.d. and continue Keppra 750 b.i.d. emphasized importance of bringing sugar log with her to the office.  Patient will have weekly  testing at this time with the patient to see Dr. Payne next week we will see her back in the office in 2 weeks.    Follow up in about 2 weeks (around 1/23/2025) for MFM follow-up, BPP.     Future Appointments   Date Time Provider Department Center   1/14/2025  9:00 AM Isma Payne MD [facility] [place] [facility]   4/24/2025  1:00 PM Isma Payne MD [facility] [place] [facility] [place]      Patient was evaluated and examined by Dr. Juárez. DONALD Salazar, helped in pre charting of part of note.    This note was created with the assistance of [email or web address] voice recognition software. There may be transcription errors as a result of using this technology, however minimal. Effort has been made to ensure accuracy of transcription, but any obvious errors or omissions should be clarified with the author of the document.

## 2025-01-09 ENCOUNTER — PROCEDURE VISIT (OUTPATIENT)
Dept: MATERNAL FETAL MEDICINE | Facility: CLINIC | Age: 25
End: 2025-01-09
Payer: MEDICAID

## 2025-01-09 ENCOUNTER — OFFICE VISIT (OUTPATIENT)
Dept: MATERNAL FETAL MEDICINE | Facility: CLINIC | Age: 25
End: 2025-01-09
Payer: MEDICAID

## 2025-01-09 VITALS
DIASTOLIC BLOOD PRESSURE: 76 MMHG | HEART RATE: 122 BPM | SYSTOLIC BLOOD PRESSURE: 116 MMHG | BODY MASS INDEX: 34.66 KG/M2 | WEIGHT: 208 LBS | HEIGHT: 65 IN

## 2025-01-09 DIAGNOSIS — O99.352 SEIZURE DISORDER DURING PREGNANCY IN SECOND TRIMESTER: ICD-10-CM

## 2025-01-09 DIAGNOSIS — G40.909 SEIZURE DISORDER DURING PREGNANCY IN SECOND TRIMESTER: ICD-10-CM

## 2025-01-09 DIAGNOSIS — F31.9 BIPOLAR DISEASE DURING PREGNANCY IN SECOND TRIMESTER: ICD-10-CM

## 2025-01-09 DIAGNOSIS — O24.112 PRE-EXISTING TYPE 2 DIABETES MELLITUS DURING PREGNANCY IN SECOND TRIMESTER: ICD-10-CM

## 2025-01-09 DIAGNOSIS — O99.212 OBESITY AFFECTING PREGNANCY IN SECOND TRIMESTER, UNSPECIFIED OBESITY TYPE: ICD-10-CM

## 2025-01-09 DIAGNOSIS — O99.342 BIPOLAR DISEASE DURING PREGNANCY IN SECOND TRIMESTER: ICD-10-CM

## 2025-01-09 DIAGNOSIS — O09.892 SHORT INTERVAL BETWEEN PREGNANCIES AFFECTING PREGNANCY IN SECOND TRIMESTER, ANTEPARTUM: ICD-10-CM

## 2025-01-09 DIAGNOSIS — O09.219 PREVIOUS PRETERM DELIVERY, ANTEPARTUM: ICD-10-CM

## 2025-01-09 DIAGNOSIS — Z91.199 NONCOMPLIANCE: Primary | ICD-10-CM

## 2025-01-09 DIAGNOSIS — O09.292 HX OF PREECLAMPSIA, PRIOR PREGNANCY, CURRENTLY PREGNANT, SECOND TRIMESTER: ICD-10-CM

## 2025-01-09 LAB — GLUCOSE SERPL-MCNC: 138 MG/DL (ref 70–110)

## 2025-01-09 RX ORDER — METFORMIN HYDROCHLORIDE 1000 MG/1
1000 TABLET ORAL 2 TIMES DAILY WITH MEALS
Qty: 60 TABLET | Refills: 2 | Status: SHIPPED | OUTPATIENT
Start: 2025-01-09 | End: 2025-04-09

## 2025-01-13 DIAGNOSIS — O09.219 PREVIOUS PRETERM DELIVERY, ANTEPARTUM: ICD-10-CM

## 2025-01-13 DIAGNOSIS — G40.909 SEIZURE DISORDER DURING PREGNANCY IN SECOND TRIMESTER: ICD-10-CM

## 2025-01-13 DIAGNOSIS — O99.212 OBESITY AFFECTING PREGNANCY IN SECOND TRIMESTER, UNSPECIFIED OBESITY TYPE: Primary | ICD-10-CM

## 2025-01-13 DIAGNOSIS — O09.292 HX OF PREECLAMPSIA, PRIOR PREGNANCY, CURRENTLY PREGNANT, SECOND TRIMESTER: ICD-10-CM

## 2025-01-13 DIAGNOSIS — O99.352 SEIZURE DISORDER DURING PREGNANCY IN SECOND TRIMESTER: ICD-10-CM

## 2025-01-13 DIAGNOSIS — O24.112 PRE-EXISTING TYPE 2 DIABETES MELLITUS DURING PREGNANCY IN SECOND TRIMESTER: ICD-10-CM

## 2025-01-22 ENCOUNTER — TELEPHONE (OUTPATIENT)
Dept: MATERNAL FETAL MEDICINE | Facility: CLINIC | Age: 25
End: 2025-01-22
Payer: MEDICAID

## 2025-01-22 PROBLEM — O09.893 SHORT INTERVAL BETWEEN PREGNANCIES AFFECTING PREGNANCY IN THIRD TRIMESTER, ANTEPARTUM: Status: ACTIVE | Noted: 2024-08-29

## 2025-01-28 ENCOUNTER — HOSPITAL ENCOUNTER (EMERGENCY)
Facility: HOSPITAL | Age: 25
Discharge: HOME | End: 2025-01-28
Payer: MEDICAID

## 2025-01-28 VITALS
TEMPERATURE: 98 F | SYSTOLIC BLOOD PRESSURE: 125 MMHG | HEART RATE: 100 BPM | RESPIRATION RATE: 18 BRPM | OXYGEN SATURATION: 100 % | DIASTOLIC BLOOD PRESSURE: 70 MMHG

## 2025-01-28 DIAGNOSIS — R10.819 SUPRAPUBIC TENDERNESS: ICD-10-CM

## 2025-01-28 DIAGNOSIS — O24.913 DIABETES MELLITUS AFFECTING PREGNANCY IN THIRD TRIMESTER: ICD-10-CM

## 2025-01-28 DIAGNOSIS — Z03.71 ENCOUNTER FOR SUSPECTED PREMATURE RUPTURE OF AMNIOTIC MEMBRANES, WITH RUPTURE OF MEMBRANES NOT FOUND: Primary | ICD-10-CM

## 2025-01-28 LAB
BACTERIA #/AREA URNS AUTO: ABNORMAL /HPF
BILIRUB UR QL STRIP.AUTO: NEGATIVE
CLARITY UR: CLEAR
COLOR UR AUTO: YELLOW
CTP QC/QA: YES
GLUCOSE UR QL STRIP: ABNORMAL
HGB UR QL STRIP: NEGATIVE
KETONES UR QL STRIP: ABNORMAL
LEUKOCYTE ESTERASE UR QL STRIP: 75
NITRITE UR QL STRIP: NEGATIVE
PH UR STRIP: 6 [PH]
PROT UR QL STRIP: ABNORMAL
RBC #/AREA URNS AUTO: ABNORMAL /HPF
RUPTURE OF MEMBRANE: NEGATIVE
SP GR UR STRIP.AUTO: 1.02 (ref 1–1.03)
SQUAMOUS #/AREA URNS LPF: ABNORMAL /HPF
UROBILINOGEN UR STRIP-ACNC: 2
WBC #/AREA URNS AUTO: ABNORMAL /HPF

## 2025-01-28 PROCEDURE — 81001 URINALYSIS AUTO W/SCOPE: CPT | Performed by: OBSTETRICS & GYNECOLOGY

## 2025-01-28 PROCEDURE — 99284 EMERGENCY DEPT VISIT MOD MDM: CPT

## 2025-01-28 PROCEDURE — 84112 EVAL AMNIOTIC FLUID PROTEIN: CPT

## 2025-01-28 NOTE — PROGRESS NOTES
Maternal Fetal Medicine Follow Up      Subjective:     Patient ID: [id number]    Chief Complaint: [facility] follow up w/us       HPI: Norma Denise is a 24 y.o. adult  at 30w6d gestation with Estimated Date of Delivery: 25  who is here for follow-up consultation by MFM.    She has type 2 diabetes, diagnosed as a child.  She is supposed to be on metformin 1000 mg in the morning and 500 mg in the evening and insulin: 16 units NPH in the morning and 40 units NPH at bedtime as of 2025.  However, she states today 2025 that she is only taking the metformin with supper.  She states is taking insulin as directed.  She had hemoglobin A1c 6.0% on .  She had fetal echo on 25 that was normal. She has history of shoulder dystocia in a previous pregnancy.  That child was 8 lb 4 oz at birth.  She also had preeclampsia in her previous pregnancy.  She is on low-dose aspirin twice daily.  She has history of epilepsy, diagnosed as a child.  She is on Keppra 750 mg BID and folic acid 1 mg daily.  She has not had any recent seizures.  She follows with Dr. High. She had increased BMI of 34.45 on initial MFM consult visit.  She has bipolar disorder, and she reported history of postpartum psychosis after her last delivery.  She was advised to restart a mood stabilizer under the care of mental health provider, but has not done that yet.   We reached out to her clinic and provider did not return call.  She was asked to have them call  our clinic on her follow-up visit but that has not been done yet. She reports feeling well and following with the mental health clinic daily.  She had  rupture of membrane at 36 weeks 3 days in her last pregnancy. She had cervical insufficiency ruled out this pregnancy. This is a short interval pregnancy.  She has been intermittently non compliant with her followup visits here.       Interval history since last MFM visit: None.. She denies any leaking fluid, vaginal  "bleeding, contractions, decreased fetal movement. Denies headaches, visual disturbances, or epigastric pain.    Pregnancy complications include:   Patient Active Problem List   Diagnosis    Bipolar disease during pregnancy in third trimester    Pre-existing type 2 diabetes mellitus during pregnancy in third trimester    Seizure disorder during pregnancy in third trimester    Hx of preeclampsia, prior pregnancy, currently pregnant, second trimester    At high risk for complications of intrauterine pregnancy (IUP)    Obesity affecting pregnancy in third trimester    Functional neurological symptom disorder with attacks or seizures    Localization-related (focal) (partial) idiopathic epilepsy and epileptic syndromes with seizures of localized onset, not intractable, without status epilepticus    Poor historian    Previous  delivery, antepartum    Short interval between pregnancies affecting pregnancy in third trimester, antepartum    Noncompliance       No changes to medical, surgical, family, social, or obstetric history.    Medications:  Current Outpatient Medications   Medication Instructions    acetaminophen (TYLENOL) 325 mg, Every 4 hours PRN    albuterol (PROVENTIL/VENTOLIN HFA) 90 mcg/actuation inhaler 1-2 puffs, Inhalation, Every 6 hours PRN, Rescue    aspirin (ECOTRIN) 162 mg, Oral, Daily, Start at 12 weeks gestation.  At 36 weeks gestation, decrease to one 81mg tablet daily.    blood sugar diagnostic (TRUE METRIX GLUCOSE TEST STRIP) Strp Use 1 strip to check glucose via meter four times daily    folic acid (FOLVITE) 1 mg, Oral, Daily    HumuLIN N NPH U-100 Insulin 12 Units, Subcutaneous, Nightly    insulin syringe-needle U-100 0.5 mL 31 gauge x 5/16" Syrg 1 Syringe, Misc.(Non-Drug; Combo Route), Nightly    lancets Misc To check BG 4 times daily, to use with insurance preferred meter. Check blood glucose 4 times/day: every morning before you eat (fasting) as well as 1 hour after each meal (breakfast, " "lunch, dinner). Record results for MD to review.    levETIRAcetam (KEPPRA) 750 mg, Oral, 2 times daily    magnesium oxide (MAG-OX) 400 mg, Oral, Daily, For the prevention of headaches    metFORMIN (GLUCOPHAGE) 1,000 mg, Oral, 2 times daily with meals    prenatal vit no.130-iron-folic (PRENATAL VITAMIN) 27 mg iron- 800 mcg Tab 1 tablet, Oral, Daily       Review of Systems   12 point review of systems conducted, negative except as stated in the history of present illness. See HPI for details.      Objective:     Visit Vitals  /76 (BP Location: Left arm, Patient Position: Sitting)   Pulse (!) 117   Ht 5' 5" (1.651 m)   Wt 93.9 kg (207 lb)   LMP 2024 (Approximate)   BMI 34.45 kg/m²        Physical Exam  Vitals and nursing note reviewed.   Constitutional:       Appearance: Normal appearance.   HENT:      Head: Normocephalic and atraumatic.   Cardiovascular:      Rate and Rhythm: Normal rate and regular rhythm.   Pulmonary:      Effort: Pulmonary effort is normal. No respiratory distress.      Breath sounds: Normal breath sounds.   Abdominal:      Palpations: Abdomen is soft.      Tenderness: There is no abdominal tenderness.   Musculoskeletal:      Right lower leg: No edema.      Left lower leg: No edema.   Neurological:      Mental Status: Arlen is alert and oriented to person, place, and time.   Psychiatric:         Mood and Affect: Mood normal.         Behavior: Behavior normal.         Thought Content: Thought content normal.         Judgment: Judgment normal.         Assessment/Plan:     24 y.o.  female with IUP at 30w6d    Seizure disorder   Normal fetal growth with an EFW of 1169 g at the 49% and the AC at the 54% on 2025.  AFV is normal.  BPP 8/8 on 2025     I reviewed that the risk of seizure outweigh any risk of medication and advised her to continue current medication regimen (Keppra 750 mg BID), and continue folic acid 1mg daily. In the future, recommend folic acid supplementation " with 4mg PO daily for 1-3 months prior to conception and throughout first trimester.        If she has any seizures, she needs to report that immediately.  It is also recommended to avoid driving or operating heavy/hazardous equipment until 6 months post last seizure.    It is recommended to optimize antiepileptic medications by monitoring levels under the guidance of neurology.  She was advised to maintain follow-up with neurology and continue medication as directed. Advised to keep follow-up with Dr. High as planned.    Fetal surveillance as below.    Breastfeeding can be at the patient's discretion.  Most studies have found that the benefits of breast feeding outweigh the risks of medication exposure. The patient was counseled to avoid triggers (sleep deprivation) and to ensure additional supervision with feeding, changing, and bathing baby after birth. Consideration of potential interaction between her AED and desired birth control method should be reviewed in the third trimester and postpartum (by the patient's primary OB provider).        Type 2 diabetes   Risks associated with diabetes in pregnancy include higher risk for polyhydramnios, fetal macrosomia and  metabolic complications (hypoglycemia, hyperbilirubinemia, hypocalcemia, erythema).     Continue 2200 calorie ADA diet.     Log reviewed.  Blood sugars in the afternoon are low 1 hour after lunch per their discrepant from what the sugar was today.  Fasting blood sugars are elevated anywhere between 94 and 150.  Patient is not reliable and maybe she is eating after midnight.  Advised patient to check her sugar at 3:00 a.m. and I adjusted her treatment regimen to metformin 500 mg with supper , 14 units of NPH in the morning, and 50 units of NPH at bedtime.     She was advised to continue to check the glucose 4 times a day, and bring log to each appointment.  Emphasized importance of bringing log to each appointment as log is crucial and making  optimal insulin adjustments.    Low dose aspirin as discussed.    We will plan to do follow-up growth ultrasounds around every 4 weeks.    Fetal testing we will be done twice a week till delivery.    Reviewed fetal kick count instructions.      Noncompliance  Reviewed the importance of continued care and follow-up as directed with each provider. Reviewed the risks of uncontrolled diabetes and her additional comorbidities in pregnancy and the importance of continued care for her pregnancy complications as above, as well as the risk of FDIU.        Bipolar disorder   Previously discussed the risks of untreated psychiatric disease as well as medication specific risks in pregnancy.     Recommended restarting a mood stabilizer.  She was again reminded to have her mental health provider call us if assistance is needed and restarting a mood stabilizer.  She was advised to continue close follow up with her primary psychiatric provider throughout pregnancy and postpartum period. Advised her to report any worsening symptoms at anytime.    After delivery, she should have a follow-up appointment within 1-2 weeks with both her psychiatric provider as well as an early postpartum visit for a mood check, especially with her reported history of postpartum psychosis.  It would be reasonable to have a discussion about the benefits of breast feeding versus the potential risks of medication exposure in the breast milk.      Elevated BMI   Body mass index is 34.45 kg/m². With relatively stable weight recently, she was advised to continue a healthy low caloric diet, low-salt and diabetic diet. Excess weight gain would be associated with gestational hypertension,  worsening diabetes and adverse  outcomes, including fetal demise in utero.    Reviewed importance of FKC 3/day and prn with instructions to immediately report any decreased fetal movement.    It is important to lose weight after the pregnancy is over, especially before a  future pregnancy. Breastfeeding may be an important tool in reducing the postpartum weight retention. Fetal risks were discussed with short term risk of fetal/ obesity and long term risk of adolescent component of metabolic syndrome.      Previous  delivery  She had cervical incompetence ruled out previously.  Expectant management. PTL precautions reviewed.      Short interval pregnancy  A short interpregnancy interval is defined as an interval between the delivery of one pregnancy and the conception of a subsequent pregnancy. Most adverse pregnancy outcomes are associated with an IPI of < 6 months. A short IPI is associated with increased risk of maternal anemia, PPROM/PTB, delivery of a SGA infant, and placental abruption. Additionally, the risk of recurrent preeclampsia is higher with an IPI of < 12 months. Maternal anemia due to iron deficiency should be corrected, if present. Additionally, the risk of uterine rupture is higher in women with a short interdelivery interval (< 18 months- birth to birth interval). Additionally,risk of TOLAC failure is higher and therefore, women should be counseled accordingly.      History of shoulder dystocia  She is aware of risk of recurrence.  Reviewed the importance of optimal glucose control to reduce the risk of recurrence.      History of preeclampsia  With her increased risk for preeclampsia, she agreed to continue asa 81 mg BID until 34 6/7 weeks, then decrease to once daily until delivery. Preeclampsia precautions reviewed.      Follow up in about 1 week (around 2025) for M follow-up, BPP, Repeat ultrasound.     Future Appointments   Date Time Provider Department Center   2/3/2025  2:00 PM Isma Payne MD [facility]   2025  2:30 PM Nolan Juárez MD [facility]   2025  2:30 PM [address] [facility]   2/10/2025  1:00 PM Isma Payne MD [facility]   2025  1:00 PM Isma Payne  MD [facility]   2/24/2025  1:45 PM Ramona Dhillon NP [facility]   3/3/2025  1:00 PM VoltIsma hughes MD [facility]   3/10/2025  1:00 PM VoltIsma hughes MD [facility]   3/17/2025  1:00 PM VoltIsma hughes MD [facility]   3/24/2025  1:00 PM VoltIsma hughes MD [facility]   3/31/2025  1:00 PM VoltIsma hughes MD [facility]   4/24/2025  1:00 PM VoltIsma hughes MD [facility]      Patient was evaluated and examined by Dr. Juárez. DONALD Salazar, helped in pre charting of part of note.    This note was created with the assistance of [employer] voice recognition software. There may be transcription errors as a result of using this technology, however minimal. Effort has been made to ensure accuracy of transcription, but any obvious errors or omissions should be clarified with the author of the document.

## 2025-01-29 NOTE — ED PROVIDER NOTES
MARIO NOTE     Admit Date: 2025  MARIO Physician: Helio Gomez  Primary OBGYN:Dr. Isma Payne    Admit Diagnosis/Chief Complaint: Leakage of Fluid      Chief Complaint   Patient presents with    ruptured membranes       HPI:  Norma Denise is a 24 y.o.  at 30w4d presents complaining of possible leakage of fluid starting around 730 p.m..  She had gush of fluid while in the bed.  Unsure if she urinated on herself.  Got up to go pee in had some more fluid come out but no further since she has been here.  Denies any vaginal bleeding.  Denies any vaginal discharge or itching.  She does have some discomfort just above the symphysis pubis in the midline.      Patient states has been complicated by twelve year history of type 2 diabetes, obesity and history of prior  section 10 months ago in this pregnancy.     PMHx:   Past Medical History:   Diagnosis Date    Adjustment disorder     Anemia     BEGAN WITH PREGNANCY OF LAST BABY, CURRENT    Anxiety     Asthma     CURRENT    Bipolar disorder 2008    Depression 2008    Diabetes mellitus 2023    TYPE 2 DIABETIC    Excessive fetal growth affecting management of pregnancy in third trimester 2024    History of psychiatric hospitalization 2008     of psychiatric care     Hypertension     ECLAMPSIA WITH LABOR    Postpartum depression     Psychiatric exam requested by authority 2008    Psychiatric problem 2008    Seizure disorder     DIAGNOSED WHEN BORN    Sleep difficulties     Substance abuse     BEEN SOBER FOR 2 YEARS IN FEBRUARY    Suicide attempt     HAPPENS OFTEN, LAST ATTEMPT WAS        PSHx:   Past Surgical History:   Procedure Laterality Date     SECTION N/A 2024    Procedure:  SECTION;  Surgeon: Isma Payne MD;  [facility]&  Service: OB/GYN;  Laterality: N/A;       All: Review of patient's allergies indicates:  No Known Allergies    Meds: No current  "facility-administered medications for this encounter.    Current Outpatient Medications:     aspirin (ECOTRIN) 81 MG EC tablet, Take 2 tablets (162 mg total) by mouth once daily. Start at 12 weeks gestation.  At 36 weeks gestation, decrease to one 81mg tablet daily., Disp: 60 tablet, Rfl: 6    blood sugar diagnostic (TRUE METRIX GLUCOSE TEST STRIP) Strp, Use 1 strip to check glucose via meter four times daily, Disp: 120 each, Rfl: 11    folic acid (FOLVITE) 1 MG tablet, Take 1 tablet (1 mg total) by mouth once daily., Disp: 30 tablet, Rfl: 3    insulin NPH (HUMULIN N NPH U-100 INSULIN) 100 unit/mL injection, Inject 12 Units into the skin every evening., Disp: 20 mL, Rfl: 3    insulin syringe-needle U-100 0.5 mL 31 gauge x 5/16" Syrg, 1 Syringe by Misc.(Non-Drug; Combo Route) route every evening., Disp: 100 each, Rfl: 2    lancets Misc, To check BG 4 times daily, to use with insurance preferred meter. Check blood glucose 4 times/day: every morning before you eat (fasting) as well as 1 hour after each meal (breakfast, lunch, dinner). Record results for MD to review., Disp: 100 each, Rfl: 2    magnesium oxide (MAG-OX) 400 mg (241.3 mg magnesium) tablet, Take 1 tablet (400 mg total) by mouth once daily. For the prevention of headaches, Disp: 30 tablet, Rfl: 3    metFORMIN (GLUCOPHAGE) 1000 MG tablet, Take 1 tablet (1,000 mg total) by mouth 2 (two) times daily with meals., Disp: 60 tablet, Rfl: 2    prenatal vit no.130-iron-folic (PRENATAL VITAMIN) 27 mg iron- 800 mcg Tab, Take 1 tablet by mouth Daily., Disp: 90 tablet, Rfl: 3    acetaminophen (TYLENOL) suppository, Place 325 mg rectally every 4 (four) hours as needed for Temperature greater than., Disp: , Rfl:     albuterol (PROVENTIL/VENTOLIN HFA) 90 mcg/actuation inhaler, Inhale 1-2 puffs into the lungs every 6 (six) hours as needed for Wheezing. Rescue, Disp: 6.7 g, Rfl: 3    levETIRAcetam (KEPPRA) 750 MG Tab, Take 1 tablet (750 mg total) by mouth 2 (two) times " daily., Disp: 60 tablet, Rfl: 3    SH:   Social History     Socioeconomic History    Marital status:     Number of children: 0   Occupational History    Occupation: unemployed   Tobacco Use    Smoking status: Former     Types: Vaping with nicotine, Cigarettes     Start date: 2/5/2020     Quit date: 2022     Years since quitting: 3.0     Passive exposure: Never    Smokeless tobacco: Never   Substance and Sexual Activity    Alcohol use: Never    Drug use: Not Currently     Frequency: 21.0 times per week     Types: Marijuana, Heroin    Sexual activity: Yes     Partners: Male     Birth control/protection: None   Other Topics Concern    Patient feels they ought to cut down on drinking/drug use No    Patient annoyed by others criticizing their drinking/drug use No    Patient has felt bad or guilty about drinking/drug use No    Patient has had a drink/used drugs as an eye opener in the AM No   Social History Narrative    ** Merged History Encounter **         Pt is a 21 year old female who recently broke up with her  Boyfriend, whom she had been living with.   Pt reports that she does not attend school nor does she have a job. Pt states that she completed 11 th grade.     Social Drivers of Health     Financial Resource Strain: Medium Risk (8/1/2022)    Received from [facility] and Its Subsidiaries and Affiliates, [facility] and Its Subsidiaries and Affiliates    Overall Financial Resource Strain (CARDIA)     Difficulty of Paying Living Expenses: Somewhat hard   Physical Activity: Sufficiently Active (8/1/2022)    Received from [facility] and Its Subsidiaries and Affiliates, [facility] and Its Subsidiaries and Affiliates    Exercise Vital Sign     Days of Exercise per Week: 7 days     Minutes of Exercise per Session: 30 min   Stress: Stress Concern Present  (2022)    Received from [facility] and Its Subsidiaries and Affiliates, [facility] and Its Subsidiaries and Affiliates    [facility] [place] [facility] - Occupational Stress Questionnaire     Feeling of Stress : Very much   Housing Stability: Low Risk  (2022)    Received from [facility] and Its Subsidiaries and Affiliates, [facility] and Its Subsidiaries and Affiliates    Housing Stability Vital Sign     Unable to Pay for Housing in the Last Year: No     Number of Places Lived in the Last Year: 1     Unstable Housing in the Last Year: No       FH:   Family History   Problem Relation Name Age of Onset    No Known Problems Paternal Grandfather      No Known Problems Paternal Grandmother      No Known Problems Maternal Grandmother      No Known Problems Maternal Grandfather      Stroke Father      Diabetes Mother      Bipolar disorder Mother      Schizophrenia Mother      No Known Problems Brother      Bipolar disorder Sister      Schizophrenia Sister      No Known Problems Maternal Aunt      No Known Problems Maternal Uncle      No Known Problems Paternal Aunt      No Known Problems Paternal Uncle      No Known Problems Cousin         OBHx:   OB History    Para Term  AB Living   4 2 1 1 1 2   SAB IAB Ectopic Multiple Live Births   1 0 0 0 2      # Outcome Date GA Lbr Fantasma/2nd Weight Sex Type Anes PTL Lv   4 Current            3  24 36w3d  4.14 kg (9 lb 2 oz) M CS-LTranv Gen Y CRESENCIO      Complications: Fetal Intolerance      Name: CODY SEVERINO      Apgar1: 8  Apgar5: 9   2 Term 2022 38w0d  3.742 kg (8 lb 4 oz) M Vag-Spont EPI N CRESENCIO   1 SAB 21     SAB   FD       Patient denies vaginal bleeding, contractions, headache, vision changes, RUQ pain, dysuria, fever, and nausea/vomiting.  Fetal Movement: normal.    BP  125/70   Pulse 100   Temp 98.4 °F (36.9 °C)   Resp 18   LMP 06/24/2024 (Approximate)   SpO2 100%   Temp:  [98 °F (36.7 °C)-98.4 °F (36.9 °C)] 98.4 °F (36.9 °C)  Pulse:  [100-129] 100  Resp:  [18] 18  SpO2:  [97 %-100 %] 100 %  BP: (116-125)/(62-70) 125/70    General: in no apparent distress well developed and well nourished  Cardiovascular: Regular rate and rhythm  Lungs: Clear to auscultation bilaterally  Abdominal: soft, nontender, nondistended, no abnormal masses, no epigastric pain obese fundus soft, nontender consistent with 31-32 weeks size FHT present.  Mild suprapubic tenderness in the midline with deep palpation.  Back: lumbar tenderness absent   CVA tenderness none  Extremeties no redness or tenderness in the calves or thighs trace pitting edema FDC up the legs bilaterally, DTRs +1 over 4 bilaterally at the knees and no evidence of ankle clonus    SSE:  Deferred  SVE:  Deferred    FHT:  140's, Reactive, Category 1, and Reassuring for gestational age  TOCO: Contractions none    Prenatal labs:  B, Rh positive, Rubella immune, Hepatitis-B surface antigen negative, HIV negative, and RPR negative    LABS:     Recent Results (from the past 24 hours)   POCT Rupture of membrane    Collection Time: 01/28/25  8:50 PM   Result Value Ref Range    Rupture of Membrane Negative Negative     Acceptable Yes    Urinalysis, Reflex to Urine Culture    Collection Time: 01/28/25  9:16 PM    Specimen: Urine   Result Value Ref Range    Color, UA Yellow Yellow, Light-Yellow, Colorless, Straw, Dark-Yellow    Appearance, UA Clear Clear    Specific Gravity, UA 1.020 1.005 - 1.030    pH, UA 6.0 5.0 - 8.5    Protein, UA 2+ (A) Negative    Glucose, UA 1+ (A) Negative, Normal    Ketones, UA Trace (A) Negative    Blood, UA Negative Negative    Bilirubin, UA Negative Negative    Urobilinogen, UA 2.0 (A) 0.2, 1.0, Normal    Nitrites, UA Negative Negative    Leukocyte Esterase, UA 75 (A) Negative    RBC, UA 0-5 None  Seen, 0-2, 3-5, 0-5 /HPF    WBC, UA 0-5 None Seen, 0-2, 3-5, 0-5 /HPF    Bacteria, UA Trace None Seen, Trace /HPF    Squamous Epithelial Cells, UA Occasional (A) None Seen, Trace, Rare /HPF         Imaging Results    None          ASSESMENT: Norma Denise is a 24 y.o.   at 30w4d with suspected rupture of membranes that are not found per ROM testing.  Urinalysis noted no evidence of infection.  Did have 2+ proteinuria which is consistent with her long term diabetes which has not been well-controlled.  Observation in MARIO showed no signs of contractions and category 1 tracing.            Discharge Diagnosis/Clinical Impression:  :  Encounter for suspected premature rupture of amniotic membranes, with rupture of membranes not found (Primary)  Diabetes mellitus affecting pregnancy in third trimester  Suprapubic tenderness    Status:Stable    Labor precautions reviewed with patient  ER precautions reviewed with patient  Patient was given an opportunity to ask questions      Patient Instructions:       - Pt was given routine pregnancy instructions including to return to triage if she had any vaginal bleeding (other than spotting for the next 48hrs), any loss of fluid like her water broke, decreased fetal movement, or contractions Q 5min lasting for 2 or more hours. Pt was also instructed to drink copious water. Patient voiced understanding of all these instructions and was subsequently discharged home.  Please see specific patient discharge instructions for her current diagnosis.    She will follow up with her primary OB next Monday as already scheduled and she will see the high-risk MFM on Thursday.  She understands how important it is to make both of these appointments.  She is to follow her diabetic diet and eat the meals as prescribed by the MFM.      This note was created with the assistance of CureLauncher voice recognition software. There may be transcription errors as a result of using this technology  however minimal. Effort has been made to assure accuracy of transcription but any obvious errors or omissions should be clarified with the author of the document.

## 2025-01-30 ENCOUNTER — OFFICE VISIT (OUTPATIENT)
Dept: MATERNAL FETAL MEDICINE | Facility: CLINIC | Age: 25
End: 2025-01-30
Payer: MEDICAID

## 2025-01-30 ENCOUNTER — PROCEDURE VISIT (OUTPATIENT)
Dept: MATERNAL FETAL MEDICINE | Facility: CLINIC | Age: 25
End: 2025-01-30
Payer: MEDICAID

## 2025-01-30 VITALS
SYSTOLIC BLOOD PRESSURE: 116 MMHG | BODY MASS INDEX: 34.49 KG/M2 | HEIGHT: 65 IN | WEIGHT: 207 LBS | DIASTOLIC BLOOD PRESSURE: 76 MMHG | HEART RATE: 117 BPM

## 2025-01-30 DIAGNOSIS — G40.909 SEIZURE DISORDER DURING PREGNANCY IN THIRD TRIMESTER: Primary | ICD-10-CM

## 2025-01-30 DIAGNOSIS — O24.113 PRE-EXISTING TYPE 2 DIABETES MELLITUS DURING PREGNANCY IN THIRD TRIMESTER: ICD-10-CM

## 2025-01-30 DIAGNOSIS — O99.343 BIPOLAR DISEASE DURING PREGNANCY IN THIRD TRIMESTER: ICD-10-CM

## 2025-01-30 DIAGNOSIS — F31.9 BIPOLAR DISEASE DURING PREGNANCY IN THIRD TRIMESTER: ICD-10-CM

## 2025-01-30 DIAGNOSIS — G40.909 SEIZURE DISORDER DURING PREGNANCY IN SECOND TRIMESTER: ICD-10-CM

## 2025-01-30 DIAGNOSIS — O99.353 SEIZURE DISORDER DURING PREGNANCY IN THIRD TRIMESTER: Primary | ICD-10-CM

## 2025-01-30 DIAGNOSIS — O99.213 OBESITY AFFECTING PREGNANCY IN THIRD TRIMESTER, UNSPECIFIED OBESITY TYPE: ICD-10-CM

## 2025-01-30 DIAGNOSIS — Z91.199 NONCOMPLIANCE: ICD-10-CM

## 2025-01-30 DIAGNOSIS — O09.219 PREVIOUS PRETERM DELIVERY, ANTEPARTUM: ICD-10-CM

## 2025-01-30 DIAGNOSIS — O09.292 HX OF PREECLAMPSIA, PRIOR PREGNANCY, CURRENTLY PREGNANT, SECOND TRIMESTER: ICD-10-CM

## 2025-01-30 DIAGNOSIS — O99.212 OBESITY AFFECTING PREGNANCY IN SECOND TRIMESTER, UNSPECIFIED OBESITY TYPE: ICD-10-CM

## 2025-01-30 DIAGNOSIS — O09.893 SHORT INTERVAL BETWEEN PREGNANCIES AFFECTING PREGNANCY IN THIRD TRIMESTER, ANTEPARTUM: ICD-10-CM

## 2025-01-30 DIAGNOSIS — Z36.89 ENCOUNTER FOR ULTRASOUND TO ASSESS FETAL GROWTH: ICD-10-CM

## 2025-01-30 DIAGNOSIS — O99.352 SEIZURE DISORDER DURING PREGNANCY IN SECOND TRIMESTER: ICD-10-CM

## 2025-01-30 DIAGNOSIS — O24.112 PRE-EXISTING TYPE 2 DIABETES MELLITUS DURING PREGNANCY IN SECOND TRIMESTER: ICD-10-CM

## 2025-01-30 LAB — GLUCOSE SERPL-MCNC: 167 MG/DL (ref 70–110)

## 2025-01-30 PROCEDURE — 3078F DIAST BP <80 MM HG: CPT | Mod: CPTII,S$GLB,, | Performed by: OBSTETRICS & GYNECOLOGY

## 2025-01-30 PROCEDURE — 76819 FETAL BIOPHYS PROFIL W/O NST: CPT | Mod: S$GLB,,, | Performed by: OBSTETRICS & GYNECOLOGY

## 2025-01-30 PROCEDURE — 99214 OFFICE O/P EST MOD 30 MIN: CPT | Mod: TH,S$GLB,, | Performed by: OBSTETRICS & GYNECOLOGY

## 2025-01-30 PROCEDURE — 82962 GLUCOSE BLOOD TEST: CPT | Mod: ,,, | Performed by: OBSTETRICS & GYNECOLOGY

## 2025-01-30 PROCEDURE — 3008F BODY MASS INDEX DOCD: CPT | Mod: CPTII,S$GLB,, | Performed by: OBSTETRICS & GYNECOLOGY

## 2025-01-30 PROCEDURE — 1159F MED LIST DOCD IN RCRD: CPT | Mod: CPTII,S$GLB,, | Performed by: OBSTETRICS & GYNECOLOGY

## 2025-01-30 PROCEDURE — 1160F RVW MEDS BY RX/DR IN RCRD: CPT | Mod: CPTII,S$GLB,, | Performed by: OBSTETRICS & GYNECOLOGY

## 2025-01-30 PROCEDURE — 3074F SYST BP LT 130 MM HG: CPT | Mod: CPTII,S$GLB,, | Performed by: OBSTETRICS & GYNECOLOGY

## 2025-02-04 NOTE — PROGRESS NOTES
Maternal Fetal Medicine Follow Up      Subjective:     Patient ID: [id number]    Chief Complaint: mfm followup w/us      HPI: Norma Denise is a 25 y.o. adult  at 31w6d gestation with Estimated Date of Delivery: 25  who is here for follow-up consultation by M.    She has type 2 diabetes, diagnosed as a child.  She is supposed to be on metformin 1000 mg in the morning and 500 mg in the evening and insulin: 14 units NPH in the morning and 50 units NPH at bedtime as of 2025.  However, she states today 2025 that she is only taking the metformin with supper.  She states is taking insulin as directed.  She had hemoglobin A1c 6.0% on .  She had fetal echo on 25 that was normal. She has history of shoulder dystocia in a previous pregnancy.  That child was 8 lb 4 oz at birth.  She also had preeclampsia in her previous pregnancy.  She is on low-dose aspirin twice daily.  She has history of epilepsy, diagnosed as a child.  She is on Keppra 750 mg BID and folic acid 1 mg daily.  She has not had any recent seizures.  She follows with Dr. High. She had increased BMI of 34.45 on initial MFM consult visit.  She has bipolar disorder, and she reported history of postpartum psychosis after her last delivery.  She was advised to restart a mood stabilizer under the care of mental health provider, but has not done that yet.   We reached out to her clinic and provider did not return call.  She was asked to have them call  our clinic on her follow-up visit but that has not been done yet. She reports feeling well and following with the mental health clinic daily.  She had  rupture of membrane at 36 weeks 3 days in her last pregnancy. She had cervical insufficiency ruled out this pregnancy. This is a short interval pregnancy.  She has been intermittently non compliant with her followup visits here.       Interval history since last MFM visit: None.. She denies any leaking fluid, vaginal bleeding,  "contractions, decreased fetal movement. Denies headaches, visual disturbances, or epigastric pain.    Pregnancy complications include:   Patient Active Problem List   Diagnosis    Bipolar disease during pregnancy in third trimester    Pre-existing type 2 diabetes mellitus during pregnancy in third trimester    Seizure disorder during pregnancy in third trimester    Hx of preeclampsia, prior pregnancy, currently pregnant, second trimester    At high risk for complications of intrauterine pregnancy (IUP)    Obesity affecting pregnancy in third trimester    Functional neurological symptom disorder with attacks or seizures    Localization-related (focal) (partial) idiopathic epilepsy and epileptic syndromes with seizures of localized onset, not intractable, without status epilepticus    Poor historian    Previous  delivery, antepartum    Short interval between pregnancies affecting pregnancy in third trimester, antepartum    Noncompliance       No changes to medical, surgical, family, social, or obstetric history.    Medications:  Current Outpatient Medications   Medication Instructions    acetaminophen (TYLENOL) 325 mg, Every 4 hours PRN    albuterol (PROVENTIL/VENTOLIN HFA) 90 mcg/actuation inhaler 1-2 puffs, Inhalation, Every 6 hours PRN, Rescue    aspirin (ECOTRIN) 162 mg, Oral, Daily, Start at 12 weeks gestation.  At 36 weeks gestation, decrease to one 81mg tablet daily.    blood sugar diagnostic (TRUE METRIX GLUCOSE TEST STRIP) Strp Use 1 strip to check glucose via meter four times daily    folic acid (FOLVITE) 1 mg, Oral, Daily    HumuLIN N NPH U-100 Insulin 12 Units, Subcutaneous, Nightly    insulin syringe-needle U-100 0.5 mL 31 gauge x 5/16" Syrg 1 Syringe, Misc.(Non-Drug; Combo Route), Nightly    lancets Misc To check BG 4 times daily, to use with insurance preferred meter. Check blood glucose 4 times/day: every morning before you eat (fasting) as well as 1 hour after each meal (breakfast, lunch, " "dinner). Record results for MD to review.    levETIRAcetam (KEPPRA) 750 mg, Oral, 2 times daily    magnesium oxide (MAG-OX) 400 mg, Oral, Daily, For the prevention of headaches    metFORMIN (GLUCOPHAGE) 1,000 mg, Oral, 2 times daily with meals    prenatal vit no.130-iron-folic (PRENATAL VITAMIN) 27 mg iron- 800 mcg Tab 1 tablet, Oral, Daily       Review of Systems   12 point review of systems conducted, negative except as stated in the history of present illness. See HPI for details.      Objective:     Visit Vitals  /78 (BP Location: Left arm, Patient Position: Sitting)   Pulse 107   Ht 5' 5" (1.651 m)   Wt 95.7 kg (211 lb)   LMP 2024 (Approximate)   BMI 35.11 kg/m²        Physical Exam  Vitals and nursing note reviewed.   Constitutional:       Appearance: Normal appearance.   HENT:      Head: Normocephalic and atraumatic.   Cardiovascular:      Rate and Rhythm: Normal rate and regular rhythm.   Pulmonary:      Effort: Pulmonary effort is normal. No respiratory distress.      Breath sounds: Normal breath sounds.   Abdominal:      Palpations: Abdomen is soft.      Tenderness: There is no abdominal tenderness.   Musculoskeletal:      Right lower leg: No edema.      Left lower leg: No edema.   Neurological:      Mental Status: Arlen is alert and oriented to person, place, and time.   Psychiatric:         Mood and Affect: Mood normal.         Behavior: Behavior normal.         Thought Content: Thought content normal.         Judgment: Judgment normal.         Assessment/Plan:     25 y.o.  female with IUP at 31w6d    Seizure disorder   There is  trend for excessive fetal growth with an EFW of 2214 g at the 53% and the AC at the 98% on 25  AFV is normal.  BPP 8/8 on 2025     I reviewed that the risk of seizure outweigh any risk of medication and advised her to continue current medication regimen (Keppra 750 mg BID), and continue folic acid 1mg daily. In the future, recommend folic acid " supplementation with 4mg PO daily for 1-3 months prior to conception and throughout first trimester.        If she has any seizures, she needs to report that immediately.  It is also recommended to avoid driving or operating heavy/hazardous equipment until 6 months post last seizure.    It is recommended to optimize antiepileptic medications by monitoring levels under the guidance of neurology.  She was advised to maintain follow-up with neurology and continue medication as directed. Advised to keep follow-up with Dr. High as planned.    Fetal surveillance as below.    Breastfeeding can be at the patient's discretion.  Most studies have found that the benefits of breast feeding outweigh the risks of medication exposure. The patient was counseled to avoid triggers (sleep deprivation) and to ensure additional supervision with feeding, changing, and bathing baby after birth. Consideration of potential interaction between her AED and desired birth control method should be reviewed in the third trimester and postpartum (by the patient's primary OB provider).        Type 2 diabetes   Risks associated with diabetes in pregnancy include higher risk for polyhydramnios, fetal macrosomia and  metabolic complications (hypoglycemia, hyperbilirubinemia, hypocalcemia, erythema).     Continue 2200 calorie ADA diet.     Log reviewed.  Fasting blood sugars are still elevated.  Patient checked her sugar at 3:00 a.m. 1 time and was 91.  Most postprandial blood sugars are normal except for couple of readings at 183 related to diet  .  Patient was advised to adjust her treatment to  metformin 500 mg with supper , 14 units of NPH in the morning, and 64 units of NPH at bedtime.  We will also have patient take 1 unit of Humalog for every 8 mg of sugar over 140.     She was advised to continue to check the glucose 4 times a day, and bring log to each appointment.  Emphasized importance of bringing log to each appointment as log is  crucial and making optimal insulin adjustments.    Low dose aspirin as discussed.    We will plan to do follow-up growth ultrasounds around every 4 weeks.    Fetal testing we will be done twice a week till delivery.    Reviewed fetal kick count instructions.      Noncompliance  Reviewed the importance of continued care and follow-up as directed with each provider. Reviewed the risks of uncontrolled diabetes and her additional comorbidities in pregnancy and the importance of continued care for her pregnancy complications as above, as well as the risk of FDIU.        Bipolar disorder   Previously discussed the risks of untreated psychiatric disease as well as medication specific risks in pregnancy.     Recommended restarting a mood stabilizer.  She was again reminded to have her mental health provider call us if assistance is needed and restarting a mood stabilizer.  She was advised to continue close follow up with her primary psychiatric provider throughout pregnancy and postpartum period. Advised her to report any worsening symptoms at anytime.    After delivery, she should have a follow-up appointment within 1-2 weeks with both her psychiatric provider as well as an early postpartum visit for a mood check, especially with her reported history of postpartum psychosis.  It would be reasonable to have a discussion about the benefits of breast feeding versus the potential risks of medication exposure in the breast milk.      Elevated BMI   Body mass index is 35.11 kg/m². With relatively stable weight recently, she was advised to continue a healthy low caloric diet and diabetic diet.  Excess weight gain would be associated with gestational hypertension,  worsening diabetes and adverse  outcomes, including fetal demise in utero.    Reviewed importance of FKC 3/day and prn with instructions to immediately report any decreased fetal movement.    It is important to lose weight after the pregnancy is over,  especially before a future pregnancy. Breastfeeding may be an important tool in reducing the postpartum weight retention. Fetal risks were discussed with short term risk of fetal/ obesity and long term risk of adolescent component of metabolic syndrome.      Previous  delivery  She had cervical incompetence ruled out previously.  Expectant management. PTL precautions reviewed.      Short interval pregnancy  A short interpregnancy interval is defined as an interval between the delivery of one pregnancy and the conception of a subsequent pregnancy. Most adverse pregnancy outcomes are associated with an IPI of < 6 months. A short IPI is associated with increased risk of maternal anemia, PPROM/PTB, delivery of a SGA infant, and placental abruption. Additionally, the risk of recurrent preeclampsia is higher with an IPI of < 12 months. Maternal anemia due to iron deficiency should be corrected, if present. Additionally, the risk of uterine rupture is higher in women with a short interdelivery interval (< 18 months- birth to birth interval). Additionally,risk of TOLAC failure is higher and therefore, women should be counseled accordingly.      History of shoulder dystocia  She is aware of risk of recurrence.  Reviewed the importance of optimal glucose control to reduce the risk of recurrence.      History of preeclampsia  With her increased risk for preeclampsia, she agreed to continue asa 81 mg BID until 34 6/7 weeks, then decrease to once daily until delivery. Preeclampsia precautions reviewed.      No follow-ups on file.     Future Appointments   Date Time Provider Department Center   2/10/2025  1:00 PM Isma Payne MD [facility]   2025  1:00 PM Isma Payne MD [facility]   2025  1:00 PM Isma Payne MD [facility]   3/3/2025  1:00 PM Isma Payne MD [facility]   3/10/2025  1:00 PM Isma Payne MD [facility]    3/17/2025  1:00 PM [facility]   4/24/2025  1:00 PM Isma Payne MD [facility]      Patient was evaluated and examined by Dr. Juárez. DONALD Salazar, helped in pre charting of part of note.    This note was created with the assistance of [employer] voice recognition software. There may be transcription errors as a result of using this technology, however minimal. Effort has been made to ensure accuracy of transcription, but any obvious errors or omissions should be clarified with the author of the document.

## 2025-02-06 ENCOUNTER — PROCEDURE VISIT (OUTPATIENT)
Dept: MATERNAL FETAL MEDICINE | Facility: CLINIC | Age: 25
End: 2025-02-06
Payer: MEDICAID

## 2025-02-06 ENCOUNTER — OFFICE VISIT (OUTPATIENT)
Dept: MATERNAL FETAL MEDICINE | Facility: CLINIC | Age: 25
End: 2025-02-06
Payer: MEDICAID

## 2025-02-06 VITALS
SYSTOLIC BLOOD PRESSURE: 115 MMHG | DIASTOLIC BLOOD PRESSURE: 78 MMHG | WEIGHT: 211 LBS | BODY MASS INDEX: 35.16 KG/M2 | HEART RATE: 107 BPM | HEIGHT: 65 IN

## 2025-02-06 DIAGNOSIS — O24.113 PRE-EXISTING TYPE 2 DIABETES MELLITUS DURING PREGNANCY IN THIRD TRIMESTER: Primary | ICD-10-CM

## 2025-02-06 DIAGNOSIS — O99.353 SEIZURE DISORDER DURING PREGNANCY IN THIRD TRIMESTER: ICD-10-CM

## 2025-02-06 DIAGNOSIS — G40.909 SEIZURE DISORDER DURING PREGNANCY IN THIRD TRIMESTER: ICD-10-CM

## 2025-02-06 DIAGNOSIS — F31.9 BIPOLAR DISEASE DURING PREGNANCY IN THIRD TRIMESTER: ICD-10-CM

## 2025-02-06 DIAGNOSIS — O99.343 BIPOLAR DISEASE DURING PREGNANCY IN THIRD TRIMESTER: ICD-10-CM

## 2025-02-06 DIAGNOSIS — O24.113 PRE-EXISTING TYPE 2 DIABETES MELLITUS DURING PREGNANCY IN THIRD TRIMESTER: ICD-10-CM

## 2025-02-06 DIAGNOSIS — Z36.89 ENCOUNTER FOR ULTRASOUND TO ASSESS FETAL GROWTH: ICD-10-CM

## 2025-02-06 LAB — GLUCOSE SERPL-MCNC: 172 MG/DL (ref 70–110)

## 2025-02-06 PROCEDURE — 76819 FETAL BIOPHYS PROFIL W/O NST: CPT | Mod: S$GLB,,, | Performed by: OBSTETRICS & GYNECOLOGY

## 2025-02-06 PROCEDURE — 3008F BODY MASS INDEX DOCD: CPT | Mod: CPTII,S$GLB,, | Performed by: OBSTETRICS & GYNECOLOGY

## 2025-02-06 PROCEDURE — 1159F MED LIST DOCD IN RCRD: CPT | Mod: CPTII,S$GLB,, | Performed by: OBSTETRICS & GYNECOLOGY

## 2025-02-06 PROCEDURE — 76816 OB US FOLLOW-UP PER FETUS: CPT | Mod: S$GLB,,, | Performed by: OBSTETRICS & GYNECOLOGY

## 2025-02-06 PROCEDURE — 3074F SYST BP LT 130 MM HG: CPT | Mod: CPTII,S$GLB,, | Performed by: OBSTETRICS & GYNECOLOGY

## 2025-02-06 PROCEDURE — 82962 GLUCOSE BLOOD TEST: CPT | Mod: ,,, | Performed by: OBSTETRICS & GYNECOLOGY

## 2025-02-06 PROCEDURE — 3078F DIAST BP <80 MM HG: CPT | Mod: CPTII,S$GLB,, | Performed by: OBSTETRICS & GYNECOLOGY

## 2025-02-06 PROCEDURE — 99214 OFFICE O/P EST MOD 30 MIN: CPT | Mod: TH,S$GLB,, | Performed by: OBSTETRICS & GYNECOLOGY

## 2025-02-06 PROCEDURE — 1160F RVW MEDS BY RX/DR IN RCRD: CPT | Mod: CPTII,S$GLB,, | Performed by: OBSTETRICS & GYNECOLOGY

## 2025-02-06 RX ORDER — INSULIN LISPRO 100 [IU]/ML
INJECTION, SOLUTION INTRAVENOUS; SUBCUTANEOUS
Qty: 10 ML | Refills: 1 | Status: SHIPPED | OUTPATIENT
Start: 2025-02-06

## 2025-02-11 NOTE — PROGRESS NOTES
Maternal Fetal Medicine Follow Up      Subjective:     Patient ID: [id number]    Chief Complaint: m followup w/us      HPI: Norma Denise is a 25 y.o. adult  at 32w6d gestation with Estimated Date of Delivery: 25  who is here for follow-up consultation by M.    She has type 2 diabetes, diagnosed as a child.  She is on metformin 500 mg in the evening and insulin: 14 units NPH in the morning and 64 units NPH at bedtime as of 2025, and she states is taking as directed.  She had hemoglobin A1c 6.0% on .  She had fetal echo on 25 that was normal. She has history of shoulder dystocia in a previous pregnancy.  That child was 8 lb 4 oz at birth.  She also had preeclampsia in her previous pregnancy.  She is on low-dose aspirin twice daily.  She has history of epilepsy, diagnosed as a child.  She is on Keppra 750 mg BID and folic acid 1 mg daily.  She has not had any recent seizures.  She follows with Dr. High. She had increased BMI of 34.45 on initial MFM consult visit.  She has bipolar disorder, and she reported history of postpartum psychosis after her last delivery.  She was advised to restart a mood stabilizer under the care of mental health provider, but has not done that yet.   We reached out to her clinic and provider did not return call.  She was asked to have them call  our clinic on her follow-up visit but that has not been done yet. She reports feeling well and following with the mental health clinic daily.  She had  rupture of membrane at 36 weeks 3 days in her last pregnancy. She had cervical insufficiency ruled out this pregnancy. This is a short interval pregnancy.  She has been intermittently non compliant with her followup visits here.       Interval history since last MFM visit: None.. She denies any leaking fluid, vaginal bleeding, contractions, decreased fetal movement. Denies headaches, visual disturbances, or epigastric pain.    Pregnancy complications include:  "  Patient Active Problem List   Diagnosis    Bipolar disease during pregnancy in third trimester    Pre-existing type 2 diabetes mellitus during pregnancy in third trimester    Seizure disorder during pregnancy in third trimester    Hx of preeclampsia, prior pregnancy, currently pregnant, second trimester    At high risk for complications of intrauterine pregnancy (IUP)    BMI>30 affecting pregnancy in third trimester    Functional neurological symptom disorder with attacks or seizures    Localization-related (focal) (partial) idiopathic epilepsy and epileptic syndromes with seizures of localized onset, not intractable, without status epilepticus    Maternal care for trend for excessive fetal growth in third trimester    Poor historian    Previous  delivery, antepartum    Short interval between pregnancies affecting pregnancy in third trimester, antepartum    Noncompliance       No changes to medical, surgical, family, social, or obstetric history.    Medications:  Current Outpatient Medications   Medication Instructions    acetaminophen (TYLENOL) 325 mg, Every 4 hours PRN    albuterol (PROVENTIL/VENTOLIN HFA) 90 mcg/actuation inhaler 1-2 puffs, Inhalation, Every 6 hours PRN, Rescue    aspirin (ECOTRIN) 162 mg, Oral, Daily, Start at 12 weeks gestation.  At 36 weeks gestation, decrease to one 81mg tablet daily.    blood sugar diagnostic (TRUE METRIX GLUCOSE TEST STRIP) Strp Use 1 strip to check glucose via meter four times daily    folic acid (FOLVITE) 1 mg, Oral, Daily    HumuLIN N NPH U-100 Insulin 12 Units, Subcutaneous, Nightly    insulin lispro (HUMALOG U-100 INSULIN) 100 unit/mL Crtg Use 1 unit of Humalog for every 8 mg of bs over 140    insulin lispro 100 unit/mL injection     insulin syringe-needle U-100 0.5 mL 31 gauge x 5/16" Syrg 1 Syringe, Misc.(Non-Drug; Combo Route), Nightly    lancets Misc To check BG 4 times daily, to use with insurance preferred meter. Check blood glucose 4 times/day: every " "morning before you eat (fasting) as well as 1 hour after each meal (breakfast, lunch, dinner). Record results for MD to review.    levETIRAcetam (KEPPRA) 750 mg, Oral, 2 times daily    magnesium oxide (MAG-OX) 400 mg, Oral, Daily, For the prevention of headaches    metFORMIN (GLUCOPHAGE) 1,000 mg, Oral, 2 times daily with meals    prenatal vit no.130-iron-folic (PRENATAL VITAMIN) 27 mg iron- 800 mcg Tab 1 tablet, Oral, Daily       Review of Systems   12 point review of systems conducted, negative except as stated in the history of present illness. See HPI for details.      Objective:     Visit Vitals  /80 (BP Location: Right arm, Patient Position: Sitting)   Pulse (!) 111   Ht 5' 5" (1.651 m)   Wt 96.2 kg (212 lb)   LMP 2024 (Approximate)   BMI 35.28 kg/m²        Physical Exam  Vitals and nursing note reviewed.   Constitutional:       Appearance: Normal appearance.   HENT:      Head: Normocephalic and atraumatic.   Cardiovascular:      Rate and Rhythm: Normal rate and regular rhythm.   Pulmonary:      Effort: Pulmonary effort is normal. No respiratory distress.      Breath sounds: Normal breath sounds.   Abdominal:      Palpations: Abdomen is soft.      Tenderness: There is no abdominal tenderness.   Musculoskeletal:      Right lower leg: No edema.      Left lower leg: No edema.   Neurological:      Mental Status: Arlen is alert and oriented to person, place, and time.   Psychiatric:         Mood and Affect: Mood normal.         Behavior: Behavior normal.         Thought Content: Thought content normal.         Judgment: Judgment normal.         Assessment/Plan:     25 y.o.  female with IUP at 32w6d    Seizure disorder   Trend for excessive fetal growth with an EFW of 2214 g at the 53% and the AC at the 98% on 25.  AFV is normal.  BPP 8/ on 2025     The risks of seizure outweigh any risk of medication and advised her to continue current medication regimen (Keppra 750 mg BID), and continue " "folic acid 1mg daily. In the future, recommend folic acid supplementation with 4mg PO daily for 1-3 months prior to conception and throughout first trimester.        If she has any seizures, she needs to report that immediately.  It is also recommended to avoid driving or operating heavy/hazardous equipment until 6 months post last seizure.    It is recommended to optimize antiepileptic medications by monitoring levels under the guidance of neurology.  She was advised to maintain follow-up with neurology and continue medication as directed. Advised to keep follow-up with Dr. High as planned.    Fetal surveillance as below.    Breastfeeding can be at the patient's discretion.  Most studies have found that the benefits of breast feeding outweigh the risks of medication exposure. The patient was counseled to avoid triggers (sleep deprivation) and to ensure additional supervision with feeding, changing, and bathing baby after birth. Consideration of potential interaction between her AED and desired birth control method should be reviewed in the third trimester and postpartum (by the patient's primary OB provider).        Type 2 diabetes   Risks associated with diabetes in pregnancy include higher risk for polyhydramnios, fetal macrosomia and  metabolic complications (hypoglycemia, hyperbilirubinemia, hypocalcemia, erythema).     Continue 2200 calorie ADA diet.     Log reviewed.  Values are significant weight elevated.  Patient states she "does not think" she is eating during the night.  Accu-Chek in office today was elevated and discrepant from reported values.  However patient reports dietary indiscretion today.  Emphasized importance of strict adherence to diabetic diet.  Patient was advised to continue metformin 500 mg with supper, and adjust dose of insulin to 14 units of NPH in the morning, and 78 units of NPH at bedtime. She will continue 1 unit of Humalog for every 8 mg of sugar over 140 as needed. "     Advised her to check her glucose 1 time at 3 in the morning and let us know that value.    She was advised to continue to check the glucose 4 times a day, and bring log to each appointment.  Emphasized importance of bringing log to each appointment as log is crucial and making optimal insulin adjustments.    Follow-up hemoglobin A1c was ordered today.    Low dose aspirin as discussed.    We will plan to do follow-up growth ultrasounds around every 4 weeks.    Fetal testing we will be done twice a week till delivery.    Reviewed fetal kick count instructions.      Noncompliance  Reviewed the importance of continued care and follow-up as directed with each provider. Reviewed the risks of uncontrolled diabetes and her additional comorbidities in pregnancy and the importance of continued care for her pregnancy complications as above, as well as the risk of FDIU.        Bipolar disorder   Previously discussed the risks of untreated psychiatric disease as well as medication specific risks in pregnancy.     Recommended restarting a mood stabilizer.  She was again reminded to have her mental health provider call us if assistance is needed and restarting a mood stabilizer.  She was advised to continue close follow up with her primary psychiatric provider throughout pregnancy and postpartum period. Advised her to report any worsening symptoms at anytime.    After delivery, she should have a follow-up appointment within 1-2 weeks with both her psychiatric provider as well as an early postpartum visit for a mood check, especially with her reported history of postpartum psychosis.  It would be reasonable to have a discussion about the benefits of breast feeding versus the potential risks of medication exposure in the breast milk.      Elevated BMI   Body mass index is 35.28 kg/m². With relatively stable weight recently, she was advised to continue a healthy low caloric diet and diabetic diet.  Excess weight gain would be  associated with gestational hypertension,  worsening diabetes and adverse  outcomes, including fetal demise in utero.    Reviewed importance of FKC 3/day and prn with instructions to immediately report any decreased fetal movement.    It is important to lose weight after the pregnancy is over, especially before a future pregnancy. Breastfeeding may be an important tool in reducing the postpartum weight retention. Fetal risks were discussed with short term risk of fetal/ obesity and long term risk of adolescent component of metabolic syndrome.      Previous  delivery  She had cervical incompetence ruled out previously.  Expectant management. PTL precautions reviewed.      Short interval pregnancy  A short interpregnancy interval is defined as an interval between the delivery of one pregnancy and the conception of a subsequent pregnancy. Most adverse pregnancy outcomes are associated with an IPI of < 6 months. A short IPI is associated with increased risk of maternal anemia, PPROM/PTB, delivery of a SGA infant, and placental abruption. Additionally, the risk of recurrent preeclampsia is higher with an IPI of < 12 months. Maternal anemia due to iron deficiency should be corrected, if present. Additionally, the risk of uterine rupture is higher in women with a short interdelivery interval (< 18 months- birth to birth interval). Additionally,risk of TOLAC failure is higher and therefore, women should be counseled accordingly.      History of shoulder dystocia  She is aware of risk of recurrence.  Reviewed the importance of optimal glucose control to reduce the risk of recurrence.      History of preeclampsia  With her increased risk for preeclampsia, she agreed to continue asa 81 mg BID until 34 6/7 weeks, then decrease to once daily until delivery. Preeclampsia precautions reviewed.    Follow up in about 1 week (around 2025) for MFM follow-up, BPP; 2 weeks MFM follow-up BPP.     Future  Appointments   Date Time Provider Department Center   2/17/2025  1:00 PM Isma Payne MD [facility] [place] [facility]   2/20/2025  3:30 PM Nolan Juárez MD [facility] [place] [facility]   2/20/2025  3:30 [facility] [place] [facility]   2/25/2025  1:00 PM Isma Payne MD [facility] [place] [facility]   2/27/2025  3:30 PM Nolan Juárez MD [facility] [place] [facility]   2/27/2025  3:30 PM ROOM 2, [facility] [place] [facility]   3/3/2025  1:00 PM Isma Payne MD [facility] [place] [facility]   3/10/2025  1:00 PM Isma aPyne MD [facility] [place] [facility]   3/17/2025  1:00 PM Isma Payne MD [facility] [place] [facility]   4/24/2025  1:00 PM Isma Payne MD [facility] [place] [facility]      Radhika Whitley PA-C     This note was created with the assistance of [employer] voice recognition software. There may be transcription errors as a result of using this technology, however minimal. Effort has been made to ensure accuracy of transcription, but any obvious errors or omissions should be clarified with the author of the document.

## 2025-02-13 ENCOUNTER — PROCEDURE VISIT (OUTPATIENT)
Dept: MATERNAL FETAL MEDICINE | Facility: CLINIC | Age: 25
End: 2025-02-13
Payer: MEDICAID

## 2025-02-13 ENCOUNTER — OFFICE VISIT (OUTPATIENT)
Dept: MATERNAL FETAL MEDICINE | Facility: CLINIC | Age: 25
End: 2025-02-13
Payer: MEDICAID

## 2025-02-13 VITALS
SYSTOLIC BLOOD PRESSURE: 120 MMHG | WEIGHT: 212 LBS | HEIGHT: 65 IN | HEART RATE: 111 BPM | BODY MASS INDEX: 35.32 KG/M2 | DIASTOLIC BLOOD PRESSURE: 80 MMHG

## 2025-02-13 DIAGNOSIS — O36.63X0 ENCOUNTER FOR MATERNAL CARE FOR EXCESSIVE FETAL GROWTH IN THIRD TRIMESTER, SINGLE OR UNSPECIFIED FETUS: ICD-10-CM

## 2025-02-13 DIAGNOSIS — O09.292 HX OF PREECLAMPSIA, PRIOR PREGNANCY, CURRENTLY PREGNANT, SECOND TRIMESTER: ICD-10-CM

## 2025-02-13 DIAGNOSIS — Z91.199 NONCOMPLIANCE: ICD-10-CM

## 2025-02-13 DIAGNOSIS — O99.353 SEIZURE DISORDER DURING PREGNANCY IN THIRD TRIMESTER: Primary | ICD-10-CM

## 2025-02-13 DIAGNOSIS — O99.353 SEIZURE DISORDER DURING PREGNANCY IN THIRD TRIMESTER: ICD-10-CM

## 2025-02-13 DIAGNOSIS — O24.113 PRE-EXISTING TYPE 2 DIABETES MELLITUS DURING PREGNANCY IN THIRD TRIMESTER: ICD-10-CM

## 2025-02-13 DIAGNOSIS — G40.909 SEIZURE DISORDER DURING PREGNANCY IN THIRD TRIMESTER: ICD-10-CM

## 2025-02-13 DIAGNOSIS — Z78.9 POOR HISTORIAN: ICD-10-CM

## 2025-02-13 DIAGNOSIS — O09.893 SHORT INTERVAL BETWEEN PREGNANCIES AFFECTING PREGNANCY IN THIRD TRIMESTER, ANTEPARTUM: ICD-10-CM

## 2025-02-13 DIAGNOSIS — O09.219 PREVIOUS PRETERM DELIVERY, ANTEPARTUM: ICD-10-CM

## 2025-02-13 DIAGNOSIS — G40.909 SEIZURE DISORDER DURING PREGNANCY IN THIRD TRIMESTER: Primary | ICD-10-CM

## 2025-02-13 DIAGNOSIS — O99.213 OBESITY AFFECTING PREGNANCY IN THIRD TRIMESTER, UNSPECIFIED OBESITY TYPE: ICD-10-CM

## 2025-02-13 DIAGNOSIS — O99.343 BIPOLAR DISEASE DURING PREGNANCY IN THIRD TRIMESTER: ICD-10-CM

## 2025-02-13 DIAGNOSIS — F31.9 BIPOLAR DISEASE DURING PREGNANCY IN THIRD TRIMESTER: ICD-10-CM

## 2025-02-13 LAB — GLUCOSE SERPL-MCNC: 165 MG/DL (ref 70–110)

## 2025-02-13 RX ORDER — INSULIN LISPRO 100 [IU]/ML
INJECTION, SOLUTION INTRAVENOUS; SUBCUTANEOUS
COMMUNITY
Start: 2025-02-07

## 2025-02-14 DIAGNOSIS — O24.113 PRE-EXISTING TYPE 2 DIABETES MELLITUS DURING PREGNANCY IN THIRD TRIMESTER: ICD-10-CM

## 2025-02-14 DIAGNOSIS — G40.909 SEIZURE DISORDER DURING PREGNANCY IN THIRD TRIMESTER: Primary | ICD-10-CM

## 2025-02-14 DIAGNOSIS — O99.343 BIPOLAR DISEASE DURING PREGNANCY IN THIRD TRIMESTER: ICD-10-CM

## 2025-02-14 DIAGNOSIS — O99.353 SEIZURE DISORDER DURING PREGNANCY IN THIRD TRIMESTER: Primary | ICD-10-CM

## 2025-02-14 DIAGNOSIS — O09.219 PREVIOUS PRETERM DELIVERY, ANTEPARTUM: ICD-10-CM

## 2025-02-14 DIAGNOSIS — O99.213 OBESITY AFFECTING PREGNANCY IN THIRD TRIMESTER, UNSPECIFIED OBESITY TYPE: ICD-10-CM

## 2025-02-14 DIAGNOSIS — F31.9 BIPOLAR DISEASE DURING PREGNANCY IN THIRD TRIMESTER: ICD-10-CM

## 2025-02-17 DIAGNOSIS — O24.113 PRE-EXISTING TYPE 2 DIABETES MELLITUS DURING PREGNANCY IN THIRD TRIMESTER: ICD-10-CM

## 2025-02-17 DIAGNOSIS — O99.353 SEIZURE DISORDER DURING PREGNANCY IN THIRD TRIMESTER: Primary | ICD-10-CM

## 2025-02-17 DIAGNOSIS — O99.213 OBESITY AFFECTING PREGNANCY IN THIRD TRIMESTER, UNSPECIFIED OBESITY TYPE: ICD-10-CM

## 2025-02-17 DIAGNOSIS — F31.9 BIPOLAR DISEASE DURING PREGNANCY IN THIRD TRIMESTER: ICD-10-CM

## 2025-02-17 DIAGNOSIS — O09.893 SHORT INTERVAL BETWEEN PREGNANCIES AFFECTING PREGNANCY IN THIRD TRIMESTER, ANTEPARTUM: ICD-10-CM

## 2025-02-17 DIAGNOSIS — O09.219 PREVIOUS PRETERM DELIVERY, ANTEPARTUM: ICD-10-CM

## 2025-02-17 DIAGNOSIS — G40.909 SEIZURE DISORDER DURING PREGNANCY IN THIRD TRIMESTER: Primary | ICD-10-CM

## 2025-02-17 DIAGNOSIS — O99.343 BIPOLAR DISEASE DURING PREGNANCY IN THIRD TRIMESTER: ICD-10-CM

## 2025-02-18 NOTE — PROGRESS NOTES
Maternal Fetal Medicine Follow Up      Subjective:     Patient ID: [id number]    Chief Complaint: m followup w/us      HPI: Norma Denise is a 25 y.o. adult  at 33w6d gestation with Estimated Date of Delivery: 25  who is here for follow-up consultation by M.    She has type 2 diabetes, diagnosed as a child.  She is on metformin 500 mg in the evening and insulin: 14 units NPH in the morning and 78 units NPH at bedtime. She had hemoglobin A1c 6.0% on .  She had fetal echo on 25 that was normal. She has history of shoulder dystocia in a previous pregnancy.  That child was 8 lb 4 oz at birth.  She also had preeclampsia in her previous pregnancy.  She is on low-dose aspirin twice daily.  She has history of epilepsy, diagnosed as a child.  She is on Keppra 750 mg BID and folic acid 1 mg daily.  She has not had any recent seizures.  She follows with Dr. High. She had increased BMI of 34.45 on initial MFM consult visit.  She has bipolar disorder, and she reported history of postpartum psychosis after her last delivery.  She was advised to restart a mood stabilizer under the care of mental health provider, but has not done that yet.   We reached out to her clinic and provider did not return call.  She was asked to have them call  our clinic on her follow-up visit but that has not been done yet. She reports feeling well and following with the mental health clinic daily.  She stated that she has not started any medication at this time with patient taking Abilify and Latuda, before preganncy.  She had  rupture of membrane at 36 weeks 3 days in her last pregnancy. She had cervical insufficiency ruled out this pregnancy. This is a short interval pregnancy.  She has been intermittently non compliant with her followup visits here.       Interval history since last MFM visit: None.. She denies any leaking fluid, vaginal bleeding, contractions, decreased fetal movement. Denies headaches, visual  "disturbances, or epigastric pain.    Pregnancy complications include:   Patient Active Problem List   Diagnosis    Bipolar disease during pregnancy in third trimester    Pre-existing type 2 diabetes mellitus during pregnancy in third trimester    Seizure disorder during pregnancy in third trimester    Hx of preeclampsia, prior pregnancy, currently pregnant, second trimester    At high risk for complications of intrauterine pregnancy (IUP)    BMI>30 affecting pregnancy in third trimester    Functional neurological symptom disorder with attacks or seizures    Localization-related (focal) (partial) idiopathic epilepsy and epileptic syndromes with seizures of localized onset, not intractable, without status epilepticus    Maternal care for trend for excessive fetal growth in third trimester    Poor historian    Previous  delivery, antepartum    Short interval between pregnancies affecting pregnancy in third trimester, antepartum    Noncompliance       No changes to medical, surgical, family, social, or obstetric history.    Medications:  Current Outpatient Medications   Medication Instructions    acetaminophen (TYLENOL) 325 mg, Every 4 hours PRN    albuterol (PROVENTIL/VENTOLIN HFA) 90 mcg/actuation inhaler 1-2 puffs, Inhalation, Every 6 hours PRN, Rescue    aspirin (ECOTRIN) 162 mg, Oral, Daily, Start at 12 weeks gestation.  At 36 weeks gestation, decrease to one 81mg tablet daily.    blood sugar diagnostic (TRUE METRIX GLUCOSE TEST STRIP) Strp Use 1 strip to check glucose via meter four times daily    folic acid (FOLVITE) 1 mg, Oral, Daily    HumuLIN N NPH U-100 Insulin 12 Units, Subcutaneous, Nightly    insulin lispro (HUMALOG U-100 INSULIN) 100 unit/mL Crtg Use 1 unit of Humalog for every 8 mg of bs over 140    insulin lispro 100 unit/mL injection     insulin syringe-needle U-100 0.5 mL 31 gauge x 5/16" Syrg 1 Syringe, Misc.(Non-Drug; Combo Route), Nightly    lancets Misc To check BG 4 times daily, to use " "with insurance preferred meter. Check blood glucose 4 times/day: every morning before you eat (fasting) as well as 1 hour after each meal (breakfast, lunch, dinner). Record results for MD to review.    levETIRAcetam (KEPPRA) 750 mg, Oral, 2 times daily    magnesium oxide (MAG-OX) 400 mg, Oral, Daily, For the prevention of headaches    metFORMIN (GLUCOPHAGE) 1,000 mg, Oral, 2 times daily with meals    prenatal vit no.130-iron-folic (PRENATAL VITAMIN) 27 mg iron- 800 mcg Tab 1 tablet, Oral, Daily       Review of Systems   12 point review of systems conducted, negative except as stated in the history of present illness. See HPI for details.      Objective:     Visit Vitals  /73 (BP Location: Left arm, Patient Position: Sitting)   Pulse 93   Ht 5' 5" (1.651 m)   Wt 96.9 kg (213 lb 11.2 oz)   LMP 2024 (Approximate)   BMI 35.56 kg/m²        Physical Exam  Vitals and nursing note reviewed.   Constitutional:       Appearance: Normal appearance.   HENT:      Head: Normocephalic and atraumatic.   Cardiovascular:      Rate and Rhythm: Normal rate and regular rhythm.   Pulmonary:      Effort: Pulmonary effort is normal. No respiratory distress.      Breath sounds: Normal breath sounds.   Abdominal:      Palpations: Abdomen is soft.      Tenderness: There is no abdominal tenderness.   Musculoskeletal:      Right lower leg: No edema.      Left lower leg: No edema.   Neurological:      Mental Status: Arlen is alert and oriented to person, place, and time.   Psychiatric:         Mood and Affect: Mood normal.         Behavior: Behavior normal.         Thought Content: Thought content normal.         Judgment: Judgment normal.         Assessment/Plan:     25 y.o.  female with IUP at 33w6d    Seizure disorder   Trend for excessive fetal growth with an EFW of 2214 g at the 53% and the AC at the 98% on 25.  AFV is normal.  BPP 8/ on 2025     The risks of seizure outweigh any risk of medication and advised her " to continue current medication regimen (Keppra 750 mg BID), and continue folic acid 1mg daily. In the future, recommend folic acid supplementation with 4mg PO daily for 1-3 months prior to conception and throughout first trimester.        If she has any seizures, she needs to report that immediately.  It is also recommended to avoid driving or operating heavy/hazardous equipment until 6 months post last seizure.    It is recommended to optimize antiepileptic medications by monitoring levels under the guidance of neurology.  She was advised to maintain follow-up with neurology and continue medication as directed. Advised to keep follow-up with Dr. High as planned.    Fetal surveillance as below.    Breastfeeding can be at the patient's discretion.  Most studies have found that the benefits of breast feeding outweigh the risks of medication exposure. The patient was counseled to avoid triggers (sleep deprivation) and to ensure additional supervision with feeding, changing, and bathing baby after birth. Consideration of potential interaction between her AED and desired birth control method should be reviewed in the third trimester and postpartum (by the patient's primary OB provider).        Type 2 diabetes   Risks associated with diabetes in pregnancy include higher risk for polyhydramnios, fetal macrosomia and  metabolic complications (hypoglycemia, hyperbilirubinemia, hypocalcemia, erythema).     Continue 2200 calorie ADA diet.     Log reviewed.  Patient reported 1 time sugar being low at 3:00 a.m. with fasting blood sugars and some high sugars in the afternoon. I advised patient to adjust regimen of treatment  with metformin 500 mg with supper, and  18 units of NPH in the morning, and 70 units of NPH at bedtime. She will continue 1 unit of Humalog for every 8 mg of sugar over 140 as needed.     She was advised to continue to check the glucose 4 times a day, and bring log to each appointment.     Low dose  aspirin as discussed.    We will plan to do follow-up growth ultrasounds around every 4 weeks.    Fetal testing we will be done twice a week till delivery.    Reviewed fetal kick count instructions.      Noncompliance  Reviewed the importance of continued care and follow-up as directed with each provider. Reviewed the risks of uncontrolled diabetes and her additional comorbidities in pregnancy and the importance of continued care for her pregnancy complications as above, as well as the risk of FDIU.        Bipolar disorder   Previously discussed the risks of untreated psychiatric disease as well as medication specific risks in pregnancy.     Recommended restarting a mood stabilizer.   She was again reminded to have her mental health provider call us if assistance is needed and restarting a mood stabilizer.  She was advised to continue close follow up with her primary psychiatric provider throughout pregnancy and postpartum period. Advised her to report any worsening symptoms at anytime.    After delivery, she should have a follow-up appointment within 1-2 weeks with both her psychiatric provider as well as an early postpartum visit for a mood check, especially with her reported history of postpartum psychosis.  It would be reasonable to have a discussion about the benefits of breast feeding versus the potential risks of medication exposure in the breast milk.      Elevated BMI   Body mass index is 35.56 kg/m². With stable weight since last visit, she was advised to continue healthy, diabetic diet avoiding any excessive weight gain. Excess weight gain would be associated with gestational hypertension,  worsening diabetes and adverse  outcomes, including fetal demise in utero.    Reviewed importance of FKC 3/day and prn with instructions to immediately report any decreased fetal movement.    It is important to lose weight after the pregnancy is over, especially before a future pregnancy. Breastfeeding may be  an important tool in reducing the postpartum weight retention. Fetal risks were discussed with short term risk of fetal/ obesity and long term risk of adolescent component of metabolic syndrome.      Previous  delivery  She had cervical incompetence ruled out previously.  Expectant management. PTL precautions reviewed.      Short interval pregnancy  A short interpregnancy interval is defined as an interval between the delivery of one pregnancy and the conception of a subsequent pregnancy. Most adverse pregnancy outcomes are associated with an IPI of < 6 months. A short IPI is associated with increased risk of maternal anemia, PPROM/PTB, delivery of a SGA infant, and placental abruption. Additionally, the risk of recurrent preeclampsia is higher with an IPI of < 12 months. Maternal anemia due to iron deficiency should be corrected, if present. Additionally, the risk of uterine rupture is higher in women with a short interdelivery interval (< 18 months- birth to birth interval). Additionally,risk of TOLAC failure is higher and therefore, women should be counseled accordingly.      History of shoulder dystocia  She is aware of risk of recurrence.  Reviewed the importance of optimal glucose control to reduce the risk of recurrence.      History of preeclampsia  With her increased risk for preeclampsia, she agreed to continue asa 81 mg BID until 34 6/7 weeks, then decrease to once daily until delivery. Preeclampsia precautions reviewed.    Follow up in about 1 week (around 2025) for [facility] follow-up, BPP.     Future Appointments   Date Time Provider Department Center   2025  1:00 PM Isma Payne MD [facility]   2025  2:30 PM Nolan Juárez MD [facility]   2025  2:30 PM ROOM 2, [facility]   3/5/2025  1:00 PM Isma Payne MD [facility]   3/6/2025  2:30 PM Nolan Juárez MD [facility]   3/6/2025  2:30 PM ROOM 2,  [facility]   3/10/2025  1:00 PM Isma Payne MD [facility]   3/17/2025  1:00 PM Isma Payne MD [facility] [place] [facility]   4/24/2025  1:00 PM Isma Payne MD [facility]      Patient was evaluated and examined by Dr. Juárez. DONALD Salazar, helped in pre charting of part of note.      This note was created with the assistance of [employer] voice recognition software. There may be transcription errors as a result of using this technology, however minimal. Effort has been made to ensure accuracy of transcription, but any obvious errors or omissions should be clarified with the author of the document.

## 2025-02-20 ENCOUNTER — OFFICE VISIT (OUTPATIENT)
Dept: MATERNAL FETAL MEDICINE | Facility: CLINIC | Age: 25
End: 2025-02-20
Payer: MEDICAID

## 2025-02-20 ENCOUNTER — PROCEDURE VISIT (OUTPATIENT)
Dept: MATERNAL FETAL MEDICINE | Facility: CLINIC | Age: 25
End: 2025-02-20
Payer: MEDICAID

## 2025-02-20 VITALS
WEIGHT: 213.69 LBS | HEIGHT: 65 IN | DIASTOLIC BLOOD PRESSURE: 73 MMHG | SYSTOLIC BLOOD PRESSURE: 122 MMHG | BODY MASS INDEX: 35.6 KG/M2 | HEART RATE: 93 BPM

## 2025-02-20 DIAGNOSIS — O24.113 PRE-EXISTING TYPE 2 DIABETES MELLITUS DURING PREGNANCY IN THIRD TRIMESTER: ICD-10-CM

## 2025-02-20 DIAGNOSIS — O99.353 SEIZURE DISORDER DURING PREGNANCY IN THIRD TRIMESTER: ICD-10-CM

## 2025-02-20 DIAGNOSIS — G40.909 SEIZURE DISORDER DURING PREGNANCY IN THIRD TRIMESTER: ICD-10-CM

## 2025-02-20 DIAGNOSIS — F31.9 BIPOLAR DISEASE DURING PREGNANCY IN THIRD TRIMESTER: ICD-10-CM

## 2025-02-20 DIAGNOSIS — O99.343 BIPOLAR DISEASE DURING PREGNANCY IN THIRD TRIMESTER: ICD-10-CM

## 2025-02-20 DIAGNOSIS — O36.63X0 ENCOUNTER FOR MATERNAL CARE FOR EXCESSIVE FETAL GROWTH IN THIRD TRIMESTER, SINGLE OR UNSPECIFIED FETUS: ICD-10-CM

## 2025-02-20 DIAGNOSIS — O99.213 OBESITY AFFECTING PREGNANCY IN THIRD TRIMESTER, UNSPECIFIED OBESITY TYPE: ICD-10-CM

## 2025-02-20 DIAGNOSIS — Z91.199 NONCOMPLIANCE: Primary | ICD-10-CM

## 2025-02-20 DIAGNOSIS — O09.219 PREVIOUS PRETERM DELIVERY, ANTEPARTUM: ICD-10-CM

## 2025-02-20 LAB — GLUCOSE SERPL-MCNC: 109 MG/DL (ref 70–110)

## 2025-02-21 DIAGNOSIS — F31.9 BIPOLAR DISEASE DURING PREGNANCY IN THIRD TRIMESTER: ICD-10-CM

## 2025-02-21 DIAGNOSIS — O09.219 PREVIOUS PRETERM DELIVERY, ANTEPARTUM: ICD-10-CM

## 2025-02-21 DIAGNOSIS — G40.909 SEIZURE DISORDER DURING PREGNANCY IN THIRD TRIMESTER: ICD-10-CM

## 2025-02-21 DIAGNOSIS — O99.353 SEIZURE DISORDER DURING PREGNANCY IN THIRD TRIMESTER: ICD-10-CM

## 2025-02-21 DIAGNOSIS — O09.893 SHORT INTERVAL BETWEEN PREGNANCIES AFFECTING PREGNANCY IN THIRD TRIMESTER, ANTEPARTUM: ICD-10-CM

## 2025-02-21 DIAGNOSIS — O99.213 OBESITY AFFECTING PREGNANCY IN THIRD TRIMESTER, UNSPECIFIED OBESITY TYPE: Primary | ICD-10-CM

## 2025-02-21 DIAGNOSIS — O24.113 PRE-EXISTING TYPE 2 DIABETES MELLITUS DURING PREGNANCY IN THIRD TRIMESTER: ICD-10-CM

## 2025-02-21 DIAGNOSIS — O36.63X0 ENCOUNTER FOR MATERNAL CARE FOR EXCESSIVE FETAL GROWTH IN THIRD TRIMESTER, SINGLE OR UNSPECIFIED FETUS: ICD-10-CM

## 2025-02-21 DIAGNOSIS — O99.343 BIPOLAR DISEASE DURING PREGNANCY IN THIRD TRIMESTER: ICD-10-CM

## 2025-02-27 ENCOUNTER — TELEPHONE (OUTPATIENT)
Dept: MATERNAL FETAL MEDICINE | Facility: CLINIC | Age: 25
End: 2025-02-27

## 2025-02-27 NOTE — TELEPHONE ENCOUNTER
Pt called office to stating she would be late due to vehicle issues at 2:29p.m.  She was informed of the late patient policy and verbalized understanding.     Called pt in reference to her not showing up and she stated that they were still having issues and would not be able to make it. Patient is familiar with kick counts and if decreased fetal movement needs to go in to [facility] & [facility].     Faxed missed appt notice to ob

## 2025-03-02 ENCOUNTER — HOSPITAL ENCOUNTER (EMERGENCY)
Facility: HOSPITAL | Age: 25
Discharge: HOME | End: 2025-03-03
Payer: MEDICAID

## 2025-03-02 DIAGNOSIS — O47.00 PRETERM CONTRACTIONS: Primary | ICD-10-CM

## 2025-03-02 LAB
CTP QC/QA: YES
RUPTURE OF MEMBRANE: NEGATIVE

## 2025-03-02 PROCEDURE — 99284 EMERGENCY DEPT VISIT MOD MDM: CPT | Mod: 25

## 2025-03-02 PROCEDURE — 84112 EVAL AMNIOTIC FLUID PROTEIN: CPT

## 2025-03-02 PROCEDURE — 96372 THER/PROPH/DIAG INJ SC/IM: CPT | Performed by: OBSTETRICS & GYNECOLOGY

## 2025-03-02 PROCEDURE — 63600175 PHARM REV CODE 636 W HCPCS: Performed by: OBSTETRICS & GYNECOLOGY

## 2025-03-02 RX ORDER — TERBUTALINE SULFATE 1 MG/ML
0.25 INJECTION SUBCUTANEOUS ONCE
Status: COMPLETED | OUTPATIENT
Start: 2025-03-02 | End: 2025-03-02

## 2025-03-02 RX ADMIN — TERBUTALINE SULFATE 0.25 MG: 1 INJECTION, SOLUTION SUBCUTANEOUS at 09:03

## 2025-03-02 RX ADMIN — SODIUM CHLORIDE, POTASSIUM CHLORIDE, SODIUM LACTATE AND CALCIUM CHLORIDE 1000 ML: 600; 310; 30; 20 INJECTION, SOLUTION INTRAVENOUS at 08:03

## 2025-03-02 RX ADMIN — SODIUM CHLORIDE, POTASSIUM CHLORIDE, SODIUM LACTATE AND CALCIUM CHLORIDE 1000 ML: 600; 310; 30; 20 INJECTION, SOLUTION INTRAVENOUS at 09:03

## 2025-03-03 VITALS
HEART RATE: 85 BPM | DIASTOLIC BLOOD PRESSURE: 73 MMHG | RESPIRATION RATE: 20 BRPM | SYSTOLIC BLOOD PRESSURE: 131 MMHG | TEMPERATURE: 98 F | OXYGEN SATURATION: 100 %

## 2025-03-03 PROBLEM — O47.00 PRETERM CONTRACTIONS: Status: RESOLVED | Noted: 2025-03-02 | Resolved: 2025-03-03

## 2025-03-03 NOTE — DISCHARGE SUMMARY
Please follow up with all scheduled provider visits. Return to [facility] with complaints of water/fluid leaking from the vagina, vaginal bleeding, decreased fetal movement,  4 or more painful contractions in an hour, or a persistent headache that does not resolve with normal measures(rest, tylenol, hydration). Perform kick counts when needed and drink 6-10L of water a day.

## 2025-03-03 NOTE — ED PROVIDER NOTES
[facility] NOTE  [facility]     Admit Date: 3/2/2025  [facility] Physician: Conchis Houser  Primary OBGYN:  Dr. Payne    Admit Diagnosis/Chief Complaint: Contractions  Discharge Diagnosis:   contractions    Chief Complaint   Patient presents with    Abdominal Pain     IUP 35.2 c/o abd pain since Friday, bloody mucous and leaking. Pregnancy complicated by T2DM       Hospital Course:  Norma Denise is a 25 y.o.  at 35w2d presents complaining of contractions/abdominal pain along with clear vaginal discharge.   This IUP is complicated by h/o seizure disorder, bipolar disorder, T2DM and obesity. She has a history of previous c/s x 1 with plans for RLTCS.       Fetal Movement: normal.    /73   Pulse 86   Temp 97.8 °F (36.6 °C) (Oral)   Resp (!) 2   LMP 2024 (Approximate)   SpO2 100%   Temp:  [97.8 °F (36.6 °C)] 97.8 °F (36.6 °C)  Pulse:  [] 86  Resp:  [2] 2  SpO2:  [99 %-100 %] 100 %  BP: (124-131)/(73-87) 131/73    General: in no apparent distress well developed and well nourished  Cardiovascular: regular rate and rhythm no murmurs  Respiratory: clear to auscultation, no wheezes, rales or rhonchi, symmetric air entry unlabored  Abdominal: FHT present  Back: lumbar tenderness absent CVA tenderness none suprapubic tenderness absent  Extremeties no redness or tenderness in the calves or thighs no edema    SVE (PeriWATCH)  Dilation (cm): 1  Effacement (%): 40  Station: -4  Cervical Position: Posterior  Examined by:: LAXMI Crowe RN  Simplified Guevara Score: 2         EFM: Cat 1,  modBTV, +accel, no decel (reassuring, reactive)  TOCO:  ctx q5-6 mins      LABS:     Recent Results (from the past 24 hours)   POCT Rupture of membrane    Collection Time: 25  7:56 PM   Result Value Ref Range    Rupture of Membrane Negative Negative     Acceptable Yes        Imaging Results    None        BPP 8/    ASSESMENT and clinical impression: Norma  Olivia Denise is a 25 y.o.   at 35w2d with  contractions - improved since presentation    Discharge Diagnosis/clinical impression:   Problem List[1]    Status:Stable    Disposition:  discharged to home      Patient Instructions:   - Pt was given routine pregnancy instructions including to return to triage if she had any vaginal bleeding (other than spotting for the next 48hrs), any loss of fluid like her water broke, decreased fetal movement, or contractions Q 5min lasting for 2 or more hours. Pt was also instructed to drink copious water. Patient voiced understanding of all these instructions and was subsequently discharged home. Tylenol use and maternity belt use discussed. All questions answered. Pt left [facility] with good understanding of plan.   Preeclampsia/ROM/labor/fever/decreased FM with FKC precautions discussed, voiced understanding     She will follow up with her primary OB as scheduled    Conchis Houser MD  OB/GYN Hospitalist  9:03 PM 2025         [1]   Patient Active Problem List  Diagnosis    Bipolar disease during pregnancy in third trimester    Pre-existing type 2 diabetes mellitus during pregnancy in third trimester    Seizure disorder during pregnancy in third trimester    Hx of preeclampsia, prior pregnancy, currently pregnant, second trimester    At high risk for complications of intrauterine pregnancy (IUP)    BMI>30 affecting pregnancy in third trimester    Functional neurological symptom disorder with attacks or seizures    Localization-related (focal) (partial) idiopathic epilepsy and epileptic syndromes with seizures of localized onset, not intractable, without status epilepticus    Maternal care for trend for excessive fetal growth in third trimester    Poor historian    Previous  delivery, antepartum    Short interval between pregnancies affecting pregnancy in third trimester, antepartum    Noncompliance     contractions

## 2025-03-03 NOTE — PROGRESS NOTES
Maternal Fetal Medicine Follow Up      Subjective:     Patient ID: [id number]    Chief Complaint: MFM follow up with US (Type 2 Diabetes.  )      HPI: Norma Denise is a 25 y.o. adult  at 35w6d gestation with Estimated Date of Delivery: 25  who is here for follow-up consultation by MFM.    She has type 2 diabetes, diagnosed as a child.  She is on metformin 500 mg in the evening and insulin: 18 units NPH in the morning and 70 units NPH at bedtime. She had hemoglobin A1c 6.0% on .  She had fetal echo on 25 that was normal. She has history of shoulder dystocia in a previous pregnancy.  That child was 8 lb 4 oz at birth.  She also had preeclampsia in her previous pregnancy.  She is on low-dose aspirin  daily.  She has history of epilepsy, diagnosed as a child.  She is on Keppra 750 mg BID and folic acid 1 mg daily.  She has not had any recent seizures.  She follows with Dr. High. She had increased BMI of 34.45 on initial MFM consult visit.  She has bipolar disorder, and she reported history of postpartum psychosis after her last delivery.  She was advised to restart a mood stabilizer under the care of mental health provider, but has not done that yet.   We reached out to her clinic and provider did not return call.  She was asked to have them call  our clinic on her follow-up visit but that has not been done yet. She reports feeling well and following with the mental health clinic daily.  She stated that she has not started any medication at this time with patient taking Abilify and Latuda, before preganncy.  She had  rupture of membrane at 36 weeks 3 days in her last pregnancy. She had cervical insufficiency ruled out this pregnancy. This is a short interval pregnancy.  She has been intermittently non compliant with her followup visits here.     Patient has not done the order given for hemoglobin A1c but stated she can go and do it today.      Interval history since last MFM visit:  None.. She denies any leaking fluid, vaginal bleeding, contractions, decreased fetal movement. Denies headaches, visual disturbances, or epigastric pain.    Pregnancy complications include:   Patient Active Problem List   Diagnosis    Bipolar disease during pregnancy in third trimester    Pre-existing type 2 diabetes mellitus during pregnancy in third trimester    Seizure disorder during pregnancy in third trimester    Hx of preeclampsia, prior pregnancy, currently pregnant, second trimester    At high risk for complications of intrauterine pregnancy (IUP)    BMI>30 affecting pregnancy in third trimester    Functional neurological symptom disorder with attacks or seizures    Localization-related (focal) (partial) idiopathic epilepsy and epileptic syndromes with seizures of localized onset, not intractable, without status epilepticus    Maternal care for trend for excessive fetal growth in third trimester    Poor historian    Previous  delivery, antepartum    Short interval between pregnancies affecting pregnancy in third trimester, antepartum    Noncompliance    Excessive weight gain during pregnancy in third trimester       No changes to medical, surgical, family, social, or obstetric history.    Medications:  Current Outpatient Medications   Medication Instructions    acetaminophen (TYLENOL) 325 mg, Every 4 hours PRN    albuterol (PROVENTIL/VENTOLIN HFA) 90 mcg/actuation inhaler 1-2 puffs, Inhalation, Every 6 hours PRN, Rescue    aspirin (ECOTRIN) 162 mg, Oral, Daily, Start at 12 weeks gestation.  At 36 weeks gestation, decrease to one 81mg tablet daily.    blood sugar diagnostic (TRUE METRIX GLUCOSE TEST STRIP) Strp Use 1 strip to check glucose via meter four times daily    folic acid (FOLVITE) 1 mg, Oral, Daily    HumuLIN N NPH U-100 Insulin 12 Units, Subcutaneous, Nightly    insulin lispro (HUMALOG U-100 INSULIN) 100 unit/mL Crtg Use 1 unit of Humalog for every 8 mg of bs over 140    insulin lispro 100  "unit/mL injection     insulin syringe-needle U-100 0.5 mL 31 gauge x 5/16" Syrg 1 Syringe, Misc.(Non-Drug; Combo Route), Nightly    lancets Misc To check BG 4 times daily, to use with insurance preferred meter. Check blood glucose 4 times/day: every morning before you eat (fasting) as well as 1 hour after each meal (breakfast, lunch, dinner). Record results for MD to review.    levETIRAcetam (KEPPRA) 750 mg, Oral, 2 times daily    magnesium oxide (MAG-OX) 400 mg, Oral, Daily, For the prevention of headaches    metFORMIN (GLUCOPHAGE) 1,000 mg, Oral, 2 times daily with meals    prenatal vit no.130-iron-folic (PRENATAL VITAMIN) 27 mg iron- 800 mcg Tab 1 tablet, Oral, Daily       Review of Systems   12 point review of systems conducted, negative except as stated in the history of present illness. See HPI for details.      Objective:     Visit Vitals  /82 (BP Location: Right arm, Patient Position: Sitting)   Pulse 79   Ht 5' 5" (1.651 m)   Wt 103.4 kg (228 lb)   LMP 2024 (Approximate)   BMI 37.94 kg/m²        Physical Exam  Vitals and nursing note reviewed.   Constitutional:       Appearance: Normal appearance.   HENT:      Head: Normocephalic and atraumatic.   Cardiovascular:      Rate and Rhythm: Normal rate and regular rhythm.   Pulmonary:      Effort: Pulmonary effort is normal. No respiratory distress.      Breath sounds: Normal breath sounds.   Abdominal:      Palpations: Abdomen is soft.      Tenderness: There is no abdominal tenderness.   Musculoskeletal:      Right lower leg: No edema.      Left lower leg: No edema.   Neurological:      Mental Status: Arlen is alert and oriented to person, place, and time.   Psychiatric:         Mood and Affect: Mood normal.         Behavior: Behavior normal.         Thought Content: Thought content normal.         Judgment: Judgment normal.         Assessment/Plan:     25 y.o.  female with IUP at 35w6d    Seizure disorder   There is continued excessive fetal " growth with an EFW of 3572 g at the 87% and the AC at the >99% (>5 weeks ahead) on 3/6/2025.  AFV is normal.  BPP 8/ on 2025     The risks of seizure outweigh any risk of medication and advised her to continue current medication regimen (Keppra 750 mg BID), and continue folic acid 1mg daily. In the future, recommend folic acid supplementation with 4mg PO daily for 1-3 months prior to conception and throughout first trimester.        If she has any seizures, she needs to report that immediately.  It is also recommended to avoid driving or operating heavy/hazardous equipment until 6 months post last seizure.    It is recommended to optimize antiepileptic medications by monitoring levels under the guidance of neurology.  She was advised to maintain follow-up with neurology and continue medication as directed. Advised to keep follow-up with Dr. High as planned.    Fetal surveillance as below.    Breastfeeding can be at the patient's discretion.  Most studies have found that the benefits of breast feeding outweigh the risks of medication exposure. The patient was counseled to avoid triggers (sleep deprivation) and to ensure additional supervision with feeding, changing, and bathing baby after birth. Consideration of potential interaction between her AED and desired birth control method should be reviewed in the third trimester and postpartum (by the patient's primary OB provider).        Type 2 diabetes   Risks associated with diabetes in pregnancy include higher risk for polyhydramnios, fetal macrosomia and  metabolic complications (hypoglycemia, hyperbilirubinemia, hypocalcemia, erythema).     Continue 2200 calorie ADA diet.     Log reviewed.  Blood sugars are intermittently elevated after meals but persistently elevated fasting blood sugars.  Some elevations are to 160s postprandials although there was 1 reading at 255 ..  Importance of strict diet was emphasized.  She was advised to adjust dose of  metformin to 500 mg twice daily, and adjust dose of insulin to 22 units of NPH in the morning, and 88 units of NPH at bedtime. She will continue 1 unit of Humalog for every 8 mg of sugar over 140 as needed.     She was advised to continue to check the glucose 4 times a day, and bring log to each appointment.     Low dose aspirin as discussed.    Fetal testing will be done twice weekly, alternating with primary OB, until delivery.  Reviewed fetal kick count instructions.    With with suboptimal compliance and intermittent significant elevated blood sugars  recommend delivery at 37 weeks' gestation (37-37 6/7th weeks) as optimal balance between prematurity risks and risks of continued pregnancy. Earlier delivery may be needed for worsening maternal or fetal status.  Discussed with Dr. Payne.      Noncompliance  Reviewed the importance of continued care and follow-up as directed with each provider. Reviewed the risks of uncontrolled diabetes and her additional comorbidities in pregnancy and the importance of continued care for her pregnancy complications as above, as well as the risk of FDIU.        Bipolar disorder   Previously discussed the risks of untreated psychiatric disease as well as medication specific risks in pregnancy.     Recommended restarting a mood stabilizer.   She was again reminded to have her mental health provider call us if assistance is needed and restarting a mood stabilizer.  She was advised to continue close follow up with her primary psychiatric provider throughout pregnancy and postpartum period. Advised her to report any worsening symptoms at anytime.    After delivery, she should have a follow-up appointment within 1-2 weeks with both her psychiatric provider as well as an early postpartum visit for a mood check, especially with her reported history of postpartum psychosis.  It would be reasonable to have a discussion about the benefits of breast feeding versus the potential risks of  medication exposure in the breast milk.      Elevated BMI with excessive weight gain  Body mass index is 37.94 kg/m². With continued excessive gain of 18 lb in the last month, she was advised to Decrease caloric intake and avoid further excessive weight. Excess weight gain would be associated with gestational hypertension,  worsening diabetes and adverse  outcomes, including fetal demise in utero.    Reviewed importance of FKC 3/day and prn with instructions to immediately report any decreased fetal movement.    It is important to lose weight after the pregnancy is over, especially before a future pregnancy. Breastfeeding may be an important tool in reducing the postpartum weight retention. Fetal risks were discussed with short term risk of fetal/ obesity and long term risk of adolescent component of metabolic syndrome.      Previous  delivery  She had cervical incompetence ruled out previously.  Expectant management. PTL precautions reviewed.      Short interval pregnancy  A short interpregnancy interval is defined as an interval between the delivery of one pregnancy and the conception of a subsequent pregnancy. Most adverse pregnancy outcomes are associated with an IPI of < 6 months. A short IPI is associated with increased risk of maternal anemia, PPROM/PTB, delivery of a SGA infant, and placental abruption. Additionally, the risk of recurrent preeclampsia is higher with an IPI of < 12 months. Maternal anemia due to iron deficiency should be corrected, if present. Additionally, the risk of uterine rupture is higher in women with a short interdelivery interval (< 18 months- birth to birth interval). Additionally,risk of TOLAC failure is higher and therefore, women should be counseled accordingly.      History of shoulder dystocia  She is aware of risk of recurrence.  Reviewed the importance of optimal glucose control to reduce the risk of recurrence.      History of preeclampsia  With her  increased risk for preeclampsia, she agreed to continue asa 81 mg daily until delivery. Preeclampsia precautions reviewed.    Follow up in about 1 week (around 3/13/2025) for [facility] follow-up, BPP.     Future Appointments   Date Time Provider Department Center   3/10/2025  1:00 PM Isma Payne MD [facility]   3/13/2025  2:30 PM Nolan Juárez MD [facility]   3/13/2025  2:30 PM ROOM 2, [facility]   3/17/2025  1:00 PM Isma Payne MD [facility]   4/24/2025  1:00 PM Isma Payne MD [facility]      Patient was evaluated and examined by Dr. Juárez. JAYCE Chan, helped in pre charting of part of note.    This note was created with the assistance of [employer] voice recognition software. There may be transcription errors as a result of using this technology, however minimal. Effort has been made to ensure accuracy of transcription, but any obvious errors or omissions should be clarified with the author of the document.

## 2025-03-06 ENCOUNTER — PROCEDURE VISIT (OUTPATIENT)
Dept: MATERNAL FETAL MEDICINE | Facility: CLINIC | Age: 25
End: 2025-03-06
Payer: MEDICAID

## 2025-03-06 ENCOUNTER — LAB VISIT (OUTPATIENT)
Dept: LAB | Facility: HOSPITAL | Age: 25
End: 2025-03-06
Payer: MEDICAID

## 2025-03-06 ENCOUNTER — OFFICE VISIT (OUTPATIENT)
Dept: MATERNAL FETAL MEDICINE | Facility: CLINIC | Age: 25
End: 2025-03-06
Payer: MEDICAID

## 2025-03-06 VITALS
DIASTOLIC BLOOD PRESSURE: 82 MMHG | BODY MASS INDEX: 37.99 KG/M2 | HEIGHT: 65 IN | WEIGHT: 228 LBS | HEART RATE: 79 BPM | SYSTOLIC BLOOD PRESSURE: 128 MMHG

## 2025-03-06 DIAGNOSIS — O99.213 SEVERE OBESITY DUE TO EXCESS CALORIES AFFECTING PREGNANCY IN THIRD TRIMESTER: ICD-10-CM

## 2025-03-06 DIAGNOSIS — O09.219 PREVIOUS PRETERM DELIVERY, ANTEPARTUM: ICD-10-CM

## 2025-03-06 DIAGNOSIS — O24.113 PRE-EXISTING TYPE 2 DIABETES MELLITUS DURING PREGNANCY IN THIRD TRIMESTER: ICD-10-CM

## 2025-03-06 DIAGNOSIS — F31.9 BIPOLAR DISEASE DURING PREGNANCY IN THIRD TRIMESTER: ICD-10-CM

## 2025-03-06 DIAGNOSIS — Z91.199 NONCOMPLIANCE: ICD-10-CM

## 2025-03-06 DIAGNOSIS — O09.893 SHORT INTERVAL BETWEEN PREGNANCIES AFFECTING PREGNANCY IN THIRD TRIMESTER, ANTEPARTUM: ICD-10-CM

## 2025-03-06 DIAGNOSIS — O99.353 SEIZURE DISORDER DURING PREGNANCY IN THIRD TRIMESTER: Primary | ICD-10-CM

## 2025-03-06 DIAGNOSIS — O99.343 BIPOLAR DISEASE DURING PREGNANCY IN THIRD TRIMESTER: ICD-10-CM

## 2025-03-06 DIAGNOSIS — O26.03 EXCESSIVE WEIGHT GAIN DURING PREGNANCY IN THIRD TRIMESTER: ICD-10-CM

## 2025-03-06 DIAGNOSIS — O99.353 SEIZURE DISORDER DURING PREGNANCY IN THIRD TRIMESTER: ICD-10-CM

## 2025-03-06 DIAGNOSIS — G40.909 SEIZURE DISORDER DURING PREGNANCY IN THIRD TRIMESTER: Primary | ICD-10-CM

## 2025-03-06 DIAGNOSIS — E66.01 SEVERE OBESITY DUE TO EXCESS CALORIES AFFECTING PREGNANCY IN THIRD TRIMESTER: ICD-10-CM

## 2025-03-06 DIAGNOSIS — O36.63X0 ENCOUNTER FOR MATERNAL CARE FOR EXCESSIVE FETAL GROWTH IN THIRD TRIMESTER, SINGLE OR UNSPECIFIED FETUS: ICD-10-CM

## 2025-03-06 DIAGNOSIS — O99.213 OBESITY AFFECTING PREGNANCY IN THIRD TRIMESTER, UNSPECIFIED OBESITY TYPE: ICD-10-CM

## 2025-03-06 DIAGNOSIS — G40.909 SEIZURE DISORDER DURING PREGNANCY IN THIRD TRIMESTER: ICD-10-CM

## 2025-03-06 LAB
EST. AVERAGE GLUCOSE BLD GHB EST-MCNC: 125.5 MG/DL
GLUCOSE SERPL-MCNC: 116 MG/DL (ref 70–110)
HBA1C MFR BLD: 6 %

## 2025-03-06 PROCEDURE — 83036 HEMOGLOBIN GLYCOSYLATED A1C: CPT

## 2025-03-06 PROCEDURE — 36415 COLL VENOUS BLD VENIPUNCTURE: CPT

## 2025-03-07 DIAGNOSIS — O99.213 SEVERE OBESITY DUE TO EXCESS CALORIES AFFECTING PREGNANCY IN THIRD TRIMESTER: ICD-10-CM

## 2025-03-07 DIAGNOSIS — O24.113 PRE-EXISTING TYPE 2 DIABETES MELLITUS DURING PREGNANCY IN THIRD TRIMESTER: ICD-10-CM

## 2025-03-07 DIAGNOSIS — E66.01 SEVERE OBESITY DUE TO EXCESS CALORIES AFFECTING PREGNANCY IN THIRD TRIMESTER: ICD-10-CM

## 2025-03-07 DIAGNOSIS — O09.219 PREVIOUS PRETERM DELIVERY, ANTEPARTUM: ICD-10-CM

## 2025-03-07 DIAGNOSIS — O99.353 SEIZURE DISORDER DURING PREGNANCY IN THIRD TRIMESTER: Primary | ICD-10-CM

## 2025-03-07 DIAGNOSIS — G40.909 SEIZURE DISORDER DURING PREGNANCY IN THIRD TRIMESTER: Primary | ICD-10-CM

## 2025-03-10 PROBLEM — R03.0 ELEVATED BLOOD PRESSURE READING WITHOUT DIAGNOSIS OF HYPERTENSION: Status: ACTIVE | Noted: 2025-03-10

## 2025-03-11 NOTE — PROGRESS NOTES
Maternal Fetal Medicine Follow Up      Subjective:     Patient ID: [id number]    Chief Complaint: No chief complaint on file.      HPI: Norma Denise is a 25 y.o. adult  at 36w4d gestation with Estimated Date of Delivery: 25  who is here for follow-up consultation by M.    She has type 2 diabetes, diagnosed as a child.  She is on metformin 500 mg in the evening and insulin: 22 units NPH in the morning and 88 units NPH at bedtime. She had hemoglobin A1c 6.0% on .  Follow-up A1c on 3/6/2025 was 6%. She had fetal echo on 25 that was normal. She has history of shoulder dystocia in a previous pregnancy.  That child was 8 lb 4 oz at birth.  Her last child was very LGA, 9 lb 2 oz at 36 weeks.  She currently has excessive fetal growth this pregnancy as well.  She also had preeclampsia in a previous pregnancy.  She is on low-dose aspirin daily.  She has history of epilepsy, diagnosed as a child.  She is on Keppra 750 mg BID and folic acid 1 mg daily.  She has not had any recent seizures.  She follows with Dr. High. She had increased BMI of 34.45 on initial MFM consult visit.  She has bipolar disorder, and she reported history of postpartum psychosis after her last delivery.  She was advised to restart a mood stabilizer under the care of mental health provider, but has not done that yet.   We reached out to her clinic and provider did not return call.  She was asked to have them call  our clinic on her follow-up visit but that has not been done yet. She reports feeling well and following with the mental health clinic daily.  She stated that she has not started any medication at this time with patient taking Abilify and Latuda, before preganncy.  She had  rupture of membrane at 36 weeks 3 days in her last pregnancy. She had cervical insufficiency ruled out this pregnancy. This is a short interval pregnancy.  She has been intermittently non compliant with her followup visits here.        Interval history since last [facility] visit: None.. She denies any leaking fluid, vaginal bleeding, contractions, decreased fetal movement. Denies headaches, visual disturbances, or epigastric pain.    Pregnancy complications include:   Patient Active Problem List   Diagnosis    Bipolar disease during pregnancy in third trimester    Pre-existing type 2 diabetes mellitus during pregnancy in third trimester    Seizure disorder during pregnancy in third trimester    Hx of preeclampsia, prior pregnancy, currently pregnant, second trimester    At high risk for complications of intrauterine pregnancy (IUP)    BMI>30 affecting pregnancy in third trimester    Functional neurological symptom disorder with attacks or seizures    Localization-related (focal) (partial) idiopathic epilepsy and epileptic syndromes with seizures of localized onset, not intractable, without status epilepticus    Maternal care for trend for excessive fetal growth in third trimester    Poor historian    Previous  delivery, antepartum    Short interval between pregnancies affecting pregnancy in third trimester, antepartum    Noncompliance    Excessive weight gain during pregnancy in third trimester    Elevated blood pressure reading without diagnosis of hypertension       No changes to medical, surgical, family, social, or obstetric history.    Medications:  Current Outpatient Medications   Medication Instructions    acetaminophen (TYLENOL) 325 mg, Every 4 hours PRN    albuterol (PROVENTIL/VENTOLIN HFA) 90 mcg/actuation inhaler 1-2 puffs, Inhalation, Every 6 hours PRN, Rescue    aspirin (ECOTRIN) 162 mg, Oral, Daily, Start at 12 weeks gestation.  At 36 weeks gestation, decrease to one 81mg tablet daily.    blood sugar diagnostic (TRUE METRIX GLUCOSE TEST STRIP) Strp Use 1 strip to check glucose via meter four times daily    folic acid (FOLVITE) 1 mg, Oral, Daily    HumuLIN N NPH U-100 Insulin 12 Units, Subcutaneous, Nightly    insulin lispro  "(HUMALOG U-100 INSULIN) 100 unit/mL Crtg Use 1 unit of Humalog for every 8 mg of bs over 140    insulin lispro 100 unit/mL injection     insulin syringe-needle U-100 0.5 mL 31 gauge x 5/16" Syrg 1 Syringe, Misc.(Non-Drug; Combo Route), Nightly    lancets Misc To check BG 4 times daily, to use with insurance preferred meter. Check blood glucose 4 times/day: every morning before you eat (fasting) as well as 1 hour after each meal (breakfast, lunch, dinner). Record results for MD to review.    levETIRAcetam (KEPPRA) 750 mg, Oral, 2 times daily    magnesium oxide (MAG-OX) 400 mg, Oral, Daily, For the prevention of headaches    metFORMIN (GLUCOPHAGE) 1,000 mg, Oral, 2 times daily with meals    prenatal vit no.130-iron-folic (PRENATAL VITAMIN) 27 mg iron- 800 mcg Tab 1 tablet, Oral, Daily       Review of Systems   12 point review of systems conducted, negative except as stated in the history of present illness. See HPI for details.      Objective:     Visit Vitals  LMP 2024 (Approximate)        Physical Exam  Vitals and nursing note reviewed.   Constitutional:       Appearance: Normal appearance.   HENT:      Head: Normocephalic and atraumatic.   Cardiovascular:      Rate and Rhythm: Normal rate and regular rhythm.   Pulmonary:      Effort: Pulmonary effort is normal. No respiratory distress.      Breath sounds: Normal breath sounds.   Abdominal:      Palpations: Abdomen is soft.      Tenderness: There is no abdominal tenderness.   Musculoskeletal:      Right lower leg: No edema.      Left lower leg: No edema.   Neurological:      Mental Status: Arlen is alert and oriented to person, place, and time.   Psychiatric:         Mood and Affect: Mood normal.         Behavior: Behavior normal.         Thought Content: Thought content normal.         Judgment: Judgment normal.         Assessment/Plan:     25 y.o.  female with IUP at 36w4d    Seizure disorder   Continued excessive fetal growth with an EFW of 3572 g at " the 87% and the AC at the >99% (>5 weeks ahead) on 3/6/2025.  AFV is normal.  BPP 8/ on 2025     The risks of seizure outweigh any risk of medication and advised her to continue current medication regimen (Keppra 750 mg BID), and continue folic acid 1mg daily. In the future, recommend folic acid supplementation with 4mg PO daily for 1-3 months prior to conception and throughout first trimester.        If she has any seizures, she needs to report that immediately.  It is also recommended to avoid driving or operating heavy/hazardous equipment until 6 months post last seizure.    It is recommended to optimize antiepileptic medications by monitoring levels under the guidance of neurology.  She was advised to maintain follow-up with neurology and continue medication as directed. Advised to keep follow-up with Dr. High as planned.    Fetal surveillance as below.    Breastfeeding can be at the patient's discretion.  Most studies have found that the benefits of breast feeding outweigh the risks of medication exposure. The patient was counseled to avoid triggers (sleep deprivation) and to ensure additional supervision with feeding, changing, and bathing baby after birth. Consideration of potential interaction between her AED and desired birth control method should be reviewed in the third trimester and postpartum (by the patient's primary OB provider).        Type 2 diabetes   Risks associated with diabetes in pregnancy include higher risk for polyhydramnios, fetal macrosomia and  metabolic complications (hypoglycemia, hyperbilirubinemia, hypocalcemia, erythema).     Continue 2200 calorie ADA diet.     Log reviewed.  Importance of strict diet was emphasized.  She was advised to adjust dose of metformin to 500 mg twice daily, and adjust dose of insulin to 22 units of NPH in the morning, and 100 units of NPH at bedtime. She will continue 1 unit of Humalog for every 8 mg of sugar over 140 as needed.     She was  advised to continue to check the glucose 4 times a day, and bring log to each appointment.     Low dose aspirin as discussed.    Reviewed fetal kick count instructions.      Noncompliance  Reviewed the importance of continued care and follow-up as directed with each provider. Reviewed the risks of uncontrolled diabetes and her additional comorbidities in pregnancy and the importance of continued care for her pregnancy complications as above, as well as the risk of FDIU.        Bipolar disorder   Previously discussed the risks of untreated psychiatric disease as well as medication specific risks in pregnancy.     Recommended restarting a mood stabilizer.   She was again reminded to have her mental health provider call us if assistance is needed and restarting a mood stabilizer.  She was advised to continue close follow up with her primary psychiatric provider throughout pregnancy and postpartum period. Advised her to report any worsening symptoms at anytime.    After delivery, she should have a follow-up appointment within 1-2 weeks with both her psychiatric provider as well as an early postpartum visit for a mood check, especially with her reported history of postpartum psychosis.  It would be reasonable to have a discussion about the benefits of breast feeding versus the potential risks of medication exposure in the breast milk.      Elevated BMI with excessive weight gain  There is no height or weight on file to calculate BMI. With continued excessive gain of around 20 lb in the last month, she was advised to Decrease caloric intake and avoid further excessive weight. Excess weight gain would be associated with gestational hypertension,  worsening diabetes and adverse  outcomes, including fetal demise in utero.    Reviewed importance of FKC 3/day and prn with instructions to immediately report any decreased fetal movement.    It is important to lose weight after the pregnancy is over, especially before a  future pregnancy. Breastfeeding may be an important tool in reducing the postpartum weight retention. Fetal risks were discussed with short term risk of fetal/ obesity and long term risk of adolescent component of metabolic syndrome.      Previous  delivery  She had cervical incompetence ruled out previously.  Expectant management. PTL precautions reviewed.      Short interval pregnancy  A short interpregnancy interval is defined as an interval between the delivery of one pregnancy and the conception of a subsequent pregnancy. Most adverse pregnancy outcomes are associated with an IPI of < 6 months. A short IPI is associated with increased risk of maternal anemia, PPROM/PTB, delivery of a SGA infant, and placental abruption. Additionally, the risk of recurrent preeclampsia is higher with an IPI of < 12 months. Maternal anemia due to iron deficiency should be corrected, if present. Additionally, the risk of uterine rupture is higher in women with a short interdelivery interval (< 18 months- birth to birth interval). Additionally,risk of TOLAC failure is higher and therefore, women should be counseled accordingly.      History of shoulder dystocia  She is aware of risk of recurrence.  Reviewed the importance of optimal glucose control to reduce the risk of recurrence.      Labile blood pressure x2 without history of CHTN  Vitals:    25 1448 25 1450   BP: (!) 145/86 139/88    She also had labile blood pressure in primary OB office on 3/10/2025.    Patient is asymptomatic.  Advised her that if blood pressure is persistently elevated it maybe concerning for development of gestational hypertension/preeclampsia.     With type 2 diabetes with suboptimal compliance and intermittent significant elevated blood sugars, and multiple labile blood pressure readings concerning for development of gestational hypertension/preeclampsia, recommend admission to the hospital at this time with for preeclampsia  labs and blood pressure monitoring, and proceeding with delivery as risks of prematurity are outweighed by risks of continued pregnancy at this time.  Discussed with Dr. Payne.  Patient was sent to the hospital where she will have preeclampsia labs and if stable get delivered in the morning.  I adjusted bedtime dose of NPH to 100 units.    No follow-ups on file.     Future Appointments   Date Time Provider Department Center   3/13/2025  2:30 PM Nolan Juárez MD [facility]   3/13/2025  2:30 PM ROOM 2, [facility]   3/17/2025  1:00 PM Isma Payne MD [facility]   4/24/2025  1:00 PM Isma Payne MD [facility]      Patient was evaluated and examined by Dr. Juárez. JAYCE Chan, helped in pre charting of part of note.    This note was created with the assistance of [employer] voice recognition software. There may be transcription errors as a result of using this technology, however minimal. Effort has been made to ensure accuracy of transcription, but any obvious errors or omissions should be clarified with the author of the document.

## 2025-03-12 ENCOUNTER — TELEPHONE (OUTPATIENT)
Dept: MATERNAL FETAL MEDICINE | Facility: CLINIC | Age: 25
End: 2025-03-12
Payer: MEDICAID

## 2025-03-12 ENCOUNTER — ANESTHESIA EVENT (OUTPATIENT)
Dept: OBSTETRICS AND GYNECOLOGY | Facility: HOSPITAL | Age: 25
End: 2025-03-12
Payer: MEDICAID

## 2025-03-13 ENCOUNTER — OFFICE VISIT (OUTPATIENT)
Dept: MATERNAL FETAL MEDICINE | Facility: CLINIC | Age: 25
End: 2025-03-13
Payer: MEDICAID

## 2025-03-13 ENCOUNTER — HOSPITAL ENCOUNTER (INPATIENT)
Facility: HOSPITAL | Age: 25
LOS: 5 days | Discharge: HOME | End: 2025-03-18
Attending: STUDENT IN AN ORGANIZED HEALTH CARE EDUCATION/TRAINING PROGRAM | Admitting: STUDENT IN AN ORGANIZED HEALTH CARE EDUCATION/TRAINING PROGRAM
Payer: MEDICAID

## 2025-03-13 ENCOUNTER — PROCEDURE VISIT (OUTPATIENT)
Dept: MATERNAL FETAL MEDICINE | Facility: CLINIC | Age: 25
End: 2025-03-13
Payer: MEDICAID

## 2025-03-13 VITALS
HEART RATE: 82 BPM | DIASTOLIC BLOOD PRESSURE: 88 MMHG | BODY MASS INDEX: 38.99 KG/M2 | SYSTOLIC BLOOD PRESSURE: 139 MMHG | WEIGHT: 234 LBS | HEIGHT: 65 IN

## 2025-03-13 DIAGNOSIS — O16.9 ELEVATED BLOOD PRESSURE AFFECTING PREGNANCY, ANTEPARTUM: ICD-10-CM

## 2025-03-13 DIAGNOSIS — E66.01 SEVERE OBESITY DUE TO EXCESS CALORIES AFFECTING PREGNANCY IN THIRD TRIMESTER: ICD-10-CM

## 2025-03-13 DIAGNOSIS — O16.9 ELEVATED BLOOD PRESSURE AFFECTING PREGNANCY, ANTEPARTUM: Primary | ICD-10-CM

## 2025-03-13 DIAGNOSIS — O99.353 SEIZURE DISORDER DURING PREGNANCY IN THIRD TRIMESTER: ICD-10-CM

## 2025-03-13 DIAGNOSIS — G40.909 SEIZURE DISORDER DURING PREGNANCY IN THIRD TRIMESTER: ICD-10-CM

## 2025-03-13 DIAGNOSIS — Z98.891 S/P C-SECTION: ICD-10-CM

## 2025-03-13 DIAGNOSIS — O99.213 SEVERE OBESITY DUE TO EXCESS CALORIES AFFECTING PREGNANCY IN THIRD TRIMESTER: ICD-10-CM

## 2025-03-13 DIAGNOSIS — R03.0 ELEVATED BLOOD PRESSURE READING: ICD-10-CM

## 2025-03-13 DIAGNOSIS — O09.219 PREVIOUS PRETERM DELIVERY, ANTEPARTUM: ICD-10-CM

## 2025-03-13 DIAGNOSIS — O24.113 PRE-EXISTING TYPE 2 DIABETES MELLITUS DURING PREGNANCY IN THIRD TRIMESTER: Primary | ICD-10-CM

## 2025-03-13 DIAGNOSIS — O24.113 PRE-EXISTING TYPE 2 DIABETES MELLITUS DURING PREGNANCY IN THIRD TRIMESTER: ICD-10-CM

## 2025-03-13 LAB
ALBUMIN SERPL-MCNC: 2.7 G/DL (ref 3.5–5)
ALBUMIN/GLOB SERPL: 0.7 RATIO (ref 1.1–2)
ALP SERPL-CCNC: 180 UNIT/L (ref 40–150)
ALT SERPL-CCNC: 7 UNIT/L (ref 0–55)
ANION GAP SERPL CALC-SCNC: 14 MEQ/L
AST SERPL-CCNC: 20 UNIT/L (ref 5–34)
BASOPHILS # BLD AUTO: 0.08 X10(3)/MCL
BASOPHILS NFR BLD AUTO: 0.9 %
BILIRUB SERPL-MCNC: 0.9 MG/DL
BUN SERPL-MCNC: 3.5 MG/DL (ref 8.9–20.6)
CALCIUM SERPL-MCNC: 8.5 MG/DL (ref 8.4–10.2)
CHLORIDE SERPL-SCNC: 108 MMOL/L (ref 98–107)
CO2 SERPL-SCNC: 16 MMOL/L (ref 22–29)
CREAT SERPL-MCNC: 0.61 MG/DL (ref 0.72–1.25)
CREAT/UREA NIT SERPL: 6
EOSINOPHIL # BLD AUTO: 0.14 X10(3)/MCL (ref 0–0.9)
EOSINOPHIL NFR BLD AUTO: 1.6 %
ERYTHROCYTE [DISTWIDTH] IN BLOOD BY AUTOMATED COUNT: 18.5 % (ref 11.5–17)
GFR SERPLBLD CREATININE-BSD FMLA CKD-EPI: >60 ML/MIN/1.73/M2
GLOBULIN SER-MCNC: 3.7 GM/DL (ref 2.4–3.5)
GLUCOSE SERPL-MCNC: 91 MG/DL (ref 74–100)
GLUCOSE SERPL-MCNC: 92 MG/DL (ref 70–110)
GROUP & RH: NORMAL
HCT VFR BLD AUTO: 26 % (ref 42–52)
HGB BLD-MCNC: 7.4 G/DL (ref 14–18)
IMM GRANULOCYTES # BLD AUTO: 0.1 X10(3)/MCL (ref 0–0.04)
IMM GRANULOCYTES NFR BLD AUTO: 1.2 %
INDIRECT COOMBS: NORMAL
LDH SERPL-CCNC: 267 U/L (ref 125–220)
LYMPHOCYTES # BLD AUTO: 1.64 X10(3)/MCL (ref 0.6–4.6)
LYMPHOCYTES NFR BLD AUTO: 19.2 %
MCH RBC QN AUTO: 20.5 PG (ref 27–31)
MCHC RBC AUTO-ENTMCNC: 28.5 G/DL (ref 33–36)
MCV RBC AUTO: 72 FL (ref 80–94)
MONOCYTES # BLD AUTO: 0.46 X10(3)/MCL (ref 0.1–1.3)
MONOCYTES NFR BLD AUTO: 5.4 %
NEUTROPHILS # BLD AUTO: 6.14 X10(3)/MCL (ref 2.1–9.2)
NEUTROPHILS NFR BLD AUTO: 71.7 %
NRBC BLD AUTO-RTO: 2.3 %
PLATELET # BLD AUTO: 301 X10(3)/MCL (ref 130–400)
PMV BLD AUTO: 10.1 FL (ref 7.4–10.4)
POCT GLUCOSE: 138 MG/DL (ref 70–110)
POTASSIUM SERPL-SCNC: 3.6 MMOL/L (ref 3.5–5.1)
PROT SERPL-MCNC: 6.4 GM/DL (ref 6.4–8.3)
RBC # BLD AUTO: 3.61 X10(6)/MCL (ref 4.7–6.1)
SODIUM SERPL-SCNC: 138 MMOL/L (ref 136–145)
SPECIMEN OUTDATE: NORMAL
T PALLIDUM AB SER QL: NONREACTIVE
URATE SERPL-MCNC: 8.1 MG/DL (ref 3.5–7.2)
WBC # BLD AUTO: 8.56 X10(3)/MCL (ref 4.5–11.5)

## 2025-03-13 PROCEDURE — 63600175 PHARM REV CODE 636 W HCPCS: Performed by: STUDENT IN AN ORGANIZED HEALTH CARE EDUCATION/TRAINING PROGRAM

## 2025-03-13 PROCEDURE — 84550 ASSAY OF BLOOD/URIC ACID: CPT | Performed by: STUDENT IN AN ORGANIZED HEALTH CARE EDUCATION/TRAINING PROGRAM

## 2025-03-13 PROCEDURE — 80053 COMPREHEN METABOLIC PANEL: CPT | Performed by: STUDENT IN AN ORGANIZED HEALTH CARE EDUCATION/TRAINING PROGRAM

## 2025-03-13 PROCEDURE — 86850 RBC ANTIBODY SCREEN: CPT | Performed by: STUDENT IN AN ORGANIZED HEALTH CARE EDUCATION/TRAINING PROGRAM

## 2025-03-13 PROCEDURE — 86780 TREPONEMA PALLIDUM: CPT | Performed by: STUDENT IN AN ORGANIZED HEALTH CARE EDUCATION/TRAINING PROGRAM

## 2025-03-13 PROCEDURE — 85025 COMPLETE CBC W/AUTO DIFF WBC: CPT | Performed by: STUDENT IN AN ORGANIZED HEALTH CARE EDUCATION/TRAINING PROGRAM

## 2025-03-13 PROCEDURE — 25000003 PHARM REV CODE 250: Performed by: STUDENT IN AN ORGANIZED HEALTH CARE EDUCATION/TRAINING PROGRAM

## 2025-03-13 PROCEDURE — 11000001 HC ACUTE MED/SURG PRIVATE ROOM

## 2025-03-13 PROCEDURE — 83615 LACTATE (LD) (LDH) ENZYME: CPT | Performed by: STUDENT IN AN ORGANIZED HEALTH CARE EDUCATION/TRAINING PROGRAM

## 2025-03-13 RX ORDER — LEVETIRACETAM 100 MG/ML
750 SOLUTION ORAL 2 TIMES DAILY
Status: DISCONTINUED | OUTPATIENT
Start: 2025-03-13 | End: 2025-03-18 | Stop reason: HOSPADM

## 2025-03-13 RX ORDER — SODIUM CITRATE AND CITRIC ACID MONOHYDRATE 334; 500 MG/5ML; MG/5ML
30 SOLUTION ORAL
Status: DISCONTINUED | OUTPATIENT
Start: 2025-03-13 | End: 2025-03-18 | Stop reason: HOSPADM

## 2025-03-13 RX ORDER — OXYTOCIN-SODIUM CHLORIDE 0.9% IV SOLN 30 UNIT/500ML 30-0.9/5 UT/ML-%
10 SOLUTION INTRAVENOUS ONCE AS NEEDED
Status: CANCELLED | OUTPATIENT
Start: 2025-03-13 | End: 2036-08-09

## 2025-03-13 RX ORDER — METFORMIN HYDROCHLORIDE 500 MG/1
500 TABLET ORAL 2 TIMES DAILY WITH MEALS
Status: DISCONTINUED | OUTPATIENT
Start: 2025-03-13 | End: 2025-03-18 | Stop reason: HOSPADM

## 2025-03-13 RX ORDER — MISOPROSTOL 100 UG/1
800 TABLET ORAL
Status: DISCONTINUED | OUTPATIENT
Start: 2025-03-13 | End: 2025-03-18 | Stop reason: HOSPADM

## 2025-03-13 RX ORDER — OXYTOCIN-SODIUM CHLORIDE 0.9% IV SOLN 30 UNIT/500ML 30-0.9/5 UT/ML-%
95 SOLUTION INTRAVENOUS CONTINUOUS PRN
Status: CANCELLED | OUTPATIENT
Start: 2025-03-13

## 2025-03-13 RX ORDER — SODIUM CHLORIDE, SODIUM LACTATE, POTASSIUM CHLORIDE, CALCIUM CHLORIDE 600; 310; 30; 20 MG/100ML; MG/100ML; MG/100ML; MG/100ML
INJECTION, SOLUTION INTRAVENOUS CONTINUOUS
Status: DISCONTINUED | OUTPATIENT
Start: 2025-03-14 | End: 2025-03-14

## 2025-03-13 RX ORDER — METHYLERGONOVINE MALEATE 0.2 MG/ML
200 INJECTION INTRAVENOUS
Status: DISCONTINUED | OUTPATIENT
Start: 2025-03-13 | End: 2025-03-18 | Stop reason: HOSPADM

## 2025-03-13 RX ORDER — FAMOTIDINE 10 MG/ML
20 INJECTION, SOLUTION INTRAVENOUS
Status: DISCONTINUED | OUTPATIENT
Start: 2025-03-13 | End: 2025-03-18 | Stop reason: HOSPADM

## 2025-03-13 RX ORDER — CARBOPROST TROMETHAMINE 250 UG/ML
250 INJECTION, SOLUTION INTRAMUSCULAR
Status: DISCONTINUED | OUTPATIENT
Start: 2025-03-13 | End: 2025-03-18 | Stop reason: HOSPADM

## 2025-03-13 RX ADMIN — HUMAN INSULIN 100 UNITS: 100 INJECTION, SUSPENSION SUBCUTANEOUS at 09:03

## 2025-03-13 RX ADMIN — SODIUM CHLORIDE 125 MG: 9 INJECTION, SOLUTION INTRAVENOUS at 06:03

## 2025-03-13 RX ADMIN — METFORMIN HYDROCHLORIDE 500 MG: 500 TABLET, FILM COATED ORAL at 07:03

## 2025-03-13 RX ADMIN — LEVETIRACETAM 750 MG: 100 SOLUTION ORAL at 09:03

## 2025-03-14 LAB
POCT GLUCOSE: 108 MG/DL (ref 70–110)
POCT GLUCOSE: 67 MG/DL (ref 70–110)
POCT GLUCOSE: 68 MG/DL (ref 70–110)
POCT GLUCOSE: 73 MG/DL (ref 70–110)
POCT GLUCOSE: 88 MG/DL (ref 70–110)

## 2025-03-14 PROCEDURE — 86923 COMPATIBILITY TEST ELECTRIC: CPT | Mod: 91 | Performed by: STUDENT IN AN ORGANIZED HEALTH CARE EDUCATION/TRAINING PROGRAM

## 2025-03-14 PROCEDURE — 30233N1 TRANSFUSION OF NONAUTOLOGOUS RED BLOOD CELLS INTO PERIPHERAL VEIN, PERCUTANEOUS APPROACH: ICD-10-PCS | Performed by: OBSTETRICS & GYNECOLOGY

## 2025-03-14 PROCEDURE — 63600175 PHARM REV CODE 636 W HCPCS

## 2025-03-14 PROCEDURE — 51702 INSERT TEMP BLADDER CATH: CPT

## 2025-03-14 PROCEDURE — 37000009 HC ANESTHESIA EA ADD 15 MINS: Performed by: STUDENT IN AN ORGANIZED HEALTH CARE EDUCATION/TRAINING PROGRAM

## 2025-03-14 PROCEDURE — 25000003 PHARM REV CODE 250: Performed by: STUDENT IN AN ORGANIZED HEALTH CARE EDUCATION/TRAINING PROGRAM

## 2025-03-14 PROCEDURE — 27200918 HC ALEXIS O RING

## 2025-03-14 PROCEDURE — 63600175 PHARM REV CODE 636 W HCPCS: Performed by: STUDENT IN AN ORGANIZED HEALTH CARE EDUCATION/TRAINING PROGRAM

## 2025-03-14 PROCEDURE — 25000003 PHARM REV CODE 250

## 2025-03-14 PROCEDURE — 36430 TRANSFUSION BLD/BLD COMPNT: CPT

## 2025-03-14 PROCEDURE — D9220A PRA ANESTHESIA: Mod: CRNA,,,

## 2025-03-14 PROCEDURE — D9220A PRA ANESTHESIA: Mod: ANES,,, | Performed by: STUDENT IN AN ORGANIZED HEALTH CARE EDUCATION/TRAINING PROGRAM

## 2025-03-14 PROCEDURE — 37000008 HC ANESTHESIA 1ST 15 MINUTES: Performed by: STUDENT IN AN ORGANIZED HEALTH CARE EDUCATION/TRAINING PROGRAM

## 2025-03-14 PROCEDURE — 36004724 HC OB OR TIME LEV III - 1ST 15 MIN: Performed by: STUDENT IN AN ORGANIZED HEALTH CARE EDUCATION/TRAINING PROGRAM

## 2025-03-14 PROCEDURE — 11000001 HC ACUTE MED/SURG PRIVATE ROOM

## 2025-03-14 PROCEDURE — 25000003 PHARM REV CODE 250: Performed by: OBSTETRICS & GYNECOLOGY

## 2025-03-14 PROCEDURE — 63600175 PHARM REV CODE 636 W HCPCS: Mod: JZ,TB | Performed by: STUDENT IN AN ORGANIZED HEALTH CARE EDUCATION/TRAINING PROGRAM

## 2025-03-14 PROCEDURE — 36004725 HC OB OR TIME LEV III - EA ADD 15 MIN: Performed by: STUDENT IN AN ORGANIZED HEALTH CARE EDUCATION/TRAINING PROGRAM

## 2025-03-14 PROCEDURE — 59025 FETAL NON-STRESS TEST: CPT

## 2025-03-14 PROCEDURE — 27000492 HC SLEEVE, SCD T/L

## 2025-03-14 PROCEDURE — P9016 RBC LEUKOCYTES REDUCED: HCPCS | Performed by: STUDENT IN AN ORGANIZED HEALTH CARE EDUCATION/TRAINING PROGRAM

## 2025-03-14 PROCEDURE — 71000033 HC RECOVERY, INTIAL HOUR: Performed by: STUDENT IN AN ORGANIZED HEALTH CARE EDUCATION/TRAINING PROGRAM

## 2025-03-14 PROCEDURE — 27201423 OPTIME MED/SURG SUP & DEVICES STERILE SUPPLY: Performed by: STUDENT IN AN ORGANIZED HEALTH CARE EDUCATION/TRAINING PROGRAM

## 2025-03-14 RX ORDER — ADHESIVE BANDAGE
30 BANDAGE TOPICAL 2 TIMES DAILY PRN
Status: DISCONTINUED | OUTPATIENT
Start: 2025-03-15 | End: 2025-03-18 | Stop reason: HOSPADM

## 2025-03-14 RX ORDER — ONDANSETRON 4 MG/1
8 TABLET, ORALLY DISINTEGRATING ORAL EVERY 8 HOURS PRN
Status: DISCONTINUED | OUTPATIENT
Start: 2025-03-14 | End: 2025-03-18 | Stop reason: HOSPADM

## 2025-03-14 RX ORDER — CARBOPROST TROMETHAMINE 250 UG/ML
250 INJECTION, SOLUTION INTRAMUSCULAR
Status: DISCONTINUED | OUTPATIENT
Start: 2025-03-14 | End: 2025-03-18 | Stop reason: HOSPADM

## 2025-03-14 RX ORDER — ACETAMINOPHEN 500 MG
1000 TABLET ORAL EVERY 6 HOURS
Qty: 120 TABLET | Refills: 0 | Status: SHIPPED | OUTPATIENT
Start: 2025-03-14 | End: 2026-03-14

## 2025-03-14 RX ORDER — DOCUSATE SODIUM 100 MG/1
100 CAPSULE, LIQUID FILLED ORAL 2 TIMES DAILY
Qty: 60 CAPSULE | Refills: 0 | Status: SHIPPED | OUTPATIENT
Start: 2025-03-14 | End: 2026-03-14

## 2025-03-14 RX ORDER — CEFAZOLIN SODIUM 1 G/3ML
INJECTION, POWDER, FOR SOLUTION INTRAMUSCULAR; INTRAVENOUS
Status: DISCONTINUED | OUTPATIENT
Start: 2025-03-14 | End: 2025-03-14

## 2025-03-14 RX ORDER — MISOPROSTOL 100 UG/1
800 TABLET ORAL ONCE AS NEEDED
Status: COMPLETED | OUTPATIENT
Start: 2025-03-14 | End: 2025-03-14

## 2025-03-14 RX ORDER — DIPHENHYDRAMINE HCL 25 MG
25 CAPSULE ORAL EVERY 4 HOURS PRN
Status: DISCONTINUED | OUTPATIENT
Start: 2025-03-14 | End: 2025-03-18 | Stop reason: HOSPADM

## 2025-03-14 RX ORDER — BUPIVACAINE HYDROCHLORIDE 7.5 MG/ML
INJECTION, SOLUTION EPIDURAL; RETROBULBAR
Status: COMPLETED | OUTPATIENT
Start: 2025-03-14 | End: 2025-03-14

## 2025-03-14 RX ORDER — MORPHINE SULFATE 4 MG/ML
4 INJECTION, SOLUTION INTRAMUSCULAR; INTRAVENOUS
Refills: 0 | Status: DISCONTINUED | OUTPATIENT
Start: 2025-03-14 | End: 2025-03-18 | Stop reason: HOSPADM

## 2025-03-14 RX ORDER — LABETALOL HCL 20 MG/4 ML
40 SYRINGE (ML) INTRAVENOUS ONCE AS NEEDED
Status: DISCONTINUED | OUTPATIENT
Start: 2025-03-14 | End: 2025-03-18 | Stop reason: HOSPADM

## 2025-03-14 RX ORDER — METHYLERGONOVINE MALEATE 0.2 MG/ML
200 INJECTION INTRAVENOUS ONCE AS NEEDED
Status: DISCONTINUED | OUTPATIENT
Start: 2025-03-14 | End: 2025-03-18 | Stop reason: HOSPADM

## 2025-03-14 RX ORDER — IBUPROFEN 600 MG/1
600 TABLET ORAL EVERY 6 HOURS
Qty: 60 TABLET | Refills: 0 | Status: SHIPPED | OUTPATIENT
Start: 2025-03-14

## 2025-03-14 RX ORDER — DOCUSATE SODIUM 100 MG/1
200 CAPSULE, LIQUID FILLED ORAL 2 TIMES DAILY
Status: DISCONTINUED | OUTPATIENT
Start: 2025-03-14 | End: 2025-03-18 | Stop reason: HOSPADM

## 2025-03-14 RX ORDER — DEXAMETHASONE SODIUM PHOSPHATE 4 MG/ML
INJECTION, SOLUTION INTRA-ARTICULAR; INTRALESIONAL; INTRAMUSCULAR; INTRAVENOUS; SOFT TISSUE
Status: DISCONTINUED | OUTPATIENT
Start: 2025-03-14 | End: 2025-03-14

## 2025-03-14 RX ORDER — OXYTOCIN 10 [USP'U]/ML
10 INJECTION, SOLUTION INTRAMUSCULAR; INTRAVENOUS ONCE AS NEEDED
Status: DISCONTINUED | OUTPATIENT
Start: 2025-03-14 | End: 2025-03-18 | Stop reason: HOSPADM

## 2025-03-14 RX ORDER — TRANEXAMIC ACID 100 MG/ML
INJECTION, SOLUTION INTRAVENOUS
Status: DISPENSED
Start: 2025-03-14 | End: 2025-03-15

## 2025-03-14 RX ORDER — LABETALOL HCL 20 MG/4 ML
20 SYRINGE (ML) INTRAVENOUS ONCE AS NEEDED
Status: DISCONTINUED | OUTPATIENT
Start: 2025-03-14 | End: 2025-03-18 | Stop reason: HOSPADM

## 2025-03-14 RX ORDER — ONDANSETRON HYDROCHLORIDE 2 MG/ML
INJECTION, SOLUTION INTRAVENOUS
Status: DISCONTINUED | OUTPATIENT
Start: 2025-03-14 | End: 2025-03-14

## 2025-03-14 RX ORDER — DIPHENOXYLATE HYDROCHLORIDE AND ATROPINE SULFATE 2.5; .025 MG/1; MG/1
2 TABLET ORAL EVERY 6 HOURS PRN
Status: DISCONTINUED | OUTPATIENT
Start: 2025-03-14 | End: 2025-03-18 | Stop reason: HOSPADM

## 2025-03-14 RX ORDER — OXYCODONE HYDROCHLORIDE 5 MG/1
5 TABLET ORAL EVERY 4 HOURS PRN
Qty: 28 TABLET | Refills: 0 | Status: SHIPPED | OUTPATIENT
Start: 2025-03-14

## 2025-03-14 RX ORDER — AMOXICILLIN 250 MG
1 CAPSULE ORAL NIGHTLY PRN
Status: DISCONTINUED | OUTPATIENT
Start: 2025-03-14 | End: 2025-03-18 | Stop reason: HOSPADM

## 2025-03-14 RX ORDER — MISOPROSTOL 100 UG/1
800 TABLET ORAL ONCE AS NEEDED
Status: DISCONTINUED | OUTPATIENT
Start: 2025-03-14 | End: 2025-03-18 | Stop reason: HOSPADM

## 2025-03-14 RX ORDER — OXYCODONE HYDROCHLORIDE 5 MG/1
5 TABLET ORAL EVERY 4 HOURS PRN
Refills: 0 | Status: DISCONTINUED | OUTPATIENT
Start: 2025-03-14 | End: 2025-03-18 | Stop reason: HOSPADM

## 2025-03-14 RX ORDER — OXYTOCIN-SODIUM CHLORIDE 0.9% IV SOLN 30 UNIT/500ML 30-0.9/5 UT/ML-%
95 SOLUTION INTRAVENOUS ONCE AS NEEDED
Status: DISCONTINUED | OUTPATIENT
Start: 2025-03-14 | End: 2025-03-18 | Stop reason: HOSPADM

## 2025-03-14 RX ORDER — OXYTOCIN-SODIUM CHLORIDE 0.9% IV SOLN 30 UNIT/500ML 30-0.9/5 UT/ML-%
95 SOLUTION INTRAVENOUS CONTINUOUS PRN
Status: DISCONTINUED | OUTPATIENT
Start: 2025-03-14 | End: 2025-03-18 | Stop reason: HOSPADM

## 2025-03-14 RX ORDER — ACETAMINOPHEN 500 MG
1000 TABLET ORAL EVERY 6 HOURS
Status: DISCONTINUED | OUTPATIENT
Start: 2025-03-14 | End: 2025-03-14

## 2025-03-14 RX ORDER — MISOPROSTOL 100 UG/1
800 TABLET ORAL ONCE
Status: DISCONTINUED | OUTPATIENT
Start: 2025-03-14 | End: 2025-03-18 | Stop reason: HOSPADM

## 2025-03-14 RX ORDER — HYDRALAZINE HYDROCHLORIDE 20 MG/ML
10 INJECTION INTRAMUSCULAR; INTRAVENOUS ONCE AS NEEDED
Status: DISCONTINUED | OUTPATIENT
Start: 2025-03-14 | End: 2025-03-18 | Stop reason: HOSPADM

## 2025-03-14 RX ORDER — CHLORDIAZEPOXIDE AND AMITRIPTYLINE HYDROCHLORIDE 5; 14 MG/1; MG/1
1 TABLET, FILM COATED ORAL 2 TIMES DAILY
Status: DISCONTINUED | OUTPATIENT
Start: 2025-03-14 | End: 2025-03-14

## 2025-03-14 RX ORDER — OXYCODONE HYDROCHLORIDE 5 MG/1
10 TABLET ORAL EVERY 4 HOURS PRN
Refills: 0 | Status: DISCONTINUED | OUTPATIENT
Start: 2025-03-14 | End: 2025-03-18 | Stop reason: HOSPADM

## 2025-03-14 RX ORDER — MORPHINE SULFATE 1 MG/ML
INJECTION, SOLUTION EPIDURAL; INTRATHECAL; INTRAVENOUS
Status: DISCONTINUED | OUTPATIENT
Start: 2025-03-14 | End: 2025-03-14

## 2025-03-14 RX ORDER — CARBOPROST TROMETHAMINE 250 UG/ML
250 INJECTION, SOLUTION INTRAMUSCULAR ONCE
Status: DISCONTINUED | OUTPATIENT
Start: 2025-03-14 | End: 2025-03-18 | Stop reason: HOSPADM

## 2025-03-14 RX ORDER — OXYTOCIN-SODIUM CHLORIDE 0.9% IV SOLN 30 UNIT/500ML 30-0.9/5 UT/ML-%
SOLUTION INTRAVENOUS ONCE AS NEEDED
Status: CANCELLED | OUTPATIENT
Start: 2025-03-14 | End: 2036-08-10

## 2025-03-14 RX ORDER — AMITRIPTYLINE HYDROCHLORIDE 10 MG/1
10 TABLET, FILM COATED ORAL 2 TIMES DAILY
Status: DISCONTINUED | OUTPATIENT
Start: 2025-03-14 | End: 2025-03-18 | Stop reason: HOSPADM

## 2025-03-14 RX ORDER — SODIUM CHLORIDE, SODIUM LACTATE, POTASSIUM CHLORIDE, CALCIUM CHLORIDE 600; 310; 30; 20 MG/100ML; MG/100ML; MG/100ML; MG/100ML
INJECTION, SOLUTION INTRAVENOUS CONTINUOUS PRN
Status: DISCONTINUED | OUTPATIENT
Start: 2025-03-14 | End: 2025-03-14

## 2025-03-14 RX ORDER — HYDROCODONE BITARTRATE AND ACETAMINOPHEN 500; 5 MG/1; MG/1
TABLET ORAL
Status: DISCONTINUED | OUTPATIENT
Start: 2025-03-14 | End: 2025-03-18 | Stop reason: HOSPADM

## 2025-03-14 RX ORDER — MUPIROCIN 20 MG/G
OINTMENT TOPICAL
Status: DISCONTINUED | OUTPATIENT
Start: 2025-03-14 | End: 2025-03-18 | Stop reason: HOSPADM

## 2025-03-14 RX ORDER — NIFEDIPINE 60 MG/1
60 TABLET, EXTENDED RELEASE ORAL DAILY
Status: DISCONTINUED | OUTPATIENT
Start: 2025-03-14 | End: 2025-03-18 | Stop reason: HOSPADM

## 2025-03-14 RX ORDER — OXYTOCIN-SODIUM CHLORIDE 0.9% IV SOLN 30 UNIT/500ML 30-0.9/5 UT/ML-%
SOLUTION INTRAVENOUS
Status: DISCONTINUED | OUTPATIENT
Start: 2025-03-14 | End: 2025-03-14

## 2025-03-14 RX ORDER — IBUPROFEN 800 MG/1
800 TABLET ORAL EVERY 8 HOURS
Status: DISCONTINUED | OUTPATIENT
Start: 2025-03-15 | End: 2025-03-18 | Stop reason: HOSPADM

## 2025-03-14 RX ORDER — CHLORDIAZEPOXIDE HYDROCHLORIDE 5 MG/1
5 CAPSULE, GELATIN COATED ORAL 2 TIMES DAILY
Status: DISCONTINUED | OUTPATIENT
Start: 2025-03-14 | End: 2025-03-18 | Stop reason: HOSPADM

## 2025-03-14 RX ORDER — PRENATAL WITH FERROUS FUM AND FOLIC ACID 3080; 920; 120; 400; 22; 1.84; 3; 20; 10; 1; 12; 200; 27; 25; 2 [IU]/1; [IU]/1; MG/1; [IU]/1; MG/1; MG/1; MG/1; MG/1; MG/1; MG/1; UG/1; MG/1; MG/1; MG/1; MG/1
1 TABLET ORAL DAILY
Status: DISCONTINUED | OUTPATIENT
Start: 2025-03-14 | End: 2025-03-18 | Stop reason: HOSPADM

## 2025-03-14 RX ORDER — LABETALOL HCL 20 MG/4 ML
80 SYRINGE (ML) INTRAVENOUS ONCE AS NEEDED
Status: DISCONTINUED | OUTPATIENT
Start: 2025-03-14 | End: 2025-03-18 | Stop reason: HOSPADM

## 2025-03-14 RX ORDER — DEXTROSE, SODIUM CHLORIDE, SODIUM LACTATE, POTASSIUM CHLORIDE, AND CALCIUM CHLORIDE 5; .6; .31; .03; .02 G/100ML; G/100ML; G/100ML; G/100ML; G/100ML
INJECTION, SOLUTION INTRAVENOUS CONTINUOUS
Status: DISCONTINUED | OUTPATIENT
Start: 2025-03-14 | End: 2025-03-17

## 2025-03-14 RX ORDER — ACETAMINOPHEN 10 MG/ML
INJECTION, SOLUTION INTRAVENOUS
Status: DISCONTINUED | OUTPATIENT
Start: 2025-03-14 | End: 2025-03-14

## 2025-03-14 RX ORDER — BISACODYL 10 MG/1
10 SUPPOSITORY RECTAL ONCE AS NEEDED
Status: DISCONTINUED | OUTPATIENT
Start: 2025-03-14 | End: 2025-03-18 | Stop reason: HOSPADM

## 2025-03-14 RX ORDER — KETOROLAC TROMETHAMINE 30 MG/ML
30 INJECTION, SOLUTION INTRAMUSCULAR; INTRAVENOUS EVERY 8 HOURS
Status: COMPLETED | OUTPATIENT
Start: 2025-03-14 | End: 2025-03-15

## 2025-03-14 RX ORDER — ENOXAPARIN SODIUM 100 MG/ML
0.5 INJECTION SUBCUTANEOUS
Status: DISCONTINUED | OUTPATIENT
Start: 2025-03-15 | End: 2025-03-18 | Stop reason: HOSPADM

## 2025-03-14 RX ORDER — SIMETHICONE 80 MG
1 TABLET,CHEWABLE ORAL EVERY 6 HOURS PRN
Status: DISCONTINUED | OUTPATIENT
Start: 2025-03-14 | End: 2025-03-18 | Stop reason: HOSPADM

## 2025-03-14 RX ORDER — FENTANYL CITRATE 50 UG/ML
INJECTION, SOLUTION INTRAMUSCULAR; INTRAVENOUS
Status: DISCONTINUED | OUTPATIENT
Start: 2025-03-14 | End: 2025-03-14

## 2025-03-14 RX ORDER — ACETAMINOPHEN 500 MG
1000 TABLET ORAL EVERY 6 HOURS
Status: DISCONTINUED | OUTPATIENT
Start: 2025-03-14 | End: 2025-03-18 | Stop reason: HOSPADM

## 2025-03-14 RX ADMIN — Medication 30 UNITS: at 12:03

## 2025-03-14 RX ADMIN — ONDANSETRON 4 MG: 2 INJECTION INTRAMUSCULAR; INTRAVENOUS at 12:03

## 2025-03-14 RX ADMIN — SODIUM CHLORIDE, SODIUM LACTATE, POTASSIUM CHLORIDE, CALCIUM CHLORIDE AND DEXTROSE MONOHYDRATE 500 ML: 5; 600; 310; 30; 20 INJECTION, SOLUTION INTRAVENOUS at 11:03

## 2025-03-14 RX ADMIN — SODIUM CHLORIDE, POTASSIUM CHLORIDE, SODIUM LACTATE AND CALCIUM CHLORIDE 1000 ML: 600; 310; 30; 20 INJECTION, SOLUTION INTRAVENOUS at 11:03

## 2025-03-14 RX ADMIN — BUPIVACAINE HYDROCHLORIDE 1.7 ML: 7.5 INJECTION, SOLUTION EPIDURAL; RETROBULBAR at 12:03

## 2025-03-14 RX ADMIN — CEFAZOLIN 2 G: 330 INJECTION, POWDER, FOR SOLUTION INTRAMUSCULAR; INTRAVENOUS at 12:03

## 2025-03-14 RX ADMIN — METFORMIN HYDROCHLORIDE 500 MG: 500 TABLET, FILM COATED ORAL at 04:03

## 2025-03-14 RX ADMIN — ACETAMINOPHEN 1000 MG: 10 INJECTION, SOLUTION INTRAVENOUS at 12:03

## 2025-03-14 RX ADMIN — LEVETIRACETAM 750 MG: 100 SOLUTION ORAL at 08:03

## 2025-03-14 RX ADMIN — SODIUM CITRATE AND CITRIC ACID MONOHYDRATE 30 ML: 500; 334 SOLUTION ORAL at 11:03

## 2025-03-14 RX ADMIN — FENTANYL CITRATE 10 MCG: 50 INJECTION, SOLUTION INTRAMUSCULAR; INTRAVENOUS at 12:03

## 2025-03-14 RX ADMIN — NIFEDIPINE 60 MG: 60 TABLET, FILM COATED, EXTENDED RELEASE ORAL at 05:03

## 2025-03-14 RX ADMIN — SODIUM CHLORIDE, POTASSIUM CHLORIDE, SODIUM LACTATE AND CALCIUM CHLORIDE: 600; 310; 30; 20 INJECTION, SOLUTION INTRAVENOUS at 12:03

## 2025-03-14 RX ADMIN — TRANEXAMIC ACID 1000 MG: 100 INJECTION, SOLUTION INTRAVENOUS at 01:03

## 2025-03-14 RX ADMIN — MISOPROSTOL 800 MCG: 100 TABLET ORAL at 01:03

## 2025-03-14 RX ADMIN — SODIUM CHLORIDE: 9 INJECTION, SOLUTION INTRAVENOUS at 12:03

## 2025-03-14 RX ADMIN — DEXAMETHASONE SODIUM PHOSPHATE 8 MG: 4 INJECTION, SOLUTION INTRA-ARTICULAR; INTRALESIONAL; INTRAMUSCULAR; INTRAVENOUS; SOFT TISSUE at 12:03

## 2025-03-14 RX ADMIN — DOCUSATE SODIUM 200 MG: 100 CAPSULE, LIQUID FILLED ORAL at 08:03

## 2025-03-14 RX ADMIN — MORPHINE SULFATE 0.1 MG: 1 INJECTION, SOLUTION EPIDURAL; INTRATHECAL; INTRAVENOUS at 12:03

## 2025-03-14 RX ADMIN — KETOROLAC TROMETHAMINE 30 MG: 30 INJECTION, SOLUTION INTRAMUSCULAR at 04:03

## 2025-03-14 NOTE — PROGRESS NOTES
L&D progress note    Called for PP oozing vaginally. Pt feels well    /76   Pulse 75   Temp 96.8 °F (36 °C)   Resp 20   LMP 06/24/2024 (Approximate)   SpO2 100%   Breastfeeding Unknown     NAD  Well perfused  Nonlabored breathing  NT ND  Bimanual with minimal clots removed due to only 2cm dilated.     Approx 200cc this episode.    Bleeding now improved    A/P:  Pit, cytotec, TXA given  Plan to give Hemabate PRN    Handing off to Dr BERNABE Payne MD  03/14/2025

## 2025-03-14 NOTE — H&P
ante - History & Physical    Chief Complaint: Htn     HPI:      Norma Denise is a 25 y.o.  at 37w0d who presents today for evaluation of above chief complaint.    Pt feels well. Was sent in for gHTN and delivery on 3/14.    rule out pre-e       Patient's last menstrual period was 2024 (approximate).     OB History    Para Term  AB Living   4 2 1 1 1 2   SAB IAB Ectopic Multiple Live Births   1 0 0 0 2      # Outcome Date GA Lbr Fantasma/2nd Weight Sex Type Anes PTL Lv   4 Current            3  24 36w3d  4.14 kg (9 lb 2 oz) M CS-LTranv Gen Y CRESENCIO      Complications: Fetal Intolerance   2 Term 2022 38w0d  3.742 kg (8 lb 4 oz) M Vag-Spont EPI N CRESENCIO   1 SAB 21     SAB   FD       Past Medical History:   Diagnosis Date    Adjustment disorder     Anemia     BEGAN WITH PREGNANCY OF LAST BABY, CURRENT    Anxiety     Asthma     CURRENT    Bipolar disorder 2008    Depression 2008    Diabetes mellitus 2023    TYPE 2 DIABETIC    Excessive fetal growth affecting management of pregnancy in third trimester 2024    History of psychiatric hospitalization 2008    Hx of psychiatric care     Hypertension     ECLAMPSIA WITH LABOR    Postpartum depression     Psychiatric exam requested by authority 2008    Psychiatric problem 2008    Seizure disorder     DIAGNOSED WHEN BORN    Sleep difficulties     Substance abuse     BEEN SOBER FOR 2 YEARS IN FEBRUARY    Suicide attempt     HAPPENS OFTEN, LAST ATTEMPT WAS        Past Surgical History:   Procedure Laterality Date     SECTION N/A 2024    Procedure:  SECTION;  Surgeon: Isma Payne MD;  Location: [facility];  Service: OB/GYN;  Laterality: N/A;       Family History   Problem Relation Name Age of Onset    No Known Problems Paternal Grandfather      No Known Problems Paternal Grandmother      No Known Problems Maternal Grandmother      No Known Problems Maternal  Grandfather      Stroke Father      Diabetes Mother      Bipolar disorder Mother      Schizophrenia Mother      No Known Problems Brother      Bipolar disorder Sister      Schizophrenia Sister      No Known Problems Maternal Aunt      No Known Problems Maternal Uncle      No Known Problems Paternal Aunt      No Known Problems Paternal Uncle      No Known Problems Cousin         Social History     Socioeconomic History    Marital status:     Number of children: 0   Occupational History    Occupation: unemployed   Tobacco Use    Smoking status: Former     Types: Vaping with nicotine, Cigarettes     Start date: 2/5/2020     Quit date: 2022     Years since quitting: 3.2     Passive exposure: Never    Smokeless tobacco: Never   Substance and Sexual Activity    Alcohol use: Never    Drug use: Not Currently     Frequency: 21.0 times per week     Types: Marijuana, Heroin    Sexual activity: Yes     Partners: Male     Birth control/protection: None   Other Topics Concern    Patient feels they ought to cut down on drinking/drug use No    Patient annoyed by others criticizing their drinking/drug use No    Patient has felt bad or guilty about drinking/drug use No    Patient has had a drink/used drugs as an eye opener in the AM No   Social History Narrative    ** Merged History Encounter **         Pt is a 21 year old female who recently broke up with her  Boyfriend, whom she had been living with.   Pt reports that she does not attend school nor does she have a job. Pt states that she completed 11 th grade.     Social Drivers of Health     Financial Resource Strain: Low Risk  (3/13/2025)    Overall Financial Resource Strain (CARDIA)     Difficulty of Paying Living Expenses: Not hard at all   Food Insecurity: No Food Insecurity (3/13/2025)    Hunger Vital Sign     Worried About Running Out of Food in the Last Year: Never true     Ran Out of Food in the Last Year: Never true   Physical Activity: Sufficiently Active  (8/1/2022)    [facility] from [facility]    Exercise Vital Sign     Days of Exercise per Week: 7 days     Minutes of Exercise per Session: 30 min   Stress: No Stress Concern Present (3/13/2025)    [employer] [place] [employer] - Occupational Stress Questionnaire     Feeling of Stress : Not at all   Housing Stability: Low Risk  (3/13/2025)    Housing Stability Vital Sign     Unable to Pay for Housing in the Last Year: No     Homeless in the Last Year: No       Current Medications[1]    Review of patient's allergies indicates:  No Known Allergies      Physical Exam:      /62   Pulse 80   Temp 98.1 °F (36.7 °C)   Resp 18   LMP 06/24/2024 (Approximate)   SpO2 98%   Breastfeeding No   There is no height or weight on file to calculate BMI.     APPEARANCE: Well nourished, well developed, in no acute distress.  PSYCH: Appropriate mood and affect.  CHEST: Normal respiratory effort,  CV: Normal capillary refill.  ABDOMEN: Soft.  No tenderness or masses.    PELVIC:  deferred  EXTREMITIES: No edema       Results:     Results for orders placed or performed during the hospital encounter of 03/13/25   Comprehensive Metabolic Panel    Collection Time: 03/13/25  4:39 PM   Result Value Ref Range    Sodium 138 136 - 145 mmol/L    Potassium 3.6 3.5 - 5.1 mmol/L    Chloride 108 (H) 98 - 107 mmol/L    CO2 16 (L) 22 - 29 mmol/L    Glucose 91 74 - 100 mg/dL    Blood Urea Nitrogen 3.5 (L) 8.9 - 20.6 mg/dL    Creatinine 0.61 (L) 0.72 - 1.25 mg/dL    Calcium 8.5 8.4 - 10.2 mg/dL    Protein Total 6.4 6.4 - 8.3 gm/dL    Albumin 2.7 (L) 3.5 - 5.0 g/dL    Globulin 3.7 (H) 2.4 - 3.5 gm/dL    Albumin/Globulin Ratio 0.7 (L) 1.1 - 2.0 ratio    Bilirubin Total 0.9 <=1.5 mg/dL     (H) 40 - 150 unit/L    ALT 7 0 - 55 unit/L    AST 20 5 - 34 unit/L    eGFR >60 mL/min/1.73/m2    Anion Gap 14.0 mEq/L    BUN/Creatinine Ratio 6    Lactate Dehydrogenase     Collection Time: 03/13/25  4:39 PM   Result Value Ref Range    Lactate Dehydrogenase 267 (H) 125 - 220 U/L   Uric Acid    Collection Time: 03/13/25  4:39 PM   Result Value Ref Range    Uric Acid 8.1 (H) 3.5 - 7.2 mg/dL   CBC with Differential    Collection Time: 03/13/25  4:39 PM   Result Value Ref Range    WBC 8.56 4.50 - 11.50 x10(3)/mcL    RBC 3.61 (L) 4.70 - 6.10 x10(6)/mcL    Hgb 7.4 (L) 14.0 - 18.0 g/dL    Hct 26.0 (L) 42.0 - 52.0 %    MCV 72.0 (L) 80.0 - 94.0 fL    MCH 20.5 (L) 27.0 - 31.0 pg    MCHC 28.5 (L) 33.0 - 36.0 g/dL    RDW 18.5 (H) 11.5 - 17.0 %    Platelet 301 130 - 400 x10(3)/mcL    MPV 10.1 7.4 - 10.4 fL    Neut % 71.7 %    Lymph % 19.2 %    Mono % 5.4 %    Eos % 1.6 %    Basophil % 0.9 %    Imm Grans % 1.2 %    Neut # 6.14 2.1 - 9.2 x10(3)/mcL    Lymph # 1.64 0.6 - 4.6 x10(3)/mcL    Mono # 0.46 0.1 - 1.3 x10(3)/mcL    Eos # 0.14 0 - 0.9 x10(3)/mcL    Baso # 0.08 <=0.2 x10(3)/mcL    Imm Gran # 0.10 (H) 0.00 - 0.04 x10(3)/mcL    NRBC% 2.3 %   SYPHILIS ANTIBODY (WITH REFLEX RPR)    Collection Time: 03/13/25  4:39 PM   Result Value Ref Range    Syphilis Antibody Nonreactive Nonreactive, Equivocal   Type & Screen    Collection Time: 03/13/25  4:39 PM   Result Value Ref Range    Group & Rh B POS     Indirect Eve GEL NEG     Specimen Outdate 03/16/2025 23:59    Prepare RBC 2 Units; high pph risk    Collection Time: 03/13/25  4:39 PM   Result Value Ref Range    UNIT NUMBER [id number]     UNIT ABO/RH B POS     DISPENSE STATUS Selected     Unit Expiration [id number]     Product Code T4592I64     Unit Blood Type Code 7300     CROSSMATCH INTERPRETATION Compatible     UNIT NUMBER [id number]     UNIT ABO/RH B POS     DISPENSE STATUS Selected     Unit Expiration [id number]     Product Code J8008A00     Unit Blood Type Code 7300     CROSSMATCH INTERPRETATION Compatible    POCT glucose    Collection Time: 03/13/25  7:41 PM   Result Value Ref Range    POCT Glucose 138 (H) 70 - 110 mg/dL          Assessment/Plan:     Active Problem List with Overview Notes    Diagnosis Date Noted    Elevated blood pressure affecting pregnancy, antepartum 2025    Elevated blood pressure reading without diagnosis of hypertension 03/10/2025    Excessive weight gain during pregnancy in third trimester 2025    Noncompliance 10/30/2024    Short interval between pregnancies affecting pregnancy in third trimester, antepartum 2024    Previous  delivery, antepartum 2024    Poor historian 2024    Maternal care for trend for excessive fetal growth in third trimester 2024    Functional neurological symptom disorder with attacks or seizures 2023    Localization-related (focal) (partial) idiopathic epilepsy and epileptic syndromes with seizures of localized onset, not intractable, without status epilepticus 2023    Hx of preeclampsia, prior pregnancy, currently pregnant, second trimester 2023    At high risk for complications of intrauterine pregnancy (IUP) 2023    BMI>30 affecting pregnancy in third trimester 2023    Seizure disorder during pregnancy in third trimester 10/19/2023    Pre-existing type 2 diabetes mellitus during pregnancy in third trimester 2023    Bipolar disease during pregnancy in third trimester 2021      The  has been fully reviewed with the patient and written informed consent has been obtained.  Risks including, but not limited to, hemorrhage, need for blood transfusion (and its inherent risks of acquiring HIV, Hep B),  infection (>5% risk of skin infection with lower risk of deeper infection requiring further surgery), injury to surrounding structures (bowel/bladder/ureters/baby), risk of injury to pt have been explained. The patient wishes to proceed.  All questions were answered.    To OR 3/14 for CS    T2DM - MFM recs appreciated      Isma Payne MD                       [1]   Current Facility-Administered  Medications   Medication Dose Route Frequency Provider Last Rate Last Admin    carboprost injection 250 mcg  250 mcg Intramuscular On Call Procedure Isma Payne MD        famotidine (PF) injection 20 mg  20 mg Intravenous On Call Procedure Isma Payne MD        lactated ringers bolus 1,000 mL  1,000 mL Intravenous PRN Isma Payne MD        lactated ringers infusion   Intravenous Continuous Isma Payne MD        levetiracetam 500 mg/5 mL (5 mL) liquid Soln 750 mg  750 mg Oral BID Isma Payne MD   750 mg at 03/13/25 2121    metFORMIN tablet 500 mg  500 mg Oral BID WM Isma Payne MD   500 mg at 03/13/25 1926    methylergonovine injection 200 mcg  200 mcg Intramuscular On Call Procedure Isma Payne MD        miSOPROStoL tablet 800 mcg  800 mcg Rectal On Call Procedure Isma Payne MD        sodium citrate-citric acid 500-334 mg/5 ml solution 30 mL  30 mL Oral On Call Procedure Isma Payne MD

## 2025-03-14 NOTE — PLAN OF CARE
Problem: Adult Inpatient Plan of Care  Goal: Plan of Care Review  3/13/2025 1949 by Keisha Kendall RN  Outcome: Progressing  3/13/2025 1948 by Keisha Kendall RN  Outcome: Progressing  Goal: Patient-Specific Goal (Individualized)  3/13/2025 1949 by Keisha Kendall RN  Outcome: Progressing  3/13/2025 1948 by Keisha Kendall RN  Outcome: Progressing  Goal: Absence of Hospital-Acquired Illness or Injury  3/13/2025 1949 by Keisha Kendall RN  Outcome: Progressing  3/13/2025 1948 by Keisha Kendall RN  Outcome: Progressing  Goal: Optimal Comfort and Wellbeing  3/13/2025 1949 by Keisha Kendall RN  Outcome: Progressing  3/13/2025 1948 by Keisha Kendall RN  Outcome: Progressing  Goal: Readiness for Transition of Care  3/13/2025 1949 by Keisha Kendall RN  Outcome: Progressing  3/13/2025 1948 by Keisha Kendall RN  Outcome: Progressing     Problem: Diabetes Comorbidity  Goal: Blood Glucose Level Within Targeted Range  3/13/2025 1949 by Keisha Kendall RN  Outcome: Progressing  3/13/2025 1948 by Keisha Kendall RN  Outcome: Progressing     Problem:  Fall Injury Risk  Goal: Absence of Fall, Infant Drop and Related Injury  3/13/2025 1949 by Keisha Kendall RN  Outcome: Progressing  3/13/2025 1948 by Keisha Kendall RN  Outcome: Progressing     Problem: Infection  Goal: Absence of Infection Signs and Symptoms  3/13/2025 1949 by Keisha Kendall RN  Outcome: Progressing  3/13/2025 1948 by Keisha Kendall RN  Outcome: Progressing

## 2025-03-14 NOTE — ANESTHESIA PROCEDURE NOTES
Spinal    Diagnosis: IUP  Patient location during procedure: OR  Start time: 3/14/2025 12:14 PM  Timeout: 3/14/2025 12:13 PM  End time: 3/14/2025 12:17 PM    Staffing  Authorizing Provider: Warren Martinez CRNA  Performing Provider: Warren Martinez CRNA    Staffing  Performed by: Warren Martinez CRNA  Authorized by: Warren Martinez CRNA    Preanesthetic Checklist  Completed: patient identified, IV checked, site marked, risks and benefits discussed, surgical consent, monitors and equipment checked, pre-op evaluation and timeout performed  Spinal Block  Patient position: sitting  Prep: ChloraPrep  Patient monitoring: heart rate  Approach: midline  Location: L3-4  Injection technique: single shot  CSF Fluid: clear free-flowing CSF  Needle  Needle type: Kt   Needle gauge: 25 G  Needle length: 3.5 in  Needle localization: anatomical landmarks  Assessment   Dermatomal levels determined by alcohol wipe  Ease of block: easy  Patient's tolerance of the procedure: comfortable throughout block  Additional Notes  Straightforward, one pass, + csf pre and post aspiration, no paresthesias  Pt did cry near the end of procedure, but stated it was BP cuff and IV pain    Bailey SHARPE   Medications:    Medications: bupivacaine (pf) (MARCAINE) injection 0.75% - Intraspinal   1.7 mL - 3/14/2025 12:14:00 PM

## 2025-03-14 NOTE — ANESTHESIA PREPROCEDURE EVALUATION
03/14/2025  Norma Denise is a 25 y.o., adult.    Patient had a difficult neuraxial experience during labor with an anesthesiologist and CRNA both attempting epidural placement with inability to identify epidural space.  Hx of scoliosis.  Eventually, patient's labor led to CS, which required GETA.  After discussing options today, patient would like to try a spinal, but with the idea that we would abort if there were any significant difficulty in placing the SAB.  GETA backup.    7.4 / 26 / 301k  Na 138, K 3.6, Cr 0.61  Type and crossed, will start transfusing    Pre-op Assessment    I have reviewed the Patient Summary Reports.     I have reviewed the Nursing Notes. I have reviewed the NPO Status.   I have reviewed the Medications.     Review of Systems  Anesthesia Hx:  No problems with previous Anesthesia               Denies Personal Hx of Anesthesia complications.                    Social:  Non-Smoker       Hematology/Oncology:       -- Anemia:                                  Cardiovascular:     Hypertension   Denies MI.        Denies Angina.  Denies CHF.                             Hypertension         Pulmonary:    Denies COPD. Asthma mild      Asthma:               Hepatic/GI:      Denies GERD.                Neurological:       Seizures           Seizure Disorder                          Endocrine:  Diabetes, well controlled, type 2 Denies Hypothyroidism.  Denies Hyperthyroidism.  Diabetes                    Obesity / BMI > 30  Psych:  Psychiatric History (bipolar) anxiety                 Physical Exam  General: Well nourished, Cooperative, Alert and Oriented    Airway:  Mallampati: I   Mouth Opening: Normal  TM Distance: Normal  Tongue: Normal  Neck ROM: Normal ROM    Dental:  Intact        Anesthesia Plan  Type of Anesthesia, risks & benefits discussed:    Anesthesia Type: Spinal  Intra-op  Monitoring Plan: Standard ASA Monitors  Post Op Pain Control Plan: intrathecal opioid  Induction:  IV  Informed Consent: Informed consent signed with the Patient and all parties understand the risks and agree with anesthesia plan.  All questions answered.   ASA Score: 3  Day of Surgery Review of History & Physical: H&P Update referred to the surgeon/provider.    Ready For Surgery From Anesthesia Perspective.     .

## 2025-03-14 NOTE — TRANSFER OF CARE
Anesthesia Transfer of Care Note    Patient: Norma Denise    Procedure(s) Performed: Procedure(s) (LRB):   SECTION (N/A)    Patient location: Other: OB    Anesthesia Type: spinal    Transport from OR: Transported from OR on room air with adequate spontaneous ventilation    Post pain: adequate analgesia    Post assessment: no apparent anesthetic complications    Post vital signs: stable    Level of consciousness: awake, oriented and alert    Nausea/Vomiting: no nausea/vomiting    Complications: none    Transfer of care protocol was followed      Last vitals: Visit Vitals  /76   Pulse 70   Temp 36 °C (96.8 °F)   Resp (!) 23   LMP 2024 (Approximate)   SpO2 99%   Breastfeeding No

## 2025-03-14 NOTE — PLAN OF CARE
Problem: Adult Inpatient Plan of Care  Goal: Plan of Care Review  3/14/2025 0734 by Vivian Haq RN  Outcome: Progressing  3/14/2025 0733 by Vivian Haq RN  Outcome: Progressing  Goal: Patient-Specific Goal (Individualized)  3/14/2025 0734 by Vivian Haq RN  Outcome: Progressing  3/14/2025 0733 by Vivian Haq RN  Outcome: Progressing  Goal: Absence of Hospital-Acquired Illness or Injury  3/14/2025 0734 by Vivian Haq RN  Outcome: Progressing  3/14/2025 0733 by Vivian Haq RN  Outcome: Progressing  Goal: Optimal Comfort and Wellbeing  3/14/2025 0734 by Vivian Haq RN  Outcome: Progressing  3/14/2025 0733 by Vivian Haq RN  Outcome: Progressing  Goal: Readiness for Transition of Care  3/14/2025 0734 by Vivian Haq RN  Outcome: Progressing  3/14/2025 0733 by Vivian Haq RN  Outcome: Progressing     Problem: Diabetes Comorbidity  Goal: Blood Glucose Level Within Targeted Range  3/14/2025 0734 by Vivian Haq RN  Outcome: Progressing  3/14/2025 0733 by Vivian Haq RN  Outcome: Progressing     Problem:  Fall Injury Risk  Goal: Absence of Fall, Infant Drop and Related Injury  3/14/2025 0734 by Vivian Haq RN  Outcome: Progressing  3/14/2025 0733 by Vivian Haq RN  Outcome: Progressing     Problem: Infection  Goal: Absence of Infection Signs and Symptoms  3/14/2025 0734 by Vivian Haq RN  Outcome: Progressing  3/14/2025 0733 by Vivian Haq RN  Outcome: Progressing     Problem:  Delivery  Goal: Bleeding is Controlled  3/14/2025 0734 by Vivian Haq RN  Outcome: Progressing  3/14/2025 0733 by Vivian Haq RN  Outcome: Progressing  Goal: Stable Fetal Wellbeing  3/14/2025 0734 by Vivian Haq RN  Outcome: Progressing  3/14/2025 0733 by Vivian Haq RN  Outcome: Progressing  Goal: Absence of Infection Signs and Symptoms  3/14/2025 0734 by Vivian Haq RN  Outcome: Progressing  3/14/2025 0733 by Vivian Haq RN  Outcome: Progressing  Goal: Effective Oxygenation and  Ventilation  3/14/2025 0734 by Vivian Haq, RN  Outcome: Progressing  3/14/2025 0733 by Vivian Haq, RN  Outcome: Progressing

## 2025-03-14 NOTE — OP NOTE
2025  1:16 PM     Section Operative Report    Indications: desired rCS    Pre-operative Diagnosis: 25 y.o. year old female at 37w0d IUP, indications as above,   Problem List[1]      Post-operative Diagnosis: same    Procedure: Low transverse  section    Surgeon: Isma Payne MD    Assistants:  Koffi Bowers MD, whose assistance was appreciated throughout the case    Anesthesia: spinal    Antibiotics: Ancef 2 gm IV    Complications: none    Findings:  Normal appearing gravid uterus, tubes and ovaries.     Measured Blood Loss: 685 mL           Drains: Howard catheter    Procedure:  The patient was taken to the operating room after informed consent was given. After induction of anesthesia, patient was placed in dorsal supine position with left lateral tilt. The patient was prepped and draped in the usual sterile manner. A time out was held and the patient's name and the procedure was confirmed. Adequacy of anesthesia was confirmed.     A Kris-Amaya incision was made and carried down through the subcutaneous tissue to the fascia. Small bilateral incisions on the fascia were made using the scalpel and the fascial incision was extended laterally with manual traction cephalad and caudad. Midline of the rectus muscles were identified and the peritoneum entered bluntly and the muscles  laterally manually. Peritoneal incision was extended longitudinally with manual traction. Alex was inserted. The lower uterine segment was identified.    A low transverse uterine incision was made. The infant delivered from a vertex presentation. After the umbilical cord was doubly clamped and cut, cord blood was obtained for evaluation. The placenta was removed intact and appeared normal.  The uterus was exteriorized and cleaned of all clots and debris.  The uterine outline, tubes and ovaries appeared normal. The uterine incision was closed with running unlocked sutures of 0-vicryl. An imbricating layer  was not required. The uterus was internalized. Hemostasis was observed. The fascia was then reapproximated with running sutures of 0-looped PDS. Subcutaneous adipose tissue was reapproximated and closed with running sutures of 2-0 vicryl. The skin was reapproximated with insorb.    Instrument, sponge, and needle counts were correct prior the abdominal closure and at the conclusion of the case.  Patient was taken to the recovery room in a stable condition.     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

## 2025-03-15 LAB
BASOPHILS # BLD AUTO: 0.03 X10(3)/MCL
BASOPHILS NFR BLD AUTO: 0.2 %
EOSINOPHIL # BLD AUTO: 0.08 X10(3)/MCL (ref 0–0.9)
EOSINOPHIL NFR BLD AUTO: 0.6 %
ERYTHROCYTE [DISTWIDTH] IN BLOOD BY AUTOMATED COUNT: 18.8 % (ref 11.5–17)
GLUCOSE SERPL-MCNC: 79 MG/DL (ref 70–110)
GLUCOSE SERPL-MCNC: 89 MG/DL (ref 70–110)
HCT VFR BLD AUTO: 24.1 % (ref 42–52)
HGB BLD-MCNC: 7.4 G/DL (ref 14–18)
IMM GRANULOCYTES # BLD AUTO: 0.15 X10(3)/MCL (ref 0–0.04)
IMM GRANULOCYTES NFR BLD AUTO: 1.2 %
LYMPHOCYTES # BLD AUTO: 2.37 X10(3)/MCL (ref 0.6–4.6)
LYMPHOCYTES NFR BLD AUTO: 18.2 %
MCH RBC QN AUTO: 22 PG (ref 27–31)
MCHC RBC AUTO-ENTMCNC: 30.7 G/DL (ref 33–36)
MCV RBC AUTO: 71.7 FL (ref 80–94)
MONOCYTES # BLD AUTO: 0.89 X10(3)/MCL (ref 0.1–1.3)
MONOCYTES NFR BLD AUTO: 6.8 %
NEUTROPHILS # BLD AUTO: 9.5 X10(3)/MCL (ref 2.1–9.2)
NEUTROPHILS NFR BLD AUTO: 73 %
NRBC BLD AUTO-RTO: 1.5 %
PLATELET # BLD AUTO: 269 X10(3)/MCL (ref 130–400)
PMV BLD AUTO: 9.8 FL (ref 7.4–10.4)
POCT GLUCOSE: 74 MG/DL (ref 70–110)
POCT GLUCOSE: 89 MG/DL (ref 70–110)
RBC # BLD AUTO: 3.36 X10(6)/MCL (ref 4.7–6.1)
WBC # BLD AUTO: 13.02 X10(3)/MCL (ref 4.5–11.5)

## 2025-03-15 PROCEDURE — 25000003 PHARM REV CODE 250: Performed by: STUDENT IN AN ORGANIZED HEALTH CARE EDUCATION/TRAINING PROGRAM

## 2025-03-15 PROCEDURE — 25000003 PHARM REV CODE 250: Performed by: OBSTETRICS & GYNECOLOGY

## 2025-03-15 PROCEDURE — 63600175 PHARM REV CODE 636 W HCPCS: Mod: JZ,TB | Performed by: STUDENT IN AN ORGANIZED HEALTH CARE EDUCATION/TRAINING PROGRAM

## 2025-03-15 PROCEDURE — 36415 COLL VENOUS BLD VENIPUNCTURE: CPT | Performed by: STUDENT IN AN ORGANIZED HEALTH CARE EDUCATION/TRAINING PROGRAM

## 2025-03-15 PROCEDURE — 11000001 HC ACUTE MED/SURG PRIVATE ROOM

## 2025-03-15 PROCEDURE — 85025 COMPLETE CBC W/AUTO DIFF WBC: CPT | Performed by: STUDENT IN AN ORGANIZED HEALTH CARE EDUCATION/TRAINING PROGRAM

## 2025-03-15 RX ADMIN — LEVETIRACETAM 750 MG: 100 SOLUTION ORAL at 08:03

## 2025-03-15 RX ADMIN — DOCUSATE SODIUM 200 MG: 100 CAPSULE, LIQUID FILLED ORAL at 07:03

## 2025-03-15 RX ADMIN — ENOXAPARIN SODIUM 50 MG: 60 INJECTION SUBCUTANEOUS at 07:03

## 2025-03-15 RX ADMIN — METFORMIN HYDROCHLORIDE 500 MG: 500 TABLET, FILM COATED ORAL at 08:03

## 2025-03-15 RX ADMIN — KETOROLAC TROMETHAMINE 30 MG: 30 INJECTION, SOLUTION INTRAMUSCULAR at 02:03

## 2025-03-15 RX ADMIN — NIFEDIPINE 60 MG: 60 TABLET, FILM COATED, EXTENDED RELEASE ORAL at 08:03

## 2025-03-15 RX ADMIN — METFORMIN HYDROCHLORIDE 500 MG: 500 TABLET, FILM COATED ORAL at 06:03

## 2025-03-15 RX ADMIN — DOCUSATE SODIUM 200 MG: 100 CAPSULE, LIQUID FILLED ORAL at 08:03

## 2025-03-15 RX ADMIN — KETOROLAC TROMETHAMINE 30 MG: 30 INJECTION, SOLUTION INTRAMUSCULAR at 11:03

## 2025-03-15 RX ADMIN — LEVETIRACETAM 750 MG: 100 SOLUTION ORAL at 07:03

## 2025-03-15 RX ADMIN — IBUPROFEN 800 MG: 800 TABLET, FILM COATED ORAL at 06:03

## 2025-03-15 NOTE — NURSING
Nurse was not told that patient ate breakfast therefore was not able to get a fasting blood sugar.

## 2025-03-15 NOTE — ANESTHESIA POSTPROCEDURE EVALUATION
Anesthesia Post Evaluation    Patient: Norma Denise    Procedure(s) Performed: Procedure(s) (LRB):   SECTION (N/A)    Final Anesthesia Type: spinal      Patient location during evaluation: floor  Patient participation: Yes- Able to Participate  Level of consciousness: awake and alert and oriented  Post-procedure vital signs: reviewed and stable  Pain management: adequate  Airway patency: patent    PONV status at discharge: No PONV  Anesthetic complications: no      Cardiovascular status: blood pressure returned to baseline  Respiratory status: unassisted, spontaneous ventilation and room air  Hydration status: euvolemic  Follow-up not needed.  Comments: Denies headache, mod-severe backpain, or lower extremity motor weekness              Vitals Value Taken Time   /76 03/15/25 13:50   Temp 36.7 °C (98.1 °F) 03/15/25 13:50   Pulse 90 03/15/25 13:50   Resp 18 25 13:58   SpO2 99 % 03/15/25 13:50         Event Time   Out of Recovery 2025 14:00:00         Pain/Calixto Score: Pain Rating Prior to Med Admin: 4 (3/15/2025 11:44 AM)  Calixto Score: 9 (3/14/2025  1:58 PM)

## 2025-03-15 NOTE — PROGRESS NOTES
[facility]                       [address] [id number]    PATIENT NAME:       SUSU SEVERINO  YOB: 2000  CSN:                [id number]   MRN:                [id number]  ADMIT DATE:         2025 16:15:00  PHYSICIAN:          Avila Plaza Jr, MD                            PROGRESS NOTE    DATE:      SUBJECTIVE:  A 25-year-old  4, para 2-1-1-2, status post a repeat    section on  by Dr. Isma Payne.  She is without complaints this   a.m.  Reports pain is controlled with p.o. pain medicine.  She is ambulating.    Tolerating a clear liquid diet.  No other complaints are voiced.    OBJECTIVE:  VITAL SIGNS:  Her vitals; blood pressure 137/82, respirations 18,   temperature 98.5.  CARDIOVASCULAR:  S1 and S2.  No murmurs.  LUNGS:  Clear.  ABDOMEN:  Soft.  Appropriately tender.  EXTREMITIES:  Had trace lower extremity edema.  Incision was covered.    LABORATORY DATA:  The patient had a postoperative H and H that was 7 and 24.    She is without complaints there.    ASSESSMENT:    1. Postoperative day #1, repeat  section.  2. Postpartum atony, resolved.  3. H and H stable.  The patient is asymptomatic for this low H and H.    PLAN:  Continue management.        ______________________________  Avila Plaza Jr, MD    DJE/AQS  DD:  03/15/2025  Time:  10:55AM  DT:  03/15/2025  Time:  11:03AM  Job #:  [id number]      PROGRESS NOTE

## 2025-03-15 NOTE — PLAN OF CARE
Problem: Adult Inpatient Plan of Care  Goal: Patient-Specific Goal (Individualized)  Outcome: Progressing  Flowsheets (Taken 3/14/2025 2143)  Patient/Family-Specific Goals (Include Timeframe): pain control     Problem: Adult Inpatient Plan of Care  Goal: Absence of Hospital-Acquired Illness or Injury  Outcome: Progressing     Problem: Adult Inpatient Plan of Care  Goal: Optimal Comfort and Wellbeing  Outcome: Progressing     Problem:  Delivery  Goal: Absence of Infection Signs and Symptoms  Outcome: Progressing

## 2025-03-15 NOTE — PLAN OF CARE
Problem: Adult Inpatient Plan of Care  Goal: Plan of Care Review  Outcome: Progressing  Goal: Patient-Specific Goal (Individualized)  Outcome: Progressing  Goal: Absence of Hospital-Acquired Illness or Injury  Outcome: Progressing  Goal: Optimal Comfort and Wellbeing  Outcome: Progressing  Goal: Readiness for Transition of Care  Outcome: Progressing     Problem: Diabetes Comorbidity  Goal: Blood Glucose Level Within Targeted Range  Outcome: Progressing     Problem:  Fall Injury Risk  Goal: Absence of Fall, Infant Drop and Related Injury  Outcome: Progressing     Problem: Infection  Goal: Absence of Infection Signs and Symptoms  Outcome: Progressing     Problem:  Delivery  Goal: Bleeding is Controlled  Outcome: Progressing  Goal: Stable Fetal Wellbeing  Outcome: Progressing  Goal: Absence of Infection Signs and Symptoms  Outcome: Progressing  Goal: Effective Oxygenation and Ventilation  Outcome: Progressing     Problem: Wound  Goal: Optimal Coping  Outcome: Progressing  Goal: Optimal Functional Ability  Outcome: Progressing  Goal: Absence of Infection Signs and Symptoms  Outcome: Progressing  Goal: Improved Oral Intake  Outcome: Progressing  Goal: Optimal Pain Control and Function  Outcome: Progressing  Goal: Skin Health and Integrity  Outcome: Progressing  Goal: Optimal Wound Healing  Outcome: Progressing

## 2025-03-16 PROCEDURE — 25000003 PHARM REV CODE 250: Performed by: STUDENT IN AN ORGANIZED HEALTH CARE EDUCATION/TRAINING PROGRAM

## 2025-03-16 PROCEDURE — 63600175 PHARM REV CODE 636 W HCPCS: Performed by: STUDENT IN AN ORGANIZED HEALTH CARE EDUCATION/TRAINING PROGRAM

## 2025-03-16 PROCEDURE — 11000001 HC ACUTE MED/SURG PRIVATE ROOM

## 2025-03-16 PROCEDURE — 25000003 PHARM REV CODE 250: Performed by: OBSTETRICS & GYNECOLOGY

## 2025-03-16 RX ADMIN — LEVETIRACETAM 750 MG: 100 SOLUTION ORAL at 08:03

## 2025-03-16 RX ADMIN — ENOXAPARIN SODIUM 50 MG: 60 INJECTION SUBCUTANEOUS at 08:03

## 2025-03-16 RX ADMIN — PRENATAL VITAMINS-IRON FUMARATE 27 MG IRON-FOLIC ACID 0.8 MG TABLET 1 TABLET: at 08:03

## 2025-03-16 RX ADMIN — NIFEDIPINE 60 MG: 60 TABLET, FILM COATED, EXTENDED RELEASE ORAL at 08:03

## 2025-03-16 RX ADMIN — IBUPROFEN 800 MG: 800 TABLET, FILM COATED ORAL at 04:03

## 2025-03-16 RX ADMIN — DOCUSATE SODIUM 200 MG: 100 CAPSULE, LIQUID FILLED ORAL at 08:03

## 2025-03-16 RX ADMIN — METFORMIN HYDROCHLORIDE 500 MG: 500 TABLET, FILM COATED ORAL at 05:03

## 2025-03-16 RX ADMIN — IBUPROFEN 800 MG: 800 TABLET, FILM COATED ORAL at 05:03

## 2025-03-16 RX ADMIN — METFORMIN HYDROCHLORIDE 500 MG: 500 TABLET, FILM COATED ORAL at 08:03

## 2025-03-16 NOTE — PROGRESS NOTES
[facility]                       [address] [id number]    PATIENT NAME:       SUSU SEVERINO  YOB: 2000  CSN:                [id number]   MRN:                [id number]  ADMIT DATE:         2025 16:15:00  PHYSICIAN:          Avila Plaza Jr, MD                            PROGRESS NOTE    DATE:      SUBJECTIVE:  The patient is postoperative day #2 for a repeat  section.    The patient is without complaints.  She reports pain is well controlled,   ambulating, tolerating diet without difficulty.  Her vitals are stable.  She was   afebrile.  Physical exam is unchanged.    ASSESSMENT:  Postoperative day #2 for a  section.  Doing well.    PLAN:  Continue management.        ______________________________  Avlia Plaza Jr, MD    DJE/AQS  DD:  2025  Time:  06:58AM  DT:  2025  Time:  07:33AM  Job #:  [id number]      PROGRESS NOTE

## 2025-03-16 NOTE — PLAN OF CARE
Problem: Adult Inpatient Plan of Care  Goal: Patient-Specific Goal (Individualized)  Outcome: Progressing     Problem: Adult Inpatient Plan of Care  Goal: Optimal Comfort and Wellbeing  Outcome: Progressing     Problem: Infection  Goal: Absence of Infection Signs and Symptoms  Outcome: Progressing     Problem: Wound  Goal: Optimal Pain Control and Function  Outcome: Progressing

## 2025-03-17 LAB
ABO + RH BLD: NORMAL
ABO + RH BLD: NORMAL
BLD PROD TYP BPU: NORMAL
BLD PROD TYP BPU: NORMAL
BLOOD UNIT EXPIRATION DATE: NORMAL
BLOOD UNIT EXPIRATION DATE: NORMAL
BLOOD UNIT TYPE CODE: 7300
BLOOD UNIT TYPE CODE: 7300
CROSSMATCH INTERPRETATION: NORMAL
CROSSMATCH INTERPRETATION: NORMAL
DISPENSE STATUS: NORMAL
DISPENSE STATUS: NORMAL
GLUCOSE SERPL-MCNC: 106 MG/DL (ref 70–110)
GLUCOSE SERPL-MCNC: 96 MG/DL (ref 70–110)
GLUCOSE SERPL-MCNC: NORMAL MG/DL (ref 70–110)
POCT GLUCOSE: 105 MG/DL (ref 70–110)
POCT GLUCOSE: 106 MG/DL (ref 70–110)
POCT GLUCOSE: 166 MG/DL (ref 70–110)
POCT GLUCOSE: 69 MG/DL (ref 70–110)
POCT GLUCOSE: 82 MG/DL (ref 70–110)
POCT GLUCOSE: 88 MG/DL (ref 70–110)
POCT GLUCOSE: 99 MG/DL (ref 70–110)
RBCS: NORMAL
UNIT NUMBER: NORMAL
UNIT NUMBER: NORMAL

## 2025-03-17 PROCEDURE — 11000001 HC ACUTE MED/SURG PRIVATE ROOM

## 2025-03-17 PROCEDURE — 63600175 PHARM REV CODE 636 W HCPCS: Performed by: STUDENT IN AN ORGANIZED HEALTH CARE EDUCATION/TRAINING PROGRAM

## 2025-03-17 PROCEDURE — 25000003 PHARM REV CODE 250: Performed by: STUDENT IN AN ORGANIZED HEALTH CARE EDUCATION/TRAINING PROGRAM

## 2025-03-17 PROCEDURE — 25000003 PHARM REV CODE 250: Performed by: OBSTETRICS & GYNECOLOGY

## 2025-03-17 RX ORDER — LANOLIN ALCOHOL/MO/W.PET/CERES
1 CREAM (GRAM) TOPICAL DAILY
Status: DISCONTINUED | OUTPATIENT
Start: 2025-03-17 | End: 2025-03-18 | Stop reason: HOSPADM

## 2025-03-17 RX ADMIN — ENOXAPARIN SODIUM 50 MG: 60 INJECTION SUBCUTANEOUS at 09:03

## 2025-03-17 RX ADMIN — NIFEDIPINE 60 MG: 60 TABLET, FILM COATED, EXTENDED RELEASE ORAL at 07:03

## 2025-03-17 RX ADMIN — LEVETIRACETAM 750 MG: 100 SOLUTION ORAL at 09:03

## 2025-03-17 RX ADMIN — LEVETIRACETAM 750 MG: 100 SOLUTION ORAL at 07:03

## 2025-03-17 RX ADMIN — IBUPROFEN 800 MG: 800 TABLET, FILM COATED ORAL at 02:03

## 2025-03-17 RX ADMIN — SIMETHICONE 80 MG: 80 TABLET, CHEWABLE ORAL at 07:03

## 2025-03-17 RX ADMIN — FERROUS SULFATE TAB 325 MG (65 MG ELEMENTAL FE) 1 EACH: 325 (65 FE) TAB at 05:03

## 2025-03-17 RX ADMIN — DOCUSATE SODIUM 200 MG: 100 CAPSULE, LIQUID FILLED ORAL at 07:03

## 2025-03-17 RX ADMIN — ACETAMINOPHEN 1000 MG: 500 TABLET ORAL at 05:03

## 2025-03-17 RX ADMIN — METFORMIN HYDROCHLORIDE 500 MG: 500 TABLET, FILM COATED ORAL at 07:03

## 2025-03-17 RX ADMIN — PRENATAL VITAMINS-IRON FUMARATE 27 MG IRON-FOLIC ACID 0.8 MG TABLET 1 TABLET: at 07:03

## 2025-03-17 RX ADMIN — METFORMIN HYDROCHLORIDE 500 MG: 500 TABLET, FILM COATED ORAL at 05:03

## 2025-03-17 RX ADMIN — IBUPROFEN 800 MG: 800 TABLET, FILM COATED ORAL at 09:03

## 2025-03-17 RX ADMIN — ENOXAPARIN SODIUM 50 MG: 60 INJECTION SUBCUTANEOUS at 07:03

## 2025-03-17 NOTE — PROGRESS NOTES
PostPartum Progress Note        Subjective:      Postpartum Day #3 after LTCS  .  Patient is without complaints. Lochia decreasing, less than menses.  Pain is well controlled. Patient is ambulating without lightheadedness. Passing flatus. Tolerating regular diet.    Objective:      Temp:  [97.7 °F (36.5 °C)-98.2 °F (36.8 °C)] 98.1 °F (36.7 °C)  Pulse:  [70-94] 73  Resp:  [17-18] 18  SpO2:  [97 %-100 %] 98 %  BP: (117-131)/(74-82) 128/81  No intake or output data in the 24 hours ending 25 1215  Body mass index is 35.94 kg/m².    General: no acute distress  Abdomen: soft, appropriately tender,LTCS incision healing well   Extremities: non-tender, symmetric    Group & Rh   Date Value Ref Range Status   2025 B POS  Final     Recent Results (from the past 2 weeks)   CBC with Differential    Collection Time: 03/15/25  8:13 AM   Result Value Ref Range    WBC 13.02 (H) 4.50 - 11.50 x10(3)/mcL    Hgb 7.4 (L) 14.0 - 18.0 g/dL    Hct 24.1 (L) 42.0 - 52.0 %    Platelet 269 130 - 400 x10(3)/mcL   CBC with Differential    Collection Time: 25  4:39 PM   Result Value Ref Range    WBC 8.56 4.50 - 11.50 x10(3)/mcL    Hgb 7.4 (L) 14.0 - 18.0 g/dL    Hct 26.0 (L) 42.0 - 52.0 %    Platelet 301 130 - 400 x10(3)/mcL          Assessment/Plan     25 y.o.  S/P , PPD # 3 - Doing appropriately   -Continue routine postpartum care  -Anticipated d/c POD#4    Active Problem List with Overview Notes    Diagnosis Date Noted    Elevated blood pressure affecting pregnancy, antepartum 2025    Elevated blood pressure reading without diagnosis of hypertension 03/10/2025    Excessive weight gain during pregnancy in third trimester 2025    Noncompliance 10/30/2024    Short interval between pregnancies affecting pregnancy in third trimester, antepartum 2024    Previous  delivery, antepartum 2024    Poor historian 2024    Maternal care for trend for excessive fetal growth in third  trimester 02/22/2024    Functional neurological symptom disorder with attacks or seizures 12/27/2023    Localization-related (focal) (partial) idiopathic epilepsy and epileptic syndromes with seizures of localized onset, not intractable, without status epilepticus 12/27/2023    Hx of preeclampsia, prior pregnancy, currently pregnant, second trimester 11/01/2023    At high risk for complications of intrauterine pregnancy (IUP) 11/01/2023    BMI>30 affecting pregnancy in third trimester 11/01/2023    Seizure disorder during pregnancy in third trimester 10/19/2023    Pre-existing type 2 diabetes mellitus during pregnancy in third trimester 07/13/2023    Bipolar disease during pregnancy in third trimester 08/25/2021           Isma Payne MD  03/17/2025 12:15 PM

## 2025-03-17 NOTE — PLAN OF CARE
Problem: Adult Inpatient Plan of Care  Goal: Patient-Specific Goal (Individualized)  Outcome: Progressing     Problem: Adult Inpatient Plan of Care  Goal: Optimal Comfort and Wellbeing  Outcome: Progressing     Problem:  Delivery  Goal: Absence of Infection Signs and Symptoms  Outcome: Progressing     Problem: Wound  Goal: Skin Health and Integrity  Outcome: Progressing

## 2025-03-18 VITALS
RESPIRATION RATE: 18 BRPM | OXYGEN SATURATION: 100 % | SYSTOLIC BLOOD PRESSURE: 142 MMHG | WEIGHT: 216 LBS | DIASTOLIC BLOOD PRESSURE: 84 MMHG | BODY MASS INDEX: 35.94 KG/M2 | HEART RATE: 82 BPM | TEMPERATURE: 98 F

## 2025-03-18 LAB
GLUCOSE SERPL-MCNC: 96 MG/DL (ref 70–110)
POCT GLUCOSE: 91 MG/DL (ref 70–110)
POCT GLUCOSE: 95 MG/DL (ref 70–110)
POCT GLUCOSE: 96 MG/DL (ref 70–110)

## 2025-03-18 PROCEDURE — 25000003 PHARM REV CODE 250: Performed by: STUDENT IN AN ORGANIZED HEALTH CARE EDUCATION/TRAINING PROGRAM

## 2025-03-18 PROCEDURE — 25000003 PHARM REV CODE 250: Performed by: OBSTETRICS & GYNECOLOGY

## 2025-03-18 RX ADMIN — NIFEDIPINE 60 MG: 60 TABLET, FILM COATED, EXTENDED RELEASE ORAL at 07:03

## 2025-03-18 RX ADMIN — PRENATAL VITAMINS-IRON FUMARATE 27 MG IRON-FOLIC ACID 0.8 MG TABLET 1 TABLET: at 07:03

## 2025-03-18 RX ADMIN — METFORMIN HYDROCHLORIDE 500 MG: 500 TABLET, FILM COATED ORAL at 07:03

## 2025-03-18 RX ADMIN — IBUPROFEN 800 MG: 800 TABLET, FILM COATED ORAL at 06:03

## 2025-03-18 RX ADMIN — LEVETIRACETAM 750 MG: 100 SOLUTION ORAL at 07:03

## 2025-03-18 RX ADMIN — IBUPROFEN 800 MG: 800 TABLET, FILM COATED ORAL at 08:03

## 2025-03-18 RX ADMIN — IBUPROFEN 800 MG: 800 TABLET, FILM COATED ORAL at 02:03

## 2025-03-18 RX ADMIN — METFORMIN HYDROCHLORIDE 500 MG: 500 TABLET, FILM COATED ORAL at 06:03

## 2025-03-18 RX ADMIN — FERROUS SULFATE TAB 325 MG (65 MG ELEMENTAL FE) 1 EACH: 325 (65 FE) TAB at 07:03

## 2025-03-18 NOTE — PLAN OF CARE
Problem: Adult Inpatient Plan of Care  Goal: Plan of Care Review  Outcome: Met  Goal: Patient-Specific Goal (Individualized)  Outcome: Met  Goal: Absence of Hospital-Acquired Illness or Injury  Outcome: Met  Goal: Optimal Comfort and Wellbeing  Outcome: Met  Goal: Readiness for Transition of Care  Outcome: Met     Problem: Diabetes Comorbidity  Goal: Blood Glucose Level Within Targeted Range  Outcome: Met     Problem:  Fall Injury Risk  Goal: Absence of Fall, Infant Drop and Related Injury  Outcome: Met     Problem: Infection  Goal: Absence of Infection Signs and Symptoms  Outcome: Met     Problem:  Delivery  Goal: Bleeding is Controlled  Outcome: Met  Goal: Stable Fetal Wellbeing  Outcome: Met  Goal: Absence of Infection Signs and Symptoms  Outcome: Met  Goal: Effective Oxygenation and Ventilation  Outcome: Met     Problem: Wound  Goal: Optimal Coping  Outcome: Met  Goal: Optimal Functional Ability  Outcome: Met  Goal: Absence of Infection Signs and Symptoms  Outcome: Met  Goal: Improved Oral Intake  Outcome: Met  Goal: Optimal Pain Control and Function  Outcome: Met  Goal: Skin Health and Integrity  Outcome: Met  Goal: Optimal Wound Healing  Outcome: Met

## 2025-03-18 NOTE — DISCHARGE SUMMARY
"Delivery Discharge Summary  Obstetrics      Primary OB Clinician: Isma Payne MD    Discharge Provider: Abiodun Brown MD    Admission date: 3/13/2025  Discharge date: 2025    Admit Dx:   Discharge Dx:    Problem List[1]    Procedure:  section    Hospital Course:  Norma Denise is a 25 y.o. now  who was admitted on 3/13/2025 for delivery. Patient delivered a viable  via  section. Please see delivery note for further details. Pt was in stable condition post delivery and was transferred to the Mother-Baby Unit. Her postpartum course was uncomplicated. On the date of discharge, patient's pain is controlled with oral pain medications. She is tolerating ambulation without SOB or CP, and PO diet without N/V. Reported lochia is within the normal range. Pt in stable condition and ready for discharge.     Pertinent studies:  Postpartum CBC  Lab Results   Component Value Date    WBC 13.02 (H) 03/15/2025    HGB 7.4 (L) 03/15/2025    HCT 24.1 (L) 03/15/2025    MCV 71.7 (L) 03/15/2025     03/15/2025       Delivery:    Episiotomy:     Lacerations:     Repair suture:     Repair # of packets:     Blood loss (ml):       Birth information:  YOB: 2023   Time of birth: 12:36 PM   Sex: male   Delivery type: , Low Transverse   Gestational Age: 37w0d     Measurements    Weight: 4252 g  Weight (lbs): 9 lb 6 oz  Length: 53.3 cm  Length (in): 21"  Head circumference: 35.6 cm         Delivery Clinician: Delivery Providers    Delivering clinician: Isma Payne MD          Additional  information:  Forceps:    Vacuum:    Breech:    Observed anomalies      Living?:     Apgars    Living status: Living  Apgar Component Scores:  1 min.:  5 min.:  10 min.:  15 min.:  20 min.:    Skin color:  0  0       Heart rate:  2  2       Reflex irritability:  2  2       Muscle tone:  2  2       Respiratory effort:  2  2       Total:  8  8       Apgars assigned by: GERALDINE HAMILTON"       Placenta: Delivered:       appearance    Disposition: To home, self care    Follow Up: 2 weeks    Patient Instructions:   1. Call the office for any bleeding >2 pads/hour for >2 hours, temperature >100.4, pain that is uncontrolled with medications, or for any other concerns.  2. Pelvic rest and no tub baths x 6 weeks.  3. No driving while on narcotics.               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

## 2025-03-19 ENCOUNTER — ANESTHESIA (OUTPATIENT)
Dept: OBSTETRICS AND GYNECOLOGY | Facility: HOSPITAL | Age: 25
End: 2025-03-19
Payer: MEDICAID

## 2025-03-20 ENCOUNTER — PATIENT MESSAGE (OUTPATIENT)
Dept: OBSTETRICS AND GYNECOLOGY | Facility: HOSPITAL | Age: 25
End: 2025-03-20
Payer: MEDICAID

## 2025-03-21 ENCOUNTER — PATIENT MESSAGE (OUTPATIENT)
Dept: OBSTETRICS AND GYNECOLOGY | Facility: HOSPITAL | Age: 25
End: 2025-03-21
Payer: MEDICAID

## 2025-03-21 ENCOUNTER — LACTATION ENCOUNTER (OUTPATIENT)
Dept: INTENSIVE CARE | Facility: HOSPITAL | Age: 25
End: 2025-03-21

## 2025-03-21 NOTE — LACTATION NOTE
This note was copied from a baby's chart.  Spoke with Norma (mother) via phone regarding resumption of medications. She stated will be starting Latuda 40mg/day later today.   Notified MD/NNP; after reviewing Medications&Mothers Milk reference verbalized  ok to use. Notified RN

## 2025-03-24 PROBLEM — R03.0 ELEVATED BLOOD PRESSURE READING WITHOUT DIAGNOSIS OF HYPERTENSION: Status: RESOLVED | Noted: 2025-03-10 | Resolved: 2025-03-24

## 2025-03-24 PROBLEM — O16.9 ELEVATED BLOOD PRESSURE AFFECTING PREGNANCY, ANTEPARTUM: Status: RESOLVED | Noted: 2025-03-13 | Resolved: 2025-03-24

## 2025-03-24 PROBLEM — O09.90 AT HIGH RISK FOR COMPLICATIONS OF INTRAUTERINE PREGNANCY (IUP): Status: RESOLVED | Noted: 2023-11-01 | Resolved: 2025-03-24

## 2025-03-24 LAB — POCT GLUCOSE: 71 MG/DL (ref 70–110)

## (undated) DEVICE — CAP BABY BEANIE

## (undated) DEVICE — SUT CTD VICRYL 0 UND BR CT

## (undated) DEVICE — SOL WATER STRL IRR 1000ML

## (undated) DEVICE — SOL NACL IRR 1000ML BTL

## (undated) DEVICE — SEE MEDLINE ITEM 156931

## (undated) DEVICE — BULB SYRINGE EAR IRRIGATION

## (undated) DEVICE — Device

## (undated) DEVICE — STAPLER SKIN SUBCUTICULAR

## (undated) DEVICE — ELECTRODE REM PLYHSV RETURN 9

## (undated) DEVICE — PAD SANITARY OB STERILE

## (undated) DEVICE — ADHESIVE DERMABOND ADVANCED

## (undated) DEVICE — BINDER ABDOM 4PANEL 12IN LG/XL

## (undated) DEVICE — SET FLUID WARMER RANGER

## (undated) DEVICE — SUT MONOCRYL 4-0 PS-1 UND

## (undated) DEVICE — SUT 2/0 27IN PLAIN GUT CT

## (undated) DEVICE — TRAY CATH 1-LYR URIMTR 16FR

## (undated) DEVICE — SUT 2-0 VICRYL / CT-1

## (undated) DEVICE — PAD UNDERPAD 30X30

## (undated) DEVICE — DRESSING WND GZ 10X4X8X2IN

## (undated) DEVICE — SEE MEDLINE ITEM 157117